# Patient Record
Sex: MALE | Race: WHITE | NOT HISPANIC OR LATINO | Employment: OTHER | URBAN - METROPOLITAN AREA
[De-identification: names, ages, dates, MRNs, and addresses within clinical notes are randomized per-mention and may not be internally consistent; named-entity substitution may affect disease eponyms.]

---

## 2017-01-31 ENCOUNTER — AMBULATORY - HEALTHEAST (OUTPATIENT)
Dept: MULTI SPECIALTY CLINIC | Facility: CLINIC | Age: 62
End: 2017-01-31

## 2017-08-02 ENCOUNTER — RECORDS - HEALTHEAST (OUTPATIENT)
Dept: ADMINISTRATIVE | Facility: OTHER | Age: 62
End: 2017-08-02

## 2017-08-07 ENCOUNTER — ANESTHESIA - HEALTHEAST (OUTPATIENT)
Dept: SURGERY | Facility: CLINIC | Age: 62
End: 2017-08-07

## 2017-08-07 ENCOUNTER — SURGERY - HEALTHEAST (OUTPATIENT)
Dept: SURGERY | Facility: CLINIC | Age: 62
End: 2017-08-07

## 2017-08-07 ASSESSMENT — MIFFLIN-ST. JEOR: SCORE: 1966.17

## 2017-10-10 ENCOUNTER — HOSPITAL ENCOUNTER (OUTPATIENT)
Dept: ULTRASOUND IMAGING | Facility: HOSPITAL | Age: 62
Discharge: HOME | End: 2017-10-10
Attending: PHYSICIAN ASSISTANT

## 2017-10-10 ENCOUNTER — RECORDS - HEALTHEAST (OUTPATIENT)
Dept: ADMINISTRATIVE | Facility: OTHER | Age: 62
End: 2017-10-10

## 2017-10-10 DIAGNOSIS — M79.89 PAIN AND SWELLING OF LEFT LOWER EXTREMITY: ICD-10-CM

## 2017-10-10 DIAGNOSIS — M79.605 PAIN AND SWELLING OF LEFT LOWER EXTREMITY: ICD-10-CM

## 2017-12-04 ENCOUNTER — ANESTHESIA - HEALTHEAST (OUTPATIENT)
Dept: SURGERY | Facility: CLINIC | Age: 62
End: 2017-12-04

## 2017-12-05 ENCOUNTER — SURGERY - HEALTHEAST (OUTPATIENT)
Dept: SURGERY | Facility: CLINIC | Age: 62
End: 2017-12-05

## 2017-12-05 ASSESSMENT — MIFFLIN-ST. JEOR: SCORE: 1908.34

## 2019-06-25 ENCOUNTER — OFFICE VISIT - HEALTHEAST (OUTPATIENT)
Dept: INTERNAL MEDICINE | Facility: CLINIC | Age: 64
End: 2019-06-25

## 2019-06-25 DIAGNOSIS — E11.9 TYPE 2 DIABETES MELLITUS WITHOUT COMPLICATION, WITHOUT LONG-TERM CURRENT USE OF INSULIN (H): ICD-10-CM

## 2019-06-25 DIAGNOSIS — I48.20 CHRONIC ATRIAL FIBRILLATION (H): ICD-10-CM

## 2019-06-25 DIAGNOSIS — I25.10 CORONARY ARTERY DISEASE DUE TO LIPID RICH PLAQUE: ICD-10-CM

## 2019-06-25 DIAGNOSIS — I25.83 CORONARY ARTERY DISEASE DUE TO LIPID RICH PLAQUE: ICD-10-CM

## 2019-06-25 DIAGNOSIS — E78.2 MIXED HYPERLIPIDEMIA: ICD-10-CM

## 2019-06-25 DIAGNOSIS — I87.2 VENOUS STASIS DERMATITIS OF RIGHT LOWER EXTREMITY: ICD-10-CM

## 2019-06-25 DIAGNOSIS — I10 ESSENTIAL HYPERTENSION: ICD-10-CM

## 2019-06-25 DIAGNOSIS — I50.42 CHRONIC COMBINED SYSTOLIC AND DIASTOLIC CONGESTIVE HEART FAILURE (H): ICD-10-CM

## 2019-06-25 DIAGNOSIS — F33.42 RECURRENT MAJOR DEPRESSIVE DISORDER, IN FULL REMISSION (H): ICD-10-CM

## 2019-06-25 LAB
ALBUMIN SERPL-MCNC: 3.5 G/DL (ref 3.5–5)
ALP SERPL-CCNC: 95 U/L (ref 45–120)
ALT SERPL W P-5'-P-CCNC: 20 U/L (ref 0–45)
ANION GAP SERPL CALCULATED.3IONS-SCNC: 13 MMOL/L (ref 5–18)
AST SERPL W P-5'-P-CCNC: 17 U/L (ref 0–40)
BILIRUB DIRECT SERPL-MCNC: 0.3 MG/DL
BILIRUB SERPL-MCNC: 0.7 MG/DL (ref 0–1)
BNP SERPL-MCNC: 25 PG/ML (ref 0–59)
BUN SERPL-MCNC: 25 MG/DL (ref 8–22)
CALCIUM SERPL-MCNC: 9.4 MG/DL (ref 8.5–10.5)
CHLORIDE BLD-SCNC: 95 MMOL/L (ref 98–107)
CHOLEST SERPL-MCNC: 105 MG/DL
CO2 SERPL-SCNC: 24 MMOL/L (ref 22–31)
CREAT SERPL-MCNC: 1.51 MG/DL (ref 0.7–1.3)
DIGOXIN LEVEL LHE- HISTORICAL: 0.7 NG/ML (ref 0.5–2)
ERYTHROCYTE [DISTWIDTH] IN BLOOD BY AUTOMATED COUNT: 12.4 % (ref 11–14.5)
FASTING STATUS PATIENT QL REPORTED: YES
GFR SERPL CREATININE-BSD FRML MDRD: 47 ML/MIN/1.73M2
GLUCOSE BLD-MCNC: 203 MG/DL (ref 70–125)
HBA1C MFR BLD: 8.8 % (ref 3.5–6)
HCT VFR BLD AUTO: 41.6 % (ref 40–54)
HDLC SERPL-MCNC: 38 MG/DL
HGB BLD-MCNC: 14.4 G/DL (ref 14–18)
LDLC SERPL CALC-MCNC: 45 MG/DL
MCH RBC QN AUTO: 31.3 PG (ref 27–34)
MCHC RBC AUTO-ENTMCNC: 34.6 G/DL (ref 32–36)
MCV RBC AUTO: 91 FL (ref 80–100)
PLATELET # BLD AUTO: 244 THOU/UL (ref 140–440)
PMV BLD AUTO: 6.8 FL (ref 7–10)
POTASSIUM BLD-SCNC: 4.4 MMOL/L (ref 3.5–5)
PROT SERPL-MCNC: 6.7 G/DL (ref 6–8)
RBC # BLD AUTO: 4.59 MILL/UL (ref 4.4–6.2)
SODIUM SERPL-SCNC: 132 MMOL/L (ref 136–145)
TRIGL SERPL-MCNC: 108 MG/DL
TSH SERPL DL<=0.005 MIU/L-ACNC: 0.52 UIU/ML (ref 0.3–5)
WBC: 15.4 THOU/UL (ref 4–11)

## 2019-06-25 ASSESSMENT — MIFFLIN-ST. JEOR: SCORE: 2079

## 2019-06-26 ENCOUNTER — COMMUNICATION - HEALTHEAST (OUTPATIENT)
Dept: INTERNAL MEDICINE | Facility: CLINIC | Age: 64
End: 2019-06-26

## 2019-06-26 DIAGNOSIS — R94.4 ABNORMAL RENAL FUNCTION TEST: ICD-10-CM

## 2019-07-03 ENCOUNTER — OFFICE VISIT - HEALTHEAST (OUTPATIENT)
Dept: CARDIOLOGY | Facility: CLINIC | Age: 64
End: 2019-07-03

## 2019-07-03 ENCOUNTER — COMMUNICATION - HEALTHEAST (OUTPATIENT)
Dept: INTERNAL MEDICINE | Facility: CLINIC | Age: 64
End: 2019-07-03

## 2019-07-03 DIAGNOSIS — I48.20 CHRONIC ATRIAL FIBRILLATION (H): ICD-10-CM

## 2019-07-03 LAB — D DIMER PPP FEU-MCNC: <0.27 FEU UG/ML

## 2019-07-03 ASSESSMENT — MIFFLIN-ST. JEOR: SCORE: 2083.54

## 2019-07-09 ENCOUNTER — OFFICE VISIT - HEALTHEAST (OUTPATIENT)
Dept: INTERNAL MEDICINE | Facility: CLINIC | Age: 64
End: 2019-07-09

## 2019-07-09 DIAGNOSIS — E11.9 TYPE 2 DIABETES MELLITUS WITHOUT COMPLICATION, WITHOUT LONG-TERM CURRENT USE OF INSULIN (H): ICD-10-CM

## 2019-07-09 DIAGNOSIS — E78.2 MIXED HYPERLIPIDEMIA: ICD-10-CM

## 2019-07-09 DIAGNOSIS — I10 ESSENTIAL HYPERTENSION: ICD-10-CM

## 2019-07-09 DIAGNOSIS — I50.42 CHRONIC COMBINED SYSTOLIC AND DIASTOLIC CONGESTIVE HEART FAILURE (H): ICD-10-CM

## 2019-07-09 DIAGNOSIS — N18.30 CKD (CHRONIC KIDNEY DISEASE) STAGE 3, GFR 30-59 ML/MIN (H): ICD-10-CM

## 2019-07-09 DIAGNOSIS — I89.0 LYMPHEDEMA OF BOTH LOWER EXTREMITIES: ICD-10-CM

## 2019-07-09 DIAGNOSIS — I87.2 VENOUS STASIS DERMATITIS OF RIGHT LOWER EXTREMITY: ICD-10-CM

## 2019-07-09 DIAGNOSIS — I48.20 CHRONIC ATRIAL FIBRILLATION (H): ICD-10-CM

## 2019-07-09 ASSESSMENT — MIFFLIN-ST. JEOR: SCORE: 2079

## 2019-07-12 ENCOUNTER — COMMUNICATION - HEALTHEAST (OUTPATIENT)
Dept: INTERNAL MEDICINE | Facility: CLINIC | Age: 64
End: 2019-07-12

## 2019-07-12 DIAGNOSIS — R60.9 EDEMA, UNSPECIFIED TYPE: ICD-10-CM

## 2019-07-15 ENCOUNTER — COMMUNICATION - HEALTHEAST (OUTPATIENT)
Dept: INTERNAL MEDICINE | Facility: CLINIC | Age: 64
End: 2019-07-15

## 2019-07-15 DIAGNOSIS — E11.9 TYPE 2 DIABETES MELLITUS WITHOUT COMPLICATION, WITHOUT LONG-TERM CURRENT USE OF INSULIN (H): ICD-10-CM

## 2019-07-26 ENCOUNTER — RECORDS - HEALTHEAST (OUTPATIENT)
Dept: ADMINISTRATIVE | Facility: OTHER | Age: 64
End: 2019-07-26

## 2019-08-06 ENCOUNTER — OFFICE VISIT - HEALTHEAST (OUTPATIENT)
Dept: INTERNAL MEDICINE | Facility: CLINIC | Age: 64
End: 2019-08-06

## 2019-08-06 DIAGNOSIS — E11.9 TYPE 2 DIABETES MELLITUS WITHOUT COMPLICATION, WITHOUT LONG-TERM CURRENT USE OF INSULIN (H): ICD-10-CM

## 2019-08-06 DIAGNOSIS — I25.10 CORONARY ARTERY DISEASE DUE TO LIPID RICH PLAQUE: ICD-10-CM

## 2019-08-06 DIAGNOSIS — I25.83 CORONARY ARTERY DISEASE DUE TO LIPID RICH PLAQUE: ICD-10-CM

## 2019-08-06 DIAGNOSIS — I87.2 VENOUS STASIS DERMATITIS OF RIGHT LOWER EXTREMITY: ICD-10-CM

## 2019-08-06 DIAGNOSIS — I50.42 CHRONIC COMBINED SYSTOLIC AND DIASTOLIC CONGESTIVE HEART FAILURE (H): ICD-10-CM

## 2019-08-06 DIAGNOSIS — E78.2 MIXED HYPERLIPIDEMIA: ICD-10-CM

## 2019-08-06 DIAGNOSIS — I10 ESSENTIAL HYPERTENSION: ICD-10-CM

## 2019-08-06 DIAGNOSIS — I48.20 CHRONIC ATRIAL FIBRILLATION (H): ICD-10-CM

## 2019-08-06 DIAGNOSIS — D72.829 LEUKOCYTOSIS, UNSPECIFIED TYPE: ICD-10-CM

## 2019-08-06 DIAGNOSIS — G89.29 CHRONIC PAIN OF LEFT KNEE: ICD-10-CM

## 2019-08-06 DIAGNOSIS — M25.562 CHRONIC PAIN OF LEFT KNEE: ICD-10-CM

## 2019-08-06 LAB
BASOPHILS # BLD AUTO: 0.1 THOU/UL (ref 0–0.2)
BASOPHILS NFR BLD AUTO: 1 % (ref 0–2)
EOSINOPHIL # BLD AUTO: 0.2 THOU/UL (ref 0–0.4)
EOSINOPHIL NFR BLD AUTO: 2 % (ref 0–6)
ERYTHROCYTE [DISTWIDTH] IN BLOOD BY AUTOMATED COUNT: 12.6 % (ref 11–14.5)
HCT VFR BLD AUTO: 38.6 % (ref 40–54)
HGB BLD-MCNC: 13.1 G/DL (ref 14–18)
LYMPHOCYTES # BLD AUTO: 3.1 THOU/UL (ref 0.8–4.4)
LYMPHOCYTES NFR BLD AUTO: 34 % (ref 20–40)
MCH RBC QN AUTO: 31.1 PG (ref 27–34)
MCHC RBC AUTO-ENTMCNC: 34 G/DL (ref 32–36)
MCV RBC AUTO: 92 FL (ref 80–100)
MONOCYTES # BLD AUTO: 0.9 THOU/UL (ref 0–0.9)
MONOCYTES NFR BLD AUTO: 9 % (ref 2–10)
NEUTROPHILS # BLD AUTO: 5 THOU/UL (ref 2–7.7)
NEUTROPHILS NFR BLD AUTO: 54 % (ref 50–70)
PLATELET # BLD AUTO: 236 THOU/UL (ref 140–440)
PMV BLD AUTO: 6.8 FL (ref 7–10)
RBC # BLD AUTO: 4.21 MILL/UL (ref 4.4–6.2)
WBC: 9.2 THOU/UL (ref 4–11)

## 2019-08-06 ASSESSMENT — MIFFLIN-ST. JEOR: SCORE: 2088.08

## 2019-08-07 ENCOUNTER — COMMUNICATION - HEALTHEAST (OUTPATIENT)
Dept: INTERNAL MEDICINE | Facility: CLINIC | Age: 64
End: 2019-08-07

## 2019-08-07 LAB
BASOPHILS # BLD AUTO: 0 THOU/UL (ref 0–0.2)
BASOPHILS NFR BLD AUTO: 0 % (ref 0–2)
EOSINOPHIL # BLD AUTO: 0.1 THOU/UL (ref 0–0.4)
EOSINOPHIL NFR BLD AUTO: 2 % (ref 0–6)
ERYTHROCYTE [DISTWIDTH] IN BLOOD BY AUTOMATED COUNT: 13.2 % (ref 11–14.5)
HCT VFR BLD AUTO: 39.4 % (ref 40–54)
HGB BLD-MCNC: 13 G/DL (ref 14–18)
LYMPHOCYTES # BLD AUTO: 2.9 THOU/UL (ref 0.8–4.4)
LYMPHOCYTES NFR BLD AUTO: 33 % (ref 20–40)
MCH RBC QN AUTO: 30.8 PG (ref 27–34)
MCHC RBC AUTO-ENTMCNC: 33 G/DL (ref 32–36)
MCV RBC AUTO: 93 FL (ref 80–100)
MONOCYTES # BLD AUTO: 0.8 THOU/UL (ref 0–0.9)
MONOCYTES NFR BLD AUTO: 9 % (ref 2–10)
NEUTROPHILS # BLD AUTO: 5 THOU/UL (ref 2–7.7)
NEUTROPHILS NFR BLD AUTO: 56 % (ref 50–70)
PATH REPORT.MICROSCOPIC SPEC OTHER STN: ABNORMAL
PLAT MORPH BLD: NORMAL
PLATELET # BLD AUTO: 225 THOU/UL (ref 140–440)
PMV BLD AUTO: 9.4 FL (ref 8.5–12.5)
RBC # BLD AUTO: 4.22 MILL/UL (ref 4.4–6.2)
REACTIVE LYMPHS: ABNORMAL
WBC: 8.9 THOU/UL (ref 4–11)

## 2019-08-08 LAB
LAB AP CHARGES (HE HISTORICAL CONVERSION): NORMAL
PATH REPORT.COMMENTS IMP SPEC: NORMAL
PATH REPORT.COMMENTS IMP SPEC: NORMAL
PATH REPORT.FINAL DX SPEC: NORMAL
PATH REPORT.RELEVANT HX SPEC: NORMAL

## 2019-08-09 ENCOUNTER — COMMUNICATION - HEALTHEAST (OUTPATIENT)
Dept: INTERNAL MEDICINE | Facility: CLINIC | Age: 64
End: 2019-08-09

## 2019-08-09 DIAGNOSIS — G89.29 CHRONIC PAIN OF LEFT KNEE: ICD-10-CM

## 2019-08-09 DIAGNOSIS — M25.562 CHRONIC PAIN OF LEFT KNEE: ICD-10-CM

## 2019-08-09 DIAGNOSIS — M62.838 MUSCLE SPASM: ICD-10-CM

## 2019-08-12 ENCOUNTER — OFFICE VISIT - HEALTHEAST (OUTPATIENT)
Dept: VASCULAR SURGERY | Facility: CLINIC | Age: 64
End: 2019-08-12

## 2019-08-12 DIAGNOSIS — I87.2 VENOUS INSUFFICIENCY (CHRONIC) (PERIPHERAL): ICD-10-CM

## 2019-08-12 DIAGNOSIS — I89.0 LYMPHEDEMA OF BOTH LOWER EXTREMITIES: ICD-10-CM

## 2019-08-12 DIAGNOSIS — I73.9 PAD (PERIPHERAL ARTERY DISEASE) (H): ICD-10-CM

## 2019-08-14 ENCOUNTER — OFFICE VISIT - HEALTHEAST (OUTPATIENT)
Dept: PHARMACY | Facility: CLINIC | Age: 64
End: 2019-08-14

## 2019-08-14 ENCOUNTER — COMMUNICATION - HEALTHEAST (OUTPATIENT)
Dept: PHARMACY | Facility: CLINIC | Age: 64
End: 2019-08-14

## 2019-08-14 ENCOUNTER — COMMUNICATION - HEALTHEAST (OUTPATIENT)
Dept: ADMINISTRATIVE | Facility: HOSPITAL | Age: 64
End: 2019-08-14

## 2019-08-14 DIAGNOSIS — M25.562 CHRONIC PAIN OF LEFT KNEE: ICD-10-CM

## 2019-08-14 DIAGNOSIS — E11.9 TYPE 2 DIABETES MELLITUS WITHOUT COMPLICATION, WITHOUT LONG-TERM CURRENT USE OF INSULIN (H): ICD-10-CM

## 2019-08-14 DIAGNOSIS — M62.838 MUSCLE SPASM: ICD-10-CM

## 2019-08-14 DIAGNOSIS — G89.29 CHRONIC PAIN OF LEFT KNEE: ICD-10-CM

## 2019-08-15 ENCOUNTER — COMMUNICATION - HEALTHEAST (OUTPATIENT)
Dept: ONCOLOGY | Facility: HOSPITAL | Age: 64
End: 2019-08-15

## 2019-08-15 ENCOUNTER — COMMUNICATION - HEALTHEAST (OUTPATIENT)
Dept: ADMINISTRATIVE | Facility: HOSPITAL | Age: 64
End: 2019-08-15

## 2019-08-28 ENCOUNTER — OFFICE VISIT - HEALTHEAST (OUTPATIENT)
Dept: INTERNAL MEDICINE | Facility: CLINIC | Age: 64
End: 2019-08-28

## 2019-08-28 DIAGNOSIS — M25.551 HIP PAIN, RIGHT: ICD-10-CM

## 2019-08-28 DIAGNOSIS — M25.561 CHRONIC PAIN OF RIGHT KNEE: ICD-10-CM

## 2019-08-28 DIAGNOSIS — I48.20 CHRONIC ATRIAL FIBRILLATION (H): ICD-10-CM

## 2019-08-28 DIAGNOSIS — G89.29 CHRONIC PAIN OF RIGHT KNEE: ICD-10-CM

## 2019-08-28 ASSESSMENT — MIFFLIN-ST. JEOR: SCORE: 2038.18

## 2019-09-04 ENCOUNTER — COMMUNICATION - HEALTHEAST (OUTPATIENT)
Dept: INTERNAL MEDICINE | Facility: CLINIC | Age: 64
End: 2019-09-04

## 2019-09-04 ENCOUNTER — OFFICE VISIT - HEALTHEAST (OUTPATIENT)
Dept: PHARMACY | Facility: CLINIC | Age: 64
End: 2019-09-04

## 2019-09-04 DIAGNOSIS — E11.9 TYPE 2 DIABETES MELLITUS WITHOUT COMPLICATION, WITHOUT LONG-TERM CURRENT USE OF INSULIN (H): ICD-10-CM

## 2019-09-05 ENCOUNTER — RECORDS - HEALTHEAST (OUTPATIENT)
Dept: ADMINISTRATIVE | Facility: OTHER | Age: 64
End: 2019-09-05

## 2019-09-09 ENCOUNTER — OFFICE VISIT - HEALTHEAST (OUTPATIENT)
Dept: ONCOLOGY | Facility: HOSPITAL | Age: 64
End: 2019-09-09

## 2019-09-09 ENCOUNTER — OFFICE VISIT - HEALTHEAST (OUTPATIENT)
Dept: EDUCATION SERVICES | Facility: CLINIC | Age: 64
End: 2019-09-09

## 2019-09-09 DIAGNOSIS — E11.9 TYPE 2 DIABETES MELLITUS WITHOUT COMPLICATION, WITHOUT LONG-TERM CURRENT USE OF INSULIN (H): ICD-10-CM

## 2019-09-09 DIAGNOSIS — D72.829 LEUKOCYTOSIS, UNSPECIFIED TYPE: ICD-10-CM

## 2019-09-09 ASSESSMENT — MIFFLIN-ST. JEOR: SCORE: 2045.89

## 2019-09-19 ENCOUNTER — COMMUNICATION - HEALTHEAST (OUTPATIENT)
Dept: INTERNAL MEDICINE | Facility: CLINIC | Age: 64
End: 2019-09-19

## 2019-09-19 DIAGNOSIS — R60.9 EDEMA, UNSPECIFIED TYPE: ICD-10-CM

## 2019-09-27 ENCOUNTER — COMMUNICATION - HEALTHEAST (OUTPATIENT)
Dept: ADMINISTRATIVE | Facility: CLINIC | Age: 64
End: 2019-09-27

## 2019-09-27 ENCOUNTER — COMMUNICATION - HEALTHEAST (OUTPATIENT)
Dept: INTERNAL MEDICINE | Facility: CLINIC | Age: 64
End: 2019-09-27

## 2019-09-27 ENCOUNTER — OFFICE VISIT - HEALTHEAST (OUTPATIENT)
Dept: EDUCATION SERVICES | Facility: CLINIC | Age: 64
End: 2019-09-27

## 2019-09-27 DIAGNOSIS — G89.29 CHRONIC PAIN OF RIGHT KNEE: ICD-10-CM

## 2019-09-27 DIAGNOSIS — M25.551 HIP PAIN, RIGHT: ICD-10-CM

## 2019-09-27 DIAGNOSIS — E11.9 TYPE 2 DIABETES MELLITUS WITHOUT COMPLICATION, WITHOUT LONG-TERM CURRENT USE OF INSULIN (H): ICD-10-CM

## 2019-09-27 DIAGNOSIS — M25.561 CHRONIC PAIN OF RIGHT KNEE: ICD-10-CM

## 2019-10-01 ENCOUNTER — COMMUNICATION - HEALTHEAST (OUTPATIENT)
Dept: EDUCATION SERVICES | Facility: CLINIC | Age: 64
End: 2019-10-01

## 2019-10-01 DIAGNOSIS — E11.9 TYPE 2 DIABETES MELLITUS WITHOUT COMPLICATION, WITHOUT LONG-TERM CURRENT USE OF INSULIN (H): ICD-10-CM

## 2019-10-02 ENCOUNTER — RECORDS - HEALTHEAST (OUTPATIENT)
Dept: VASCULAR ULTRASOUND | Facility: CLINIC | Age: 64
End: 2019-10-02

## 2019-10-02 DIAGNOSIS — I73.9 PERIPHERAL VASCULAR DISEASE, UNSPECIFIED (H): ICD-10-CM

## 2019-10-02 DIAGNOSIS — I87.2 VENOUS INSUFFICIENCY (CHRONIC) (PERIPHERAL): ICD-10-CM

## 2019-10-02 DIAGNOSIS — I89.0 LYMPHEDEMA, NOT ELSEWHERE CLASSIFIED: ICD-10-CM

## 2019-10-03 ENCOUNTER — HEALTH MAINTENANCE LETTER (OUTPATIENT)
Age: 64
End: 2019-10-03

## 2019-10-04 ENCOUNTER — COMMUNICATION - HEALTHEAST (OUTPATIENT)
Dept: INTERNAL MEDICINE | Facility: CLINIC | Age: 64
End: 2019-10-04

## 2019-10-04 DIAGNOSIS — M62.838 MUSCLE SPASM: ICD-10-CM

## 2019-10-14 ENCOUNTER — COMMUNICATION - HEALTHEAST (OUTPATIENT)
Dept: VASCULAR SURGERY | Facility: CLINIC | Age: 64
End: 2019-10-14

## 2019-10-29 ENCOUNTER — COMMUNICATION - HEALTHEAST (OUTPATIENT)
Dept: INTERNAL MEDICINE | Facility: CLINIC | Age: 64
End: 2019-10-29

## 2019-10-29 DIAGNOSIS — M62.838 MUSCLE SPASM: ICD-10-CM

## 2019-11-12 ENCOUNTER — COMMUNICATION - HEALTHEAST (OUTPATIENT)
Dept: INTERNAL MEDICINE | Facility: CLINIC | Age: 64
End: 2019-11-12

## 2019-11-15 ENCOUNTER — OFFICE VISIT - HEALTHEAST (OUTPATIENT)
Dept: EDUCATION SERVICES | Facility: CLINIC | Age: 64
End: 2019-11-15

## 2019-11-15 DIAGNOSIS — E11.9 TYPE 2 DIABETES MELLITUS WITHOUT COMPLICATION, WITHOUT LONG-TERM CURRENT USE OF INSULIN (H): ICD-10-CM

## 2019-11-19 ENCOUNTER — COMMUNICATION - HEALTHEAST (OUTPATIENT)
Dept: INTERNAL MEDICINE | Facility: CLINIC | Age: 64
End: 2019-11-19

## 2019-11-19 DIAGNOSIS — M62.838 MUSCLE SPASM: ICD-10-CM

## 2019-11-20 ENCOUNTER — COMMUNICATION - HEALTHEAST (OUTPATIENT)
Dept: CARDIOLOGY | Facility: CLINIC | Age: 64
End: 2019-11-20

## 2019-11-21 ENCOUNTER — COMMUNICATION - HEALTHEAST (OUTPATIENT)
Dept: EDUCATION SERVICES | Facility: CLINIC | Age: 64
End: 2019-11-21

## 2019-11-21 DIAGNOSIS — E11.9 TYPE 2 DIABETES MELLITUS WITHOUT COMPLICATION, WITHOUT LONG-TERM CURRENT USE OF INSULIN (H): ICD-10-CM

## 2019-12-13 ENCOUNTER — OFFICE VISIT - HEALTHEAST (OUTPATIENT)
Dept: EDUCATION SERVICES | Facility: CLINIC | Age: 64
End: 2019-12-13

## 2019-12-13 DIAGNOSIS — E11.9 TYPE 2 DIABETES MELLITUS WITHOUT COMPLICATION, WITHOUT LONG-TERM CURRENT USE OF INSULIN (H): ICD-10-CM

## 2019-12-24 ENCOUNTER — COMMUNICATION - HEALTHEAST (OUTPATIENT)
Dept: ANTICOAGULATION | Facility: CLINIC | Age: 64
End: 2019-12-24

## 2019-12-24 ENCOUNTER — COMMUNICATION - HEALTHEAST (OUTPATIENT)
Dept: INTERNAL MEDICINE | Facility: CLINIC | Age: 64
End: 2019-12-24

## 2019-12-24 ENCOUNTER — OFFICE VISIT - HEALTHEAST (OUTPATIENT)
Dept: INTERNAL MEDICINE | Facility: CLINIC | Age: 64
End: 2019-12-24

## 2019-12-24 DIAGNOSIS — I50.42 CHRONIC COMBINED SYSTOLIC AND DIASTOLIC CONGESTIVE HEART FAILURE (H): ICD-10-CM

## 2019-12-24 DIAGNOSIS — Z79.4 TYPE 2 DIABETES MELLITUS WITHOUT COMPLICATION, WITH LONG-TERM CURRENT USE OF INSULIN (H): ICD-10-CM

## 2019-12-24 DIAGNOSIS — E11.9 TYPE 2 DIABETES MELLITUS WITHOUT COMPLICATION, WITH LONG-TERM CURRENT USE OF INSULIN (H): ICD-10-CM

## 2019-12-24 DIAGNOSIS — I48.20 CHRONIC ATRIAL FIBRILLATION (H): ICD-10-CM

## 2019-12-24 DIAGNOSIS — I25.83 CORONARY ARTERY DISEASE DUE TO LIPID RICH PLAQUE: ICD-10-CM

## 2019-12-24 DIAGNOSIS — Z23 NEED FOR IMMUNIZATION AGAINST INFLUENZA: ICD-10-CM

## 2019-12-24 DIAGNOSIS — R53.82 CHRONIC FATIGUE: ICD-10-CM

## 2019-12-24 DIAGNOSIS — I25.10 CORONARY ARTERY DISEASE DUE TO LIPID RICH PLAQUE: ICD-10-CM

## 2019-12-24 DIAGNOSIS — I48.91 A-FIB (H): ICD-10-CM

## 2019-12-24 DIAGNOSIS — M62.838 MUSCLE SPASM: ICD-10-CM

## 2019-12-24 DIAGNOSIS — I10 ESSENTIAL HYPERTENSION: ICD-10-CM

## 2019-12-24 DIAGNOSIS — E78.2 MIXED HYPERLIPIDEMIA: ICD-10-CM

## 2019-12-24 DIAGNOSIS — Z79.01 LONG TERM (CURRENT) USE OF ANTICOAGULANTS: ICD-10-CM

## 2019-12-24 DIAGNOSIS — F33.42 RECURRENT MAJOR DEPRESSIVE DISORDER, IN FULL REMISSION (H): ICD-10-CM

## 2019-12-24 LAB
ALBUMIN SERPL-MCNC: 3.6 G/DL (ref 3.5–5)
ALP SERPL-CCNC: 111 U/L (ref 45–120)
ALT SERPL W P-5'-P-CCNC: 19 U/L (ref 0–45)
ANION GAP SERPL CALCULATED.3IONS-SCNC: 14 MMOL/L (ref 5–18)
AST SERPL W P-5'-P-CCNC: 13 U/L (ref 0–40)
BILIRUB DIRECT SERPL-MCNC: 0.2 MG/DL
BILIRUB SERPL-MCNC: 0.4 MG/DL (ref 0–1)
BUN SERPL-MCNC: 11 MG/DL (ref 8–22)
CALCIUM SERPL-MCNC: 9.7 MG/DL (ref 8.5–10.5)
CHLORIDE BLD-SCNC: 104 MMOL/L (ref 98–107)
CHOLEST SERPL-MCNC: 108 MG/DL
CO2 SERPL-SCNC: 20 MMOL/L (ref 22–31)
CREAT SERPL-MCNC: 0.83 MG/DL (ref 0.7–1.3)
ERYTHROCYTE [DISTWIDTH] IN BLOOD BY AUTOMATED COUNT: 13.2 % (ref 11–14.5)
FASTING STATUS PATIENT QL REPORTED: ABNORMAL
GFR SERPL CREATININE-BSD FRML MDRD: >60 ML/MIN/1.73M2
GLUCOSE BLD-MCNC: 150 MG/DL (ref 70–125)
HBA1C MFR BLD: 9.6 % (ref 3.5–6)
HCT VFR BLD AUTO: 43.2 % (ref 40–54)
HDLC SERPL-MCNC: 33 MG/DL
HGB BLD-MCNC: 14.1 G/DL (ref 14–18)
INR PPP: 1.01 (ref 0.9–1.1)
LDLC SERPL CALC-MCNC: 51 MG/DL
MCH RBC QN AUTO: 29.6 PG (ref 27–34)
MCHC RBC AUTO-ENTMCNC: 32.6 G/DL (ref 32–36)
MCV RBC AUTO: 91 FL (ref 80–100)
PLATELET # BLD AUTO: 259 THOU/UL (ref 140–440)
PMV BLD AUTO: 9 FL (ref 8.5–12.5)
POTASSIUM BLD-SCNC: 3.5 MMOL/L (ref 3.5–5)
PROT SERPL-MCNC: 7.2 G/DL (ref 6–8)
RBC # BLD AUTO: 4.76 MILL/UL (ref 4.4–6.2)
SODIUM SERPL-SCNC: 138 MMOL/L (ref 136–145)
TRIGL SERPL-MCNC: 120 MG/DL
TSH SERPL DL<=0.005 MIU/L-ACNC: 1.93 UIU/ML (ref 0.3–5)
WBC: 13.2 THOU/UL (ref 4–11)

## 2019-12-24 ASSESSMENT — MIFFLIN-ST. JEOR: SCORE: 2083.54

## 2019-12-24 ASSESSMENT — PATIENT HEALTH QUESTIONNAIRE - PHQ9: SUM OF ALL RESPONSES TO PHQ QUESTIONS 1-9: 9

## 2019-12-27 LAB
SHBG SERPL-SCNC: 44 NMOL/L (ref 11–80)
TESTOST FREE SERPL-MCNC: 3.54 NG/DL (ref 4.7–24.4)
TESTOST SERPL-MCNC: 222 NG/DL (ref 240–950)

## 2019-12-30 ENCOUNTER — COMMUNICATION - HEALTHEAST (OUTPATIENT)
Dept: INTERNAL MEDICINE | Facility: CLINIC | Age: 64
End: 2019-12-30

## 2019-12-30 DIAGNOSIS — E11.9 TYPE 2 DIABETES MELLITUS WITHOUT COMPLICATION, WITH LONG-TERM CURRENT USE OF INSULIN (H): ICD-10-CM

## 2019-12-30 DIAGNOSIS — E29.1 HYPOGONADISM MALE: ICD-10-CM

## 2019-12-30 DIAGNOSIS — Z79.4 TYPE 2 DIABETES MELLITUS WITHOUT COMPLICATION, WITH LONG-TERM CURRENT USE OF INSULIN (H): ICD-10-CM

## 2019-12-31 ENCOUNTER — COMMUNICATION - HEALTHEAST (OUTPATIENT)
Dept: INTERNAL MEDICINE | Facility: CLINIC | Age: 64
End: 2019-12-31

## 2020-01-03 ENCOUNTER — COMMUNICATION - HEALTHEAST (OUTPATIENT)
Dept: ANTICOAGULATION | Facility: CLINIC | Age: 65
End: 2020-01-03

## 2020-01-06 ENCOUNTER — COMMUNICATION - HEALTHEAST (OUTPATIENT)
Dept: ANTICOAGULATION | Facility: CLINIC | Age: 65
End: 2020-01-06

## 2020-01-06 ENCOUNTER — HOSPITAL ENCOUNTER (OUTPATIENT)
Dept: PHARMACY | Facility: OTHER | Age: 65
Discharge: HOME | End: 2020-01-06
Attending: PHARMACIST

## 2020-01-06 ENCOUNTER — AMBULATORY - HEALTHEAST (OUTPATIENT)
Dept: LAB | Facility: CLINIC | Age: 65
End: 2020-01-06

## 2020-01-06 DIAGNOSIS — I50.42 CHRONIC COMBINED SYSTOLIC AND DIASTOLIC CONGESTIVE HEART FAILURE (H): ICD-10-CM

## 2020-01-06 DIAGNOSIS — E11.9 TYPE 2 DIABETES MELLITUS WITHOUT COMPLICATION, WITHOUT LONG-TERM CURRENT USE OF INSULIN (H): ICD-10-CM

## 2020-01-06 DIAGNOSIS — I25.83 CORONARY ARTERY DISEASE DUE TO LIPID RICH PLAQUE: ICD-10-CM

## 2020-01-06 DIAGNOSIS — F33.42 RECURRENT MAJOR DEPRESSIVE DISORDER, IN FULL REMISSION (H): ICD-10-CM

## 2020-01-06 DIAGNOSIS — I48.20 CHRONIC ATRIAL FIBRILLATION (H): ICD-10-CM

## 2020-01-06 DIAGNOSIS — I25.10 CORONARY ARTERY DISEASE DUE TO LIPID RICH PLAQUE: ICD-10-CM

## 2020-01-06 DIAGNOSIS — I48.91 A-FIB (H): ICD-10-CM

## 2020-01-06 DIAGNOSIS — R79.89 LOW TESTOSTERONE IN MALE: ICD-10-CM

## 2020-01-06 LAB — INR PPP: 2.3 (ref 0.9–1.1)

## 2020-01-09 ENCOUNTER — COMMUNICATION - HEALTHEAST (OUTPATIENT)
Dept: INTERNAL MEDICINE | Facility: CLINIC | Age: 65
End: 2020-01-09

## 2020-01-09 DIAGNOSIS — E78.2 MIXED HYPERLIPIDEMIA: ICD-10-CM

## 2020-01-15 ENCOUNTER — COMMUNICATION - HEALTHEAST (OUTPATIENT)
Dept: ANTICOAGULATION | Facility: CLINIC | Age: 65
End: 2020-01-15

## 2020-01-15 ENCOUNTER — AMBULATORY - HEALTHEAST (OUTPATIENT)
Dept: LAB | Facility: CLINIC | Age: 65
End: 2020-01-15

## 2020-01-15 DIAGNOSIS — I48.91 A-FIB (H): ICD-10-CM

## 2020-01-15 DIAGNOSIS — I48.20 CHRONIC ATRIAL FIBRILLATION (H): ICD-10-CM

## 2020-01-15 LAB — INR PPP: 2.2 (ref 0.9–1.1)

## 2020-01-21 ENCOUNTER — COMMUNICATION - HEALTHEAST (OUTPATIENT)
Dept: INTERNAL MEDICINE | Facility: CLINIC | Age: 65
End: 2020-01-21

## 2020-01-21 DIAGNOSIS — I10 ESSENTIAL HYPERTENSION: ICD-10-CM

## 2020-01-27 ENCOUNTER — HOSPITAL ENCOUNTER (OUTPATIENT)
Dept: PHARMACY | Facility: OTHER | Age: 65
Discharge: HOME | End: 2020-01-27
Attending: PHARMACIST

## 2020-01-27 DIAGNOSIS — E11.9 TYPE 2 DIABETES MELLITUS WITHOUT COMPLICATION, WITHOUT LONG-TERM CURRENT USE OF INSULIN (H): ICD-10-CM

## 2020-01-27 DIAGNOSIS — I50.42 CHRONIC COMBINED SYSTOLIC AND DIASTOLIC CONGESTIVE HEART FAILURE (H): ICD-10-CM

## 2020-01-27 DIAGNOSIS — R79.89 LOW TESTOSTERONE IN MALE: ICD-10-CM

## 2020-02-01 ENCOUNTER — COMMUNICATION - HEALTHEAST (OUTPATIENT)
Dept: INTERNAL MEDICINE | Facility: CLINIC | Age: 65
End: 2020-02-01

## 2020-02-01 DIAGNOSIS — E11.9 TYPE 2 DIABETES MELLITUS WITHOUT COMPLICATION, WITHOUT LONG-TERM CURRENT USE OF INSULIN (H): ICD-10-CM

## 2020-02-06 LAB — INR PPP: 2.1 (ref 0.9–1.1)

## 2020-02-07 ENCOUNTER — COMMUNICATION - HEALTHEAST (OUTPATIENT)
Dept: INTERNAL MEDICINE | Facility: CLINIC | Age: 65
End: 2020-02-07

## 2020-02-08 ENCOUNTER — HEALTH MAINTENANCE LETTER (OUTPATIENT)
Age: 65
End: 2020-02-08

## 2020-02-11 ENCOUNTER — COMMUNICATION - HEALTHEAST (OUTPATIENT)
Dept: INTERNAL MEDICINE | Facility: CLINIC | Age: 65
End: 2020-02-11

## 2020-02-11 DIAGNOSIS — M62.838 MUSCLE SPASM: ICD-10-CM

## 2020-02-14 ENCOUNTER — AMBULATORY - HEALTHEAST (OUTPATIENT)
Dept: PHARMACY | Facility: CLINIC | Age: 65
End: 2020-02-14

## 2020-02-14 DIAGNOSIS — I48.20 CHRONIC ATRIAL FIBRILLATION (H): ICD-10-CM

## 2020-02-14 DIAGNOSIS — I25.83 CORONARY ARTERY DISEASE DUE TO LIPID RICH PLAQUE: ICD-10-CM

## 2020-02-14 DIAGNOSIS — E11.9 TYPE 2 DIABETES MELLITUS WITHOUT COMPLICATION, WITHOUT LONG-TERM CURRENT USE OF INSULIN (H): ICD-10-CM

## 2020-02-14 DIAGNOSIS — R79.89 LOW TESTOSTERONE IN MALE: ICD-10-CM

## 2020-02-14 DIAGNOSIS — I50.42 CHRONIC COMBINED SYSTOLIC AND DIASTOLIC CONGESTIVE HEART FAILURE (H): ICD-10-CM

## 2020-02-14 DIAGNOSIS — I25.10 CORONARY ARTERY DISEASE DUE TO LIPID RICH PLAQUE: ICD-10-CM

## 2020-02-18 ENCOUNTER — COMMUNICATION - HEALTHEAST (OUTPATIENT)
Dept: ANTICOAGULATION | Facility: CLINIC | Age: 65
End: 2020-02-18

## 2020-02-18 DIAGNOSIS — I48.20 CHRONIC ATRIAL FIBRILLATION (H): ICD-10-CM

## 2020-03-09 ENCOUNTER — COMMUNICATION - HEALTHEAST (OUTPATIENT)
Dept: INTERNAL MEDICINE | Facility: CLINIC | Age: 65
End: 2020-03-09

## 2020-03-09 DIAGNOSIS — Z79.4 TYPE 2 DIABETES MELLITUS WITHOUT COMPLICATION, WITH LONG-TERM CURRENT USE OF INSULIN (H): ICD-10-CM

## 2020-03-09 DIAGNOSIS — E11.9 TYPE 2 DIABETES MELLITUS WITHOUT COMPLICATION, WITH LONG-TERM CURRENT USE OF INSULIN (H): ICD-10-CM

## 2020-03-24 ENCOUNTER — COMMUNICATION - HEALTHEAST (OUTPATIENT)
Dept: ANTICOAGULATION | Facility: CLINIC | Age: 65
End: 2020-03-24

## 2020-03-24 ENCOUNTER — COMMUNICATION - HEALTHEAST (OUTPATIENT)
Dept: INTERNAL MEDICINE | Facility: CLINIC | Age: 65
End: 2020-03-24

## 2020-03-24 DIAGNOSIS — M62.838 MUSCLE SPASM: ICD-10-CM

## 2020-03-24 DIAGNOSIS — I48.20 CHRONIC ATRIAL FIBRILLATION (H): ICD-10-CM

## 2020-03-31 ENCOUNTER — VIRTUAL VISIT (OUTPATIENT)
Dept: URGENT CARE | Facility: CLINIC | Age: 65
End: 2020-03-31
Payer: COMMERCIAL

## 2020-03-31 DIAGNOSIS — R05.9 COUGH: Primary | ICD-10-CM

## 2020-03-31 DIAGNOSIS — R04.2 COUGHING UP BLOOD: ICD-10-CM

## 2020-03-31 PROCEDURE — 99207 ZZC NO BILLABLE SERVICE THIS VISIT: CPT | Performed by: STUDENT IN AN ORGANIZED HEALTH CARE EDUCATION/TRAINING PROGRAM

## 2020-03-31 NOTE — PATIENT INSTRUCTIONS
Based on your symptoms (coughing up blood &  dizziness and lightheadedness that gets worse with position changes), I recommend that you physically be evaluated by a provider. This can be at your PCP clinic or at a local Urgent Care. The number to call to schedule an Urgent Care visit is [phone].     Additionally, your symptoms could be due to COVID-19, but it also could be due to a number of other respiratory illnesses.  We are not doing testing at this time for COVID-19 unless symptoms are severe enough to require hospitalization.  It is recommended that you stay home to prevent the spread of your illness.  To do this follow these instructions:    Regardless of if you have been tested or not:  Patient who have symptoms (cough, fever, or shortness of breath), need to isolate for 7 days from when symptoms started AND 72 hours after fever resolves (without fever reducing medications) AND improvement of respiratory symptoms (whichever is longer).      Isolate yourself at home (in own room/own bathroom if possible)    Do Not allow any visitors    Do Not go to work or school    Do Not go to [employer],  centers, shopping, or other public places.    Do Not shake hands.    Avoid close and intimate contact with others (hugging, kissing).    Follow CDC recommendations for household cleaning of frequently touched services.     After the initial 7 days, continue to isolate yourself from household members as much as possible. To continue decrease the risk of community spread and exposure, you and any members of your household should limit activities in public for 14 days after starting home isolation.     You can reference the following CDC link for helpful home isolation/care tips:  https://www.cdc.gov/coronavirus/2019-ncov/downloads/10Things.pdf    Protect Others:    Cover your mouth and nose with a mask, disposable tissue or wash cloth to avoid spreading germs to others.    Wash your hands and face frequently with  soap and water.    Fever Medicines:    For fever relief, take acetaminophen or ibuprofen.    Treat fevers above 101  F (38.3  C) to lower fevers and make you more comfortable.     Acetaminophen (e.g., Tylenol): Take 650 mg (two 325 mg pills) by mouth every 4-6 hours as needed of regular strength Tylenol or 1,000 mg (two 500 mg pills) every 8 hours as needed of Extra Strength Tylenol.     Ibuprofen (e.g., Motrin, Advil): Take 400 mg (two 200 mg pills) by mouth every 6 hours as needed.     Acetaminophen is thought to be safer than ibuprofen or naproxen for people over 65 years old. Acetaminophen is in many OTC and prescription medicines. It might be in more than one medicine that you are taking. You need to be careful and not take an overdose. Before taking any medicine, read all the instructions on the package.    Caution - NSAIDs (e.g., ibuprofen, naproxen): Do not take nonsteroidal anti-inflammatory drugs (NSAIDs) if you have stomach problems, kidney disease, heart failure, or other contraindications to using this type of medicine. Do not take NSAID medicines for over 7 days without consulting your PCP. Do not take NSAID medicines if you are pregnant. Do not take NSAID medicines if you are also taking blood thinners.     Call Back If: Breathing difficulty develops or you become worse.    Thank you for limiting contact with others, wearing a simple mask to cover your cough, practice good hand hygiene habits and accessing our virtual services where possible to limit the spread of this virus.    For more information about COVID19 and options for caring for yourself at home, please visit the CDC website at https://www.cdc.gov/coronavirus/2019-ncov/about/steps-when-sick.html  For more options for care at [facility], please visit our website at [email or web address]     Instructions for Patients:     Based on your symptoms (coughing up blood &  dizziness and lightheadedness that gets  worse with position changes), I recommend that you physically be evaluated by a provider. This can be at your PCP clinic or at a local Urgent Care. The number to call to schedule an Urgent Care visit is [phone].     Additionally, your symptoms could be due to COVID-19, but it also could be due to a number of other respiratory illnesses.  We are not doing testing at this time for COVID-19 unless symptoms are severe enough to require hospitalization.  It is recommended that you stay home to prevent the spread of your illness.  To do this follow these instructions:    Regardless of if you have been tested or not:  Patient who have symptoms (cough, fever, or shortness of breath), need to isolate for 7 days from when symptoms started AND 72 hours after fever resolves (without fever reducing medications) AND improvement of respiratory symptoms (whichever is longer).      Isolate yourself at home (in own room/own bathroom if possible)    Do Not allow any visitors    Do Not go to work or school    Do Not go to [employer],  centers, shopping, or other public places.    Do Not shake hands.    Avoid close and intimate contact with others (hugging, kissing).    Follow CDC recommendations for household cleaning of frequently touched services.     After the initial 7 days, continue to isolate yourself from household members as much as possible. To continue decrease the risk of community spread and exposure, you and any members of your household should limit activities in public for 14 days after starting home isolation.     You can reference the following CDC link for helpful home isolation/care tips:  https://www.cdc.gov/coronavirus/2019-ncov/downloads/10Things.pdf    Protect Others:    Cover your mouth and nose with a mask, disposable tissue or wash cloth to avoid spreading germs to others.    Wash your hands and face frequently with soap and water.    Fever Medicines:    For fever relief, take acetaminophen or  ibuprofen.    Treat fevers above 101  F (38.3  C) to lower fevers and make you more comfortable.     Acetaminophen (e.g., Tylenol): Take 650 mg (two 325 mg pills) by mouth every 4-6 hours as needed of regular strength Tylenol or 1,000 mg (two 500 mg pills) every 8 hours as needed of Extra Strength Tylenol.     Ibuprofen (e.g., Motrin, Advil): Take 400 mg (two 200 mg pills) by mouth every 6 hours as needed.     Acetaminophen is thought to be safer than ibuprofen or naproxen for people over 65 years old. Acetaminophen is in many OTC and prescription medicines. It might be in more than one medicine that you are taking. You need to be careful and not take an overdose. Before taking any medicine, read all the instructions on the package.    Caution - NSAIDs (e.g., ibuprofen, naproxen): Do not take nonsteroidal anti-inflammatory drugs (NSAIDs) if you have stomach problems, kidney disease, heart failure, or other contraindications to using this type of medicine. Do not take NSAID medicines for over 7 days without consulting your PCP. Do not take NSAID medicines if you are pregnant. Do not take NSAID medicines if you are also taking blood thinners.     Call Back If: Breathing difficulty develops or you become worse.    Thank you for limiting contact with others, wearing a simple mask to cover your cough, practice good hand hygiene habits and accessing our virtual services where possible to limit the spread of this virus.    For more information about COVID19 and options for caring for yourself at home, please visit the CDC website at https://www.cdc.gov/coronavirus/2019-ncov/about/steps-when-sick.html  For more options for care at [facility], please visit our website at [email or web address]

## 2020-03-31 NOTE — LETTER
March 31, 2020      Obi Messer  [address]  [place] [address]        To Whom It May Concern:    Obi Messer was evaluated by [employer] on 3/31/2020.   Please excuse Obi Messer from work WITH restrictions until 4/6/2020.     *Restrictions prior to returning to work: Must be 72 hours without a fever and significant improvement in cough or shortness of breath. This may be after the date listed above.      Sincerely,  Leta Arteaga, DO

## 2020-03-31 NOTE — PROGRESS NOTES
I reviewed the telephone visit encounter and discussed the patient with the resident and agree with the resident s assessment and plan of care as documented.    Roderick Bailey MD MPH, Faculty, Family Medicine

## 2020-03-31 NOTE — PROGRESS NOTES
"The patient has been notified of following:     \"This telephone visit will be conducted via a call between you and your physician/provider. We have found that certain health care needs can be provided without the need for a physical exam.  This service lets us provide the care you need with a short phone conversation.  If a prescription is necessary we can send it directly to your pharmacy.  If lab work is needed we can place an order for that and you can then stop by our lab to have the test done at a later time.    If during the course of the call the physician/provider feels a telephone visit is not appropriate, you will not be charged for this service.\"     Subjective     CC: Obi Messer  is a 64 year old male with PMH of ischemic cardiomyopathy with resultant HFrEF, CAD, AFib, and HTN who presents to clinic today for the following health issues:   Chief Complaint   Patient presents with     Suspected Covid      The patient states that he's had a few days of flu-like symptoms. Last week, he developed a dry cough that then turned into a productive cough. He also had episodes of hemoptysis on Sunday and Monday. States he was coughing up quite a bit of blood but could not quantify. He has also been having headache, night sweats, myalgias, lightheadedness, orthostatic symptoms, and chest discomfort. He has been unable to check his temperature but feels feverish. He has been taking Tylenol for his symptoms.     Upon further evaluation of chest discomfort, the patient states his chest pain is located in the center of his chest, and it is sharp in nature. It is worse with inhalation. His chest pain is provoked by lying down.     Denies: Sore throat, nasal congestion.     Of note, the patient states that his blood pressure has also been low lately (90/60). His blood glucose has also been low (blood glucose 75). He has not had his INR checked in over a month.     Works as a telephone customer . "     History:  In the 14 days before your symptoms started, have you had close contact with a COVID-19 (Coronavirus) patient? No    In the 14 days before your symptoms started, have you traveled internationally or to a state with high rates of COVID19? No    Do you have a fever? I do not know    Are you having difficulty breathing? No, but does have shortness of breath    Do you have a cough? Yes      Is your coughing worse when you are exposed to pollen, dust, or other things in the environment? No      Are you coughing up any mucus? I am coughing up a lot of mucus.     What is the appearance of the mucus? The mucus is brown or reddish in color    Are you experiencing any of the following? None of these    What other symptoms have you experienced? Headache and Body aches    Do you have any of the following? Sweating at night, Loss of appetite and Fatigue    Have you ever been diagnosed with asthma, bronchitis, or lung disease? No    Do you smoke? No     Have you ever smoked? I have never smoked       Are there people you know with similar symptoms? No, does live in [facility]     Have you recently been hospitalized? No    Are you pregnant or breastfeeding?: No      Patient Active Problem List   Diagnosis     Anxiety state     Essential hypertension     Depressive disorder, not elsewhere classified     Atrial fibrillation (H)     CAD (coronary artery disease)     NSTEMI (non-ST elevated myocardial infarction) 2/22/13     Ischemic cardiomyopathy w/EF 25%     Chronic anticoagulation for a-fib; INR goal 2.0-2.5     Past Surgical History:   Procedure Laterality Date     BACK SURGERY       C NONSPECIFIC PROCEDURE      tonsillectomy     C NONSPECIFIC PROCEDURE      cholecystectomy     C NONSPECIFIC PROCEDURE  2004    C-spine fusion     CHOLECYSTECTOMY       ENT SURGERY         Social History     Tobacco Use     Smoking status: Current Some Day Smoker     Years: 40.00     Types: Cigars     Tobacco comment: 3-5 cigars  a week   Substance Use Topics     Alcohol use: Yes     Comment: Up to 2 drink weekly;  used to drink more     Family History   Problem Relation Age of Onset     C.A.D. Father          at age 63; had suffered mult heart attacks     Gastrointestinal Disease Mother          at age 68; some sort of liver problem         Current Outpatient Medications   Medication Sig Dispense Refill     aspirin EC 81 MG EC tablet Take 1 tablet by mouth daily. 100 tablet 5     atorvastatin (LIPITOR) 20 MG tablet Take 1 tablet by mouth daily. 90 tablet 3     Cholecalciferol (VITAMIN D3 PO) Take 1,000 Units by mouth daily.       clopidogrel (PLAVIX) 75 MG tablet Take 1 tablet by mouth daily. 90 tablet 10     Furosemide (LASIX) 20 MG tablet Take 2 tablets by mouth daily. 180 tablet 3     IBUPROFEN PO Take 800 mg by mouth as needed. Takes 4-200mg tabs as needed       lisinopril (PRINIVIL,ZESTRIL) 5 MG tablet Take 1 tablet by mouth 2 times daily. 90 tablet 5     LORazepam (ATIVAN) 1 MG tablet Take 1 tablet by mouth every 8 hours as needed. 20 tablet 0     metoprolol (TOPROL-XL) 50 MG 24 hr tablet Take 1.5 tablets (75 mg) by mouth daily 135 tablet 3     pantoprazole (PROTONIX) 40 MG enteric coated tablet Take 1 tablet by mouth every morning (before breakfast). 90 tablet 5     QUEtiapine Fumarate (SEROQUEL PO) Take 50 mg by mouth 2 times daily. But patient reports only taking once daily.  Says he forgets second dose       spironolactone (ALDACTONE) 25 MG tablet Take 0.5 tablets by mouth daily. 45 tablet 3     venlafaxine (EFFEXOR-ER) 150 MG TB24 Take 150 mg by mouth daily. Patient reports taking one daily but directions on bottle are twice daily (confirmed with pharmacy)       warfarin (COUMADIN) 5 MG tablet Take 1.5 tabs (7.5 mg) M,W,F & 1 tab (5 mg) all other days once/day 30 tablet      ZOLPIDEM TARTRATE PO Take 10 mg by mouth nightly as needed. Found dose with pharmacy       Allergies   Allergen Reactions     No Known Drug Allergies              Review of Systems   ROS COMP: Constitutional, HEENT, cardiovascular, pulmonary, gi and gu systems are negative, except as otherwise noted.      Objective   Gen: Patient is alert, oriented  Resp: The patient is speaking in full sentences on room air     Diagnostic Test Results:  Labs reviewed in Epic  Hgb - 14.1 (2/6/2020)   INR - 2.1 (2/6/2020)       Assessment/Plan:  Mr. Messer is a 65yo male with PMH of ischemic cardiomyopathy, HFrEF, CAD, AFib on warfarin, and HTN who presents with symptoms consistent with viral URI and hemoptysis.     The patient likely has a viral URI, however I cannot rule out COVID-19. I am mostly concerned about the patient's hemoptysis with concurrent hypotension and orthostatic symptoms. He has not had his INR checked in over 1 month. Based on these symptoms, I recommended that the patient be evaluated urgently, either at PCP clinic or Urgent Care.     I also recommended that he self-quarantine for a minimum of 7 days based on symptoms. A work letter was provided for the patient, which is available to him through My Chart.      Phone call duration:  25 minutes    This patient was discussed with my attending, Dr. Bailey.     Leta Arteaga, DO  PGY2 Internal Medicine

## 2020-04-01 ENCOUNTER — OFFICE VISIT (OUTPATIENT)
Dept: URGENT CARE | Facility: URGENT CARE | Age: 65
End: 2020-04-01
Payer: COMMERCIAL

## 2020-04-01 ENCOUNTER — ANCILLARY PROCEDURE (OUTPATIENT)
Dept: GENERAL RADIOLOGY | Facility: CLINIC | Age: 65
End: 2020-04-01
Attending: INTERNAL MEDICINE
Payer: COMMERCIAL

## 2020-04-01 VITALS
OXYGEN SATURATION: 96 % | DIASTOLIC BLOOD PRESSURE: 80 MMHG | SYSTOLIC BLOOD PRESSURE: 114 MMHG | TEMPERATURE: 98.1 F | HEART RATE: 80 BPM

## 2020-04-01 DIAGNOSIS — J22 LRTI (LOWER RESPIRATORY TRACT INFECTION): ICD-10-CM

## 2020-04-01 DIAGNOSIS — Z20.822 SUSPECTED COVID-19 VIRUS INFECTION: Primary | ICD-10-CM

## 2020-04-01 DIAGNOSIS — Z79.01 LONG TERM CURRENT USE OF ANTICOAGULANT THERAPY: ICD-10-CM

## 2020-04-01 DIAGNOSIS — R05.8 PRODUCTIVE COUGH: ICD-10-CM

## 2020-04-01 DIAGNOSIS — R06.02 SOB (SHORTNESS OF BREATH): ICD-10-CM

## 2020-04-01 DIAGNOSIS — R04.2 HEMOPTYSIS: ICD-10-CM

## 2020-04-01 LAB
BASOPHILS # BLD AUTO: 0 10E9/L (ref 0–0.2)
BASOPHILS NFR BLD AUTO: 0.1 %
DIFFERENTIAL METHOD BLD: NORMAL
EOSINOPHIL # BLD AUTO: 0.2 10E9/L (ref 0–0.7)
EOSINOPHIL NFR BLD AUTO: 2.1 %
ERYTHROCYTE [DISTWIDTH] IN BLOOD BY AUTOMATED COUNT: 14.9 % (ref 10–15)
HCT VFR BLD AUTO: 42.9 % (ref 40–53)
HGB BLD-MCNC: 14.1 G/DL (ref 13.3–17.7)
INR PPP: 3 (ref 0.86–1.14)
INR PPP: 3 (ref 0.9–1.1)
LYMPHOCYTES # BLD AUTO: 2.3 10E9/L (ref 0.8–5.3)
LYMPHOCYTES NFR BLD AUTO: 25.7 %
MCH RBC QN AUTO: 29.6 PG (ref 26.5–33)
MCHC RBC AUTO-ENTMCNC: 32.9 G/DL (ref 31.5–36.5)
MCV RBC AUTO: 90 FL (ref 78–100)
MONOCYTES # BLD AUTO: 0.7 10E9/L (ref 0–1.3)
MONOCYTES NFR BLD AUTO: 7.6 %
NEUTROPHILS # BLD AUTO: 5.7 10E9/L (ref 1.6–8.3)
NEUTROPHILS NFR BLD AUTO: 64.5 %
PLATELET # BLD AUTO: 230 10E9/L (ref 150–450)
RBC # BLD AUTO: 4.76 10E12/L (ref 4.4–5.9)
WBC # BLD AUTO: 8.9 10E9/L (ref 4–11)

## 2020-04-01 PROCEDURE — 99205 OFFICE O/P NEW HI 60 MIN: CPT | Performed by: INTERNAL MEDICINE

## 2020-04-01 PROCEDURE — 80048 BASIC METABOLIC PNL TOTAL CA: CPT | Performed by: INTERNAL MEDICINE

## 2020-04-01 PROCEDURE — 85025 COMPLETE CBC W/AUTO DIFF WBC: CPT | Performed by: INTERNAL MEDICINE

## 2020-04-01 PROCEDURE — 71046 X-RAY EXAM CHEST 2 VIEWS: CPT

## 2020-04-01 PROCEDURE — 36415 COLL VENOUS BLD VENIPUNCTURE: CPT | Performed by: INTERNAL MEDICINE

## 2020-04-01 PROCEDURE — 85610 PROTHROMBIN TIME: CPT | Performed by: INTERNAL MEDICINE

## 2020-04-01 RX ORDER — CEFDINIR 300 MG/1
300 CAPSULE ORAL 2 TIMES DAILY
Qty: 20 CAPSULE | Refills: 0 | Status: SHIPPED | OUTPATIENT
Start: 2020-04-01 | End: 2020-04-11

## 2020-04-01 ASSESSMENT — ENCOUNTER SYMPTOMS
COUGH: 1
LIGHT-HEADEDNESS: 1
SHORTNESS OF BREATH: 1
SORE THROAT: 0
MYALGIAS: 1
CHILLS: 1
FEVER: 1
APPETITE CHANGE: 1
FATIGUE: 1
EYE DISCHARGE: 0
ADENOPATHY: 0
DIAPHORESIS: 1
CONFUSION: 1
SLEEP DISTURBANCE: 1
RHINORRHEA: 1
HEADACHES: 1

## 2020-04-01 NOTE — PROGRESS NOTES
SUBJECTIVE:   Obi Messer is a 64 year old male presenting with a chief complaint of   Chief Complaint   Patient presents with     Suspected Covid     Due to being covid suspect wore  PPE    He is a new patient of [facility].      Suspect Covid - Adult    Onset of symptoms was 1 week(s) ago.  Course of illness is worsening.      Current and Associated symptoms:  Feeling sick  Started feverish, chills, sweats, cough  body aches and fatigue    Dry cough initially which turned into productive cough over the weekend  Bloody cough X 3 days  Still coughing up blood today.  Lightheaded  Dizzy  shortness of breath,     Treatment measures tried include Tylenol  For fever feeling - last dose this morning  .  Predisposing factors include ill contact:   Denies exposure to covid.  Denies international travel or travel to state with covid rate.    tobacco use  - quit 2013    Last INR 2.1 February 6, 2020    Hx borderline elevated white count followed by specialist    Review of Systems   Constitutional: Positive for appetite change, chills, diaphoresis, fatigue and fever.   HENT: Positive for rhinorrhea. Negative for congestion and sore throat.    Eyes: Negative for discharge.   Respiratory: Positive for cough and shortness of breath.    Cardiovascular: Positive for chest pain (Sharp chest pain in center of chest.  Worse with breathing and lying flat.).        Took few lasix this week due to swelling ankles & fingers   Gastrointestinal:        Nausea & diarrhea could be side effects metformin   Endocrine:        Blood sugar 75 at home   Genitourinary: Negative for decreased urine volume.   Musculoskeletal: Positive for myalgias.   Skin: Negative for rash.   Allergic/Immunologic: Negative for environmental allergies.   Neurological: Positive for light-headedness (Blood pressure 90/60 at home) and headaches.   Hematological: Negative for adenopathy.   Psychiatric/Behavioral: Positive for confusion (feels like vocabulary is not as  good) and sleep disturbance (due to cough, knee pain).       Past Medical History:   Diagnosis Date     anxiety disorder     Managed by psychiatrist     CAD (coronary artery disease) 2013     Depressive disorder, not elsewhere classified     Managed by psychiatrist     Ischemic cardiomyopathy 3/3/2013     NSTEMI (non-ST elevated myocardial infarction) (H) 3/3/2013     Unspecified essential hypertension      Family History   Problem Relation Age of Onset     C.A.D. Father          at age 63; had suffered mult heart attacks     Gastrointestinal Disease Mother          at age 68; some sort of liver problem     Current Outpatient Medications   Medication Sig Dispense Refill     aspirin EC 81 MG EC tablet Take 1 tablet by mouth daily. 100 tablet 5     atorvastatin (LIPITOR) 20 MG tablet Take 1 tablet by mouth daily. 90 tablet 3     Cholecalciferol (VITAMIN D3 PO) Take 1,000 Units by mouth daily.       clopidogrel (PLAVIX) 75 MG tablet Take 1 tablet by mouth daily. 90 tablet 10     Furosemide (LASIX) 20 MG tablet Take 2 tablets by mouth daily. 180 tablet 3     IBUPROFEN PO Take 800 mg by mouth as needed. Takes 4-200mg tabs as needed       lisinopril (PRINIVIL,ZESTRIL) 5 MG tablet Take 1 tablet by mouth 2 times daily. 90 tablet 5     LORazepam (ATIVAN) 1 MG tablet Take 1 tablet by mouth every 8 hours as needed. 20 tablet 0     metoprolol (TOPROL-XL) 50 MG 24 hr tablet Take 1.5 tablets (75 mg) by mouth daily 135 tablet 3     pantoprazole (PROTONIX) 40 MG enteric coated tablet Take 1 tablet by mouth every morning (before breakfast). 90 tablet 5     QUEtiapine Fumarate (SEROQUEL PO) Take 50 mg by mouth 2 times daily. But patient reports only taking once daily.  Says he forgets second dose       spironolactone (ALDACTONE) 25 MG tablet Take 0.5 tablets by mouth daily. 45 tablet 3     venlafaxine (EFFEXOR-ER) 150 MG TB24 Take 150 mg by mouth daily. Patient reports taking one daily but directions on bottle are twice  daily (confirmed with pharmacy)       warfarin (COUMADIN) 5 MG tablet Take 1.5 tabs (7.5 mg) M,W,F & 1 tab (5 mg) all other days once/day 30 tablet      ZOLPIDEM TARTRATE PO Take 10 mg by mouth nightly as needed. Found dose with pharmacy       Social History     Tobacco Use     Smoking status: Former Smoker     Years: 40.00     Types: Cigars     Last attempt to quit: 2013     Years since quittin.0     Tobacco comment: 3-5 cigars a week   Substance Use Topics     Alcohol use: Yes     Comment: Up to 2 drink weekly;  used to drink more       OBJECTIVE  /80   Pulse 80   Temp 98.1  F (36.7  C)   SpO2 96%     Physical Exam  Vitals signs reviewed.   Constitutional:       Appearance: Normal appearance. He is ill-appearing. He is not toxic-appearing or diaphoretic.   HENT:      Right Ear: Tympanic membrane normal.      Left Ear: Tympanic membrane normal.      Nose: Nose normal.      Mouth/Throat:      Mouth: Mucous membranes are moist.      Pharynx: Oropharynx is clear.   Eyes:      Conjunctiva/sclera: Conjunctivae normal.   Cardiovascular:      Rate and Rhythm: Normal rate. Rhythm irregular.      Pulses: Normal pulses.      Heart sounds: Normal heart sounds.   Pulmonary:      Effort: Pulmonary effort is normal.      Breath sounds: Normal breath sounds.      Comments: Deep cough  Chest:      Chest wall: Tenderness (With sternal palpation) present.   Abdominal:      Palpations: Abdomen is soft.      Tenderness: There is no abdominal tenderness.   Musculoskeletal:      Right lower leg: No edema.      Left lower leg: No edema.   Skin:     Findings: No rash.   Neurological:      General: No focal deficit present.      Mental Status: He is alert.   Psychiatric:         Mood and Affect: Mood normal.         Labs:  Results for orders placed or performed in visit on 20 (from the past 24 hour(s))   INR   Result Value Ref Range    INR 3.00 (H) 0.86 - 1.14   CBC with platelets and differential   Result Value Ref  Range    WBC 8.9 4.0 - 11.0 10e9/L    RBC Count 4.76 4.4 - 5.9 10e12/L    Hemoglobin 14.1 13.3 - 17.7 g/dL    Hematocrit 42.9 40.0 - 53.0 %    MCV 90 78 - 100 fl    MCH 29.6 26.5 - 33.0 pg    MCHC 32.9 31.5 - 36.5 g/dL    RDW 14.9 10.0 - 15.0 %    Platelet Count 230 150 - 450 10e9/L    Diff Method Automated Method     % Neutrophils 64.5 %    % Lymphocytes 25.7 %    % Monocytes 7.6 %    % Eosinophils 2.1 %    % Basophils 0.1 %    Absolute Neutrophil 5.7 1.6 - 8.3 10e9/L    Absolute Lymphocytes 2.3 0.8 - 5.3 10e9/L    Absolute Monocytes 0.7 0.0 - 1.3 10e9/L    Absolute Eosinophils 0.2 0.0 - 0.7 10e9/L    Absolute Basophils 0.0 0.0 - 0.2 10e9/L       X-Ray was done, my findings are: Compared to previous x-ray.  No obvious infiltrate or patchiness from hemoptysis noted    ASSESSMENT:      ICD-10-CM    1. Suspected Covid-19 Virus Infection  R68.89 INR     CBC with platelets and differential     XR Chest 2 Views     Basic metabolic panel   2. Long term current use of anticoagulant therapy  Z79.01 INR     CBC with platelets and differential     XR Chest 2 Views     Basic metabolic panel   3. Hemoptysis  R04.2 INR     CBC with platelets and differential     XR Chest 2 Views     Basic metabolic panel   4. Productive cough  R05 INR     CBC with platelets and differential     XR Chest 2 Views     Basic metabolic panel   5. SOB (shortness of breath)  R06.02 INR     CBC with platelets and differential     XR Chest 2 Views     Basic metabolic panel   6. LRTI (lower respiratory tract infection)  J22       Discussed all below with patient in detail on the phone.  Medical Decision Making:    Concern for respiratory symptoms with hemoptysis.  In addition Covid, also worry about elevated INR causing hemoptysis in the lungs, community-acquired pneumonia.    Covid suspect.  Concern for hemoptysis.    Chest x-ray did not show any patchy infiltrate to suggest bleeding into the lungs or pneumonia.  Could alone be due to concurrent illness  with elevated INR of 3.  Instructed to hold Coumadin and follow-up with INR clinic tomorrow.  Placed on antibiotic as cough productive and complicated by hemoptysis.    Will require close follow-up due to concern for potential  Covid    Patient would be considered high risk for Covid complications    As his white count was normal, he appeared comfortable and had normal vital signs with normal lung exam I felt it was appropriate for close follow-up.  I did not think he needed to go to the emergency room.    I requested patient to follow-up by phone in 1 to 2 days and go to the ER if symptoms of trouble breathing or call 911      Patient took a few doses of Lasix this week.  Discussed with patient he should be careful with taking Lasix as that may have made his blood pressure lower and causes dizziness.    Metabolic panel checked and pending and should be back tomorrow evening      Serious Comorbid Conditions:  Coronary artery disease, atrial fibrillation, ischemic cardiomyopathy, hypertension, Adult:  CHF and Anticoagulation    Metabolic panel pending and patient will receive results per my chart          Patient Instructions               Regardless of if you have been tested or not:  Patient who have symptoms (cough, fever, or shortness of breath), need to isolate for 7 days from when symptoms started AND 72 hours after fever resolves (without fever reducing medications) AND improvement of respiratory symptoms (whichever is longer).      Isolate yourself at home (in own room/own bathroom if possible)    Do Not allow any visitors    Do Not go to work or school    Do Not go to [place],  centers, shopping, or other public places.    Do Not shake hands.    Avoid close and intimate contact with others (hugging, kissing).    Follow CDC recommendations for household cleaning of frequently touched services.     After the initial 7 days, continue to isolate yourself from household members as much as possible. To  continue decrease the risk of community spread and exposure, you and any members of your household should limit activities in public for 14 days after starting home isolation.     You can reference the following CDC link for helpful home isolation/care tips:  https://www.cdc.gov/coronavirus/2019-ncov/downloads/10Things.pdf    Protect Others:    Cover Your Mouth and Nose with a mask, disposable tissue or wash cloth to avoid spreading germs to others.    Wash your hands and face frequently with soap and water    Call Back If: Breathing difficulty develops or you become worse.    For more information about COVID19 and options for caring for yourself at home, please visit the CDC website at https://www.cdc.gov/coronavirus/2019-ncov/about/steps-when-sick.html  For more options for care at [facility], please visit our website at [email or web address]

## 2020-04-01 NOTE — LETTER
[facility]  [address] [place] [address]  Phone: [phone]    April 1, 2020        Obi Messer  [address]   [place] [address]          To whom it may concern:    RE: Obi Singhflorida    Patient was seen and treated today at our clinic and missed work.  Potentially may miss up to 2 weeks of work with close follow up with primary clinic.    Please contact me for questions or concerns.      Sincerely,        [facility] Provider

## 2020-04-01 NOTE — PATIENT INSTRUCTIONS
Take omnicef 2 x day for 10 days    Contact INR clinic tomorrow for adjustment    Followup:  Phone call follow-up in 1 to 2 days.  To ER if increased trouble breathing.          Regardless of if you have been tested or not:  Patient who have symptoms (cough, fever, or shortness of breath), need to isolate for 7 days from when symptoms started AND 72 hours after fever resolves (without fever reducing medications) AND improvement of respiratory symptoms (whichever is longer).      Isolate yourself at home (in own room/own bathroom if possible)    Do Not allow any visitors    Do Not go to work or school    Do Not go to [employer],  centers, shopping, or other public places.    Do Not shake hands.    Avoid close and intimate contact with others (hugging, kissing).    Follow CDC recommendations for household cleaning of frequently touched services.     After the initial 7 days, continue to isolate yourself from household members as much as possible. To continue decrease the risk of community spread and exposure, you and any members of your household should limit activities in public for 14 days after starting home isolation.     You can reference the following CDC link for helpful home isolation/care tips:  https://www.cdc.gov/coronavirus/2019-ncov/downloads/10Things.pdf    Protect Others:    Cover Your Mouth and Nose with a mask, disposable tissue or wash cloth to avoid spreading germs to others.    Wash your hands and face frequently with soap and water    Call Back If: Breathing difficulty develops or you become worse.    For more information about COVID19 and options for caring for yourself at home, please visit the CDC website at https://www.cdc.gov/coronavirus/2019-ncov/about/steps-when-sick.html  For more options for care at [facility], please visit our website at [email or web address]

## 2020-04-02 LAB
ANION GAP SERPL CALCULATED.3IONS-SCNC: 6 MMOL/L (ref 3–14)
BUN SERPL-MCNC: 11 MG/DL (ref 7–30)
CALCIUM SERPL-MCNC: 9.1 MG/DL (ref 8.5–10.1)
CHLORIDE SERPL-SCNC: 108 MMOL/L (ref 94–109)
CO2 SERPL-SCNC: 24 MMOL/L (ref 20–32)
CREAT SERPL-MCNC: 0.63 MG/DL (ref 0.66–1.25)
GFR SERPL CREATININE-BSD FRML MDRD: >90 ML/MIN/{1.73_M2}
GLUCOSE SERPL-MCNC: 122 MG/DL (ref 70–99)
POTASSIUM SERPL-SCNC: 4 MMOL/L (ref 3.4–5.3)
SODIUM SERPL-SCNC: 138 MMOL/L (ref 133–144)

## 2020-04-03 ENCOUNTER — COMMUNICATION - HEALTHEAST (OUTPATIENT)
Dept: ANTICOAGULATION | Facility: CLINIC | Age: 65
End: 2020-04-03

## 2020-04-03 DIAGNOSIS — I48.20 CHRONIC ATRIAL FIBRILLATION (H): ICD-10-CM

## 2020-04-06 ENCOUNTER — COMMUNICATION - HEALTHEAST (OUTPATIENT)
Dept: INTERNAL MEDICINE | Facility: CLINIC | Age: 65
End: 2020-04-06

## 2020-04-06 ENCOUNTER — OFFICE VISIT - HEALTHEAST (OUTPATIENT)
Dept: INTERNAL MEDICINE | Facility: CLINIC | Age: 65
End: 2020-04-06

## 2020-04-06 DIAGNOSIS — E11.9 TYPE 2 DIABETES MELLITUS WITHOUT COMPLICATION, WITHOUT LONG-TERM CURRENT USE OF INSULIN (H): ICD-10-CM

## 2020-04-06 DIAGNOSIS — I48.20 CHRONIC ATRIAL FIBRILLATION (H): ICD-10-CM

## 2020-04-06 DIAGNOSIS — J06.9 VIRAL URI WITH COUGH: ICD-10-CM

## 2020-04-08 ENCOUNTER — COMMUNICATION - HEALTHEAST (OUTPATIENT)
Dept: INTERNAL MEDICINE | Facility: CLINIC | Age: 65
End: 2020-04-08

## 2020-04-08 DIAGNOSIS — E78.2 MIXED HYPERLIPIDEMIA: ICD-10-CM

## 2020-04-09 ENCOUNTER — COMMUNICATION - HEALTHEAST (OUTPATIENT)
Dept: INTERNAL MEDICINE | Facility: CLINIC | Age: 65
End: 2020-04-09

## 2020-04-13 ENCOUNTER — COMMUNICATION - HEALTHEAST (OUTPATIENT)
Dept: EDUCATION SERVICES | Facility: CLINIC | Age: 65
End: 2020-04-13

## 2020-04-13 DIAGNOSIS — E11.9 TYPE 2 DIABETES MELLITUS WITHOUT COMPLICATION, WITHOUT LONG-TERM CURRENT USE OF INSULIN (H): ICD-10-CM

## 2020-04-14 ENCOUNTER — COMMUNICATION - HEALTHEAST (OUTPATIENT)
Dept: ANTICOAGULATION | Facility: CLINIC | Age: 65
End: 2020-04-14

## 2020-04-21 ENCOUNTER — COMMUNICATION - HEALTHEAST (OUTPATIENT)
Dept: ANTICOAGULATION | Facility: CLINIC | Age: 65
End: 2020-04-21

## 2020-04-21 ENCOUNTER — AMBULATORY - HEALTHEAST (OUTPATIENT)
Dept: LAB | Facility: CLINIC | Age: 65
End: 2020-04-21

## 2020-04-21 DIAGNOSIS — I48.91 A-FIB (H): ICD-10-CM

## 2020-04-21 DIAGNOSIS — I48.20 CHRONIC ATRIAL FIBRILLATION (H): ICD-10-CM

## 2020-04-21 LAB — INR PPP: 1.6 (ref 0.9–1.1)

## 2020-04-29 ENCOUNTER — COMMUNICATION - HEALTHEAST (OUTPATIENT)
Dept: EDUCATION SERVICES | Facility: CLINIC | Age: 65
End: 2020-04-29

## 2020-05-01 ENCOUNTER — COMMUNICATION - HEALTHEAST (OUTPATIENT)
Dept: INTERNAL MEDICINE | Facility: CLINIC | Age: 65
End: 2020-05-01

## 2020-05-01 DIAGNOSIS — M62.838 MUSCLE SPASM: ICD-10-CM

## 2020-05-01 DIAGNOSIS — E11.9 TYPE 2 DIABETES MELLITUS WITHOUT COMPLICATION, WITHOUT LONG-TERM CURRENT USE OF INSULIN (H): ICD-10-CM

## 2020-05-04 ENCOUNTER — COMMUNICATION - HEALTHEAST (OUTPATIENT)
Dept: PHARMACY | Facility: CLINIC | Age: 65
End: 2020-05-04

## 2020-05-05 ENCOUNTER — COMMUNICATION - HEALTHEAST (OUTPATIENT)
Dept: INTERNAL MEDICINE | Facility: CLINIC | Age: 65
End: 2020-05-05

## 2020-05-05 DIAGNOSIS — M25.561 CHRONIC PAIN OF RIGHT KNEE: ICD-10-CM

## 2020-05-05 DIAGNOSIS — G89.29 CHRONIC PAIN OF RIGHT KNEE: ICD-10-CM

## 2020-05-05 DIAGNOSIS — M25.551 HIP PAIN, RIGHT: ICD-10-CM

## 2020-05-07 ENCOUNTER — COMMUNICATION - HEALTHEAST (OUTPATIENT)
Dept: ANTICOAGULATION | Facility: CLINIC | Age: 65
End: 2020-05-07

## 2020-05-07 ENCOUNTER — AMBULATORY - HEALTHEAST (OUTPATIENT)
Dept: LAB | Facility: CLINIC | Age: 65
End: 2020-05-07

## 2020-05-07 DIAGNOSIS — I48.20 CHRONIC ATRIAL FIBRILLATION (H): ICD-10-CM

## 2020-05-07 DIAGNOSIS — I48.91 A-FIB (H): ICD-10-CM

## 2020-05-07 LAB — INR PPP: 1.9 (ref 0.9–1.1)

## 2020-05-08 ENCOUNTER — AMBULATORY - HEALTHEAST (OUTPATIENT)
Dept: PHARMACY | Facility: CLINIC | Age: 65
End: 2020-05-08

## 2020-05-08 DIAGNOSIS — I25.83 CORONARY ARTERY DISEASE DUE TO LIPID RICH PLAQUE: ICD-10-CM

## 2020-05-08 DIAGNOSIS — E11.9 TYPE 2 DIABETES MELLITUS WITHOUT COMPLICATION, WITHOUT LONG-TERM CURRENT USE OF INSULIN (H): ICD-10-CM

## 2020-05-08 DIAGNOSIS — R79.89 LOW TESTOSTERONE IN MALE: ICD-10-CM

## 2020-05-08 DIAGNOSIS — I50.42 CHRONIC COMBINED SYSTOLIC AND DIASTOLIC CONGESTIVE HEART FAILURE (H): ICD-10-CM

## 2020-05-08 DIAGNOSIS — I48.20 CHRONIC ATRIAL FIBRILLATION (H): ICD-10-CM

## 2020-05-08 DIAGNOSIS — I25.10 CORONARY ARTERY DISEASE DUE TO LIPID RICH PLAQUE: ICD-10-CM

## 2020-05-22 ENCOUNTER — COMMUNICATION - HEALTHEAST (OUTPATIENT)
Dept: ANTICOAGULATION | Facility: CLINIC | Age: 65
End: 2020-05-22

## 2020-05-22 ENCOUNTER — COMMUNICATION - HEALTHEAST (OUTPATIENT)
Dept: INTERNAL MEDICINE | Facility: CLINIC | Age: 65
End: 2020-05-22

## 2020-05-22 ENCOUNTER — AMBULATORY - HEALTHEAST (OUTPATIENT)
Dept: LAB | Facility: CLINIC | Age: 65
End: 2020-05-22

## 2020-05-22 DIAGNOSIS — I48.91 A-FIB (H): ICD-10-CM

## 2020-05-22 DIAGNOSIS — I50.42 CHRONIC COMBINED SYSTOLIC AND DIASTOLIC CONGESTIVE HEART FAILURE (H): ICD-10-CM

## 2020-05-22 DIAGNOSIS — R79.89 LOW TESTOSTERONE IN MALE: ICD-10-CM

## 2020-05-22 DIAGNOSIS — I48.20 CHRONIC ATRIAL FIBRILLATION (H): ICD-10-CM

## 2020-05-22 DIAGNOSIS — E11.9 TYPE 2 DIABETES MELLITUS WITHOUT COMPLICATION, WITHOUT LONG-TERM CURRENT USE OF INSULIN (H): ICD-10-CM

## 2020-05-22 LAB
ALBUMIN SERPL-MCNC: 3.8 G/DL (ref 3.5–5)
ALP SERPL-CCNC: 97 U/L (ref 45–120)
ALT SERPL W P-5'-P-CCNC: 21 U/L (ref 0–45)
ANION GAP SERPL CALCULATED.3IONS-SCNC: 16 MMOL/L (ref 5–18)
AST SERPL W P-5'-P-CCNC: 18 U/L (ref 0–40)
BILIRUB SERPL-MCNC: 0.5 MG/DL (ref 0–1)
BUN SERPL-MCNC: 19 MG/DL (ref 8–22)
CALCIUM SERPL-MCNC: 9.1 MG/DL (ref 8.5–10.5)
CHLORIDE BLD-SCNC: 104 MMOL/L (ref 98–107)
CO2 SERPL-SCNC: 19 MMOL/L (ref 22–31)
CREAT SERPL-MCNC: 0.84 MG/DL (ref 0.7–1.3)
GFR SERPL CREATININE-BSD FRML MDRD: >60 ML/MIN/1.73M2
GLUCOSE BLD-MCNC: 57 MG/DL (ref 70–125)
HBA1C MFR BLD: 6.7 % (ref 3.5–6)
INR PPP: 2.2 (ref 0.9–1.1)
POTASSIUM BLD-SCNC: 3.5 MMOL/L (ref 3.5–5)
PROT SERPL-MCNC: 6.9 G/DL (ref 6–8)
SODIUM SERPL-SCNC: 139 MMOL/L (ref 136–145)

## 2020-05-24 ENCOUNTER — COMMUNICATION - HEALTHEAST (OUTPATIENT)
Dept: INTERNAL MEDICINE | Facility: CLINIC | Age: 65
End: 2020-05-24

## 2020-05-24 DIAGNOSIS — I48.20 CHRONIC ATRIAL FIBRILLATION (H): ICD-10-CM

## 2020-05-24 DIAGNOSIS — R60.9 EDEMA, UNSPECIFIED TYPE: ICD-10-CM

## 2020-05-24 DIAGNOSIS — Z79.01 LONG TERM (CURRENT) USE OF ANTICOAGULANTS: ICD-10-CM

## 2020-05-26 ENCOUNTER — OFFICE VISIT - HEALTHEAST (OUTPATIENT)
Dept: INTERNAL MEDICINE | Facility: CLINIC | Age: 65
End: 2020-05-26

## 2020-05-26 DIAGNOSIS — E78.2 MIXED HYPERLIPIDEMIA: ICD-10-CM

## 2020-05-26 DIAGNOSIS — I48.20 CHRONIC ATRIAL FIBRILLATION (H): ICD-10-CM

## 2020-05-26 DIAGNOSIS — G89.29 CHRONIC PAIN OF RIGHT KNEE: ICD-10-CM

## 2020-05-26 DIAGNOSIS — I10 ESSENTIAL HYPERTENSION: ICD-10-CM

## 2020-05-26 DIAGNOSIS — M25.561 CHRONIC PAIN OF RIGHT KNEE: ICD-10-CM

## 2020-05-26 DIAGNOSIS — E11.9 TYPE 2 DIABETES MELLITUS WITHOUT COMPLICATION, WITHOUT LONG-TERM CURRENT USE OF INSULIN (H): ICD-10-CM

## 2020-05-26 DIAGNOSIS — E66.01 MORBID OBESITY (H): ICD-10-CM

## 2020-05-26 ASSESSMENT — PATIENT HEALTH QUESTIONNAIRE - PHQ9: SUM OF ALL RESPONSES TO PHQ QUESTIONS 1-9: 2

## 2020-05-27 ENCOUNTER — COMMUNICATION - HEALTHEAST (OUTPATIENT)
Dept: INTERNAL MEDICINE | Facility: CLINIC | Age: 65
End: 2020-05-27

## 2020-05-27 LAB
SHBG SERPL-SCNC: 49 NMOL/L (ref 11–80)
TESTOST FREE SERPL-MCNC: 3.51 NG/DL (ref 4.7–24.4)
TESTOST SERPL-MCNC: 236 NG/DL (ref 240–950)

## 2020-06-09 ENCOUNTER — OFFICE VISIT - HEALTHEAST (OUTPATIENT)
Dept: INTERNAL MEDICINE | Facility: CLINIC | Age: 65
End: 2020-06-09

## 2020-06-09 ENCOUNTER — AMBULATORY - HEALTHEAST (OUTPATIENT)
Dept: PHARMACY | Facility: CLINIC | Age: 65
End: 2020-06-09

## 2020-06-09 ENCOUNTER — COMMUNICATION - HEALTHEAST (OUTPATIENT)
Dept: PHARMACY | Facility: CLINIC | Age: 65
End: 2020-06-09

## 2020-06-09 DIAGNOSIS — E11.9 TYPE 2 DIABETES MELLITUS WITHOUT COMPLICATION, WITHOUT LONG-TERM CURRENT USE OF INSULIN (H): ICD-10-CM

## 2020-06-09 DIAGNOSIS — I48.20 CHRONIC ATRIAL FIBRILLATION (H): ICD-10-CM

## 2020-06-09 DIAGNOSIS — R79.89 LOW TESTOSTERONE IN MALE: ICD-10-CM

## 2020-06-09 DIAGNOSIS — E29.1 HYPOGONADISM MALE: ICD-10-CM

## 2020-06-09 PROCEDURE — 99607 MTMS BY PHARM ADDL 15 MIN: CPT | Performed by: PHARMACIST

## 2020-06-09 PROCEDURE — 99606 MTMS BY PHARM EST 15 MIN: CPT | Performed by: PHARMACIST

## 2020-06-10 ENCOUNTER — COMMUNICATION - HEALTHEAST (OUTPATIENT)
Dept: INTERNAL MEDICINE | Facility: CLINIC | Age: 65
End: 2020-06-10

## 2020-06-12 ENCOUNTER — COMMUNICATION - HEALTHEAST (OUTPATIENT)
Dept: ANTICOAGULATION | Facility: CLINIC | Age: 65
End: 2020-06-12

## 2020-06-17 ENCOUNTER — COMMUNICATION - HEALTHEAST (OUTPATIENT)
Dept: ANTICOAGULATION | Facility: CLINIC | Age: 65
End: 2020-06-17

## 2020-06-17 ENCOUNTER — AMBULATORY - HEALTHEAST (OUTPATIENT)
Dept: LAB | Facility: CLINIC | Age: 65
End: 2020-06-17

## 2020-06-17 DIAGNOSIS — I48.20 CHRONIC ATRIAL FIBRILLATION (H): ICD-10-CM

## 2020-06-17 DIAGNOSIS — I48.91 A-FIB (H): ICD-10-CM

## 2020-06-17 LAB — INR PPP: 2.5 (ref 0.9–1.1)

## 2020-06-18 ENCOUNTER — COMMUNICATION - HEALTHEAST (OUTPATIENT)
Dept: PHARMACY | Facility: CLINIC | Age: 65
End: 2020-06-18

## 2020-07-16 ENCOUNTER — COMMUNICATION - HEALTHEAST (OUTPATIENT)
Dept: INTERNAL MEDICINE | Facility: CLINIC | Age: 65
End: 2020-07-16

## 2020-07-16 DIAGNOSIS — M62.838 MUSCLE SPASM: ICD-10-CM

## 2020-07-23 ENCOUNTER — COMMUNICATION - HEALTHEAST (OUTPATIENT)
Dept: INTERNAL MEDICINE | Facility: CLINIC | Age: 65
End: 2020-07-23

## 2020-07-23 DIAGNOSIS — E11.9 TYPE 2 DIABETES MELLITUS WITHOUT COMPLICATION, WITHOUT LONG-TERM CURRENT USE OF INSULIN (H): ICD-10-CM

## 2020-08-04 ENCOUNTER — COMMUNICATION - HEALTHEAST (OUTPATIENT)
Dept: ANTICOAGULATION | Facility: CLINIC | Age: 65
End: 2020-08-04

## 2020-08-18 ENCOUNTER — COMMUNICATION - HEALTHEAST (OUTPATIENT)
Dept: INTERNAL MEDICINE | Facility: CLINIC | Age: 65
End: 2020-08-18

## 2020-08-18 DIAGNOSIS — G89.29 CHRONIC PAIN OF RIGHT KNEE: ICD-10-CM

## 2020-08-18 DIAGNOSIS — M25.561 CHRONIC PAIN OF RIGHT KNEE: ICD-10-CM

## 2020-09-01 ENCOUNTER — COMMUNICATION - HEALTHEAST (OUTPATIENT)
Dept: INTERNAL MEDICINE | Facility: CLINIC | Age: 65
End: 2020-09-01

## 2020-09-01 DIAGNOSIS — E11.9 TYPE 2 DIABETES MELLITUS WITHOUT COMPLICATION, WITH LONG-TERM CURRENT USE OF INSULIN (H): ICD-10-CM

## 2020-09-01 DIAGNOSIS — M62.838 MUSCLE SPASM: ICD-10-CM

## 2020-09-01 DIAGNOSIS — Z79.4 TYPE 2 DIABETES MELLITUS WITHOUT COMPLICATION, WITH LONG-TERM CURRENT USE OF INSULIN (H): ICD-10-CM

## 2020-09-09 ENCOUNTER — OFFICE VISIT - HEALTHEAST (OUTPATIENT)
Dept: INTERNAL MEDICINE | Facility: CLINIC | Age: 65
End: 2020-09-09

## 2020-09-09 ENCOUNTER — COMMUNICATION - HEALTHEAST (OUTPATIENT)
Dept: ANTICOAGULATION | Facility: CLINIC | Age: 65
End: 2020-09-09

## 2020-09-09 DIAGNOSIS — G89.29 CHRONIC PAIN OF RIGHT KNEE: ICD-10-CM

## 2020-09-09 DIAGNOSIS — I48.20 CHRONIC ATRIAL FIBRILLATION (H): ICD-10-CM

## 2020-09-09 DIAGNOSIS — M25.561 CHRONIC PAIN OF RIGHT KNEE: ICD-10-CM

## 2020-09-09 DIAGNOSIS — I10 ESSENTIAL HYPERTENSION: ICD-10-CM

## 2020-09-09 DIAGNOSIS — I48.91 A-FIB (H): ICD-10-CM

## 2020-09-09 DIAGNOSIS — F33.42 RECURRENT MAJOR DEPRESSIVE DISORDER, IN FULL REMISSION (H): ICD-10-CM

## 2020-09-09 DIAGNOSIS — E11.9 TYPE 2 DIABETES MELLITUS WITHOUT COMPLICATION, WITHOUT LONG-TERM CURRENT USE OF INSULIN (H): ICD-10-CM

## 2020-09-09 DIAGNOSIS — E78.2 MIXED HYPERLIPIDEMIA: ICD-10-CM

## 2020-09-09 DIAGNOSIS — E66.01 MORBID OBESITY (H): ICD-10-CM

## 2020-09-09 DIAGNOSIS — Z23 NEED FOR IMMUNIZATION AGAINST INFLUENZA: ICD-10-CM

## 2020-09-09 LAB
ALBUMIN SERPL-MCNC: 3.6 G/DL (ref 3.5–5)
ALP SERPL-CCNC: 111 U/L (ref 45–120)
ALT SERPL W P-5'-P-CCNC: 33 U/L (ref 0–45)
ANION GAP SERPL CALCULATED.3IONS-SCNC: 14 MMOL/L (ref 5–18)
AST SERPL W P-5'-P-CCNC: 22 U/L (ref 0–40)
BILIRUB DIRECT SERPL-MCNC: 0.2 MG/DL
BILIRUB SERPL-MCNC: 0.3 MG/DL (ref 0–1)
BUN SERPL-MCNC: 12 MG/DL (ref 8–22)
CALCIUM SERPL-MCNC: 9.3 MG/DL (ref 8.5–10.5)
CHLORIDE BLD-SCNC: 106 MMOL/L (ref 98–107)
CHOLEST SERPL-MCNC: 99 MG/DL
CO2 SERPL-SCNC: 18 MMOL/L (ref 22–31)
CREAT SERPL-MCNC: 0.78 MG/DL (ref 0.7–1.3)
DIGOXIN LEVEL LHE- HISTORICAL: 0.4 NG/ML (ref 0.5–2)
ERYTHROCYTE [DISTWIDTH] IN BLOOD BY AUTOMATED COUNT: 13.6 % (ref 11–14.5)
FASTING STATUS PATIENT QL REPORTED: YES
GFR SERPL CREATININE-BSD FRML MDRD: >60 ML/MIN/1.73M2
GLUCOSE BLD-MCNC: 62 MG/DL (ref 70–125)
HBA1C MFR BLD: 6.7 %
HCT VFR BLD AUTO: 44.5 % (ref 40–54)
HDLC SERPL-MCNC: 33 MG/DL
HGB BLD-MCNC: 14.8 G/DL (ref 14–18)
INR PPP: 2.6 (ref 0.9–1.1)
LDLC SERPL CALC-MCNC: 43 MG/DL
MCH RBC QN AUTO: 29.8 PG (ref 27–34)
MCHC RBC AUTO-ENTMCNC: 33.2 G/DL (ref 32–36)
MCV RBC AUTO: 90 FL (ref 80–100)
PLATELET # BLD AUTO: 359 THOU/UL (ref 140–440)
PMV BLD AUTO: 6.4 FL (ref 7–10)
POTASSIUM BLD-SCNC: 3.4 MMOL/L (ref 3.5–5)
PROT SERPL-MCNC: 7.5 G/DL (ref 6–8)
RBC # BLD AUTO: 4.96 MILL/UL (ref 4.4–6.2)
SODIUM SERPL-SCNC: 138 MMOL/L (ref 136–145)
TRIGL SERPL-MCNC: 115 MG/DL
TSH SERPL DL<=0.005 MIU/L-ACNC: 1.06 UIU/ML (ref 0.3–5)
WBC: 9.6 THOU/UL (ref 4–11)

## 2020-09-10 ENCOUNTER — COMMUNICATION - HEALTHEAST (OUTPATIENT)
Dept: INTERNAL MEDICINE | Facility: CLINIC | Age: 65
End: 2020-09-10

## 2020-10-05 ENCOUNTER — COMMUNICATION - HEALTHEAST (OUTPATIENT)
Dept: INTERNAL MEDICINE | Facility: CLINIC | Age: 65
End: 2020-10-05

## 2020-10-05 DIAGNOSIS — M25.561 CHRONIC PAIN OF RIGHT KNEE: ICD-10-CM

## 2020-10-05 DIAGNOSIS — G89.29 CHRONIC PAIN OF RIGHT KNEE: ICD-10-CM

## 2020-10-19 ENCOUNTER — COMMUNICATION - HEALTHEAST (OUTPATIENT)
Dept: ANTICOAGULATION | Facility: CLINIC | Age: 65
End: 2020-10-19

## 2020-10-24 ENCOUNTER — COMMUNICATION - HEALTHEAST (OUTPATIENT)
Dept: INTERNAL MEDICINE | Facility: CLINIC | Age: 65
End: 2020-10-24

## 2020-10-24 DIAGNOSIS — E11.9 TYPE 2 DIABETES MELLITUS WITHOUT COMPLICATION, WITHOUT LONG-TERM CURRENT USE OF INSULIN (H): ICD-10-CM

## 2020-10-24 DIAGNOSIS — E78.2 MIXED HYPERLIPIDEMIA: ICD-10-CM

## 2020-10-27 ENCOUNTER — COMMUNICATION - HEALTHEAST (OUTPATIENT)
Dept: INTERNAL MEDICINE | Facility: CLINIC | Age: 65
End: 2020-10-27

## 2020-10-27 DIAGNOSIS — M62.838 MUSCLE SPASM: ICD-10-CM

## 2020-11-07 ENCOUNTER — HEALTH MAINTENANCE LETTER (OUTPATIENT)
Age: 65
End: 2020-11-07

## 2020-11-29 ENCOUNTER — RECORDS - HEALTHEAST (OUTPATIENT)
Dept: ADMINISTRATIVE | Facility: OTHER | Age: 65
End: 2020-11-29

## 2020-12-17 ENCOUNTER — COMMUNICATION - HEALTHEAST (OUTPATIENT)
Dept: ANTICOAGULATION | Facility: CLINIC | Age: 65
End: 2020-12-17

## 2020-12-17 DIAGNOSIS — Z79.01 LONG TERM (CURRENT) USE OF ANTICOAGULANTS: ICD-10-CM

## 2020-12-17 DIAGNOSIS — I48.20 CHRONIC ATRIAL FIBRILLATION (H): ICD-10-CM

## 2020-12-28 ENCOUNTER — COMMUNICATION - HEALTHEAST (OUTPATIENT)
Dept: INTERNAL MEDICINE | Facility: CLINIC | Age: 65
End: 2020-12-28

## 2020-12-28 DIAGNOSIS — M62.838 MUSCLE SPASM: ICD-10-CM

## 2021-01-06 ENCOUNTER — COMMUNICATION - HEALTHEAST (OUTPATIENT)
Dept: INTERNAL MEDICINE | Facility: CLINIC | Age: 66
End: 2021-01-06

## 2021-01-06 ENCOUNTER — COMMUNICATION - HEALTHEAST (OUTPATIENT)
Dept: ANTICOAGULATION | Facility: CLINIC | Age: 66
End: 2021-01-06

## 2021-01-06 ENCOUNTER — RECORDS - HEALTHEAST (OUTPATIENT)
Dept: ANTICOAGULATION | Facility: CLINIC | Age: 66
End: 2021-01-06

## 2021-02-21 ENCOUNTER — COMMUNICATION - HEALTHEAST (OUTPATIENT)
Dept: INTERNAL MEDICINE | Facility: CLINIC | Age: 66
End: 2021-02-21

## 2021-02-21 DIAGNOSIS — R60.9 EDEMA, UNSPECIFIED TYPE: ICD-10-CM

## 2021-02-26 ENCOUNTER — COMMUNICATION - HEALTHEAST (OUTPATIENT)
Dept: ANTICOAGULATION | Facility: CLINIC | Age: 66
End: 2021-02-26

## 2021-02-26 DIAGNOSIS — I48.20 CHRONIC ATRIAL FIBRILLATION (H): ICD-10-CM

## 2021-03-27 ENCOUNTER — HEALTH MAINTENANCE LETTER (OUTPATIENT)
Age: 66
End: 2021-03-27

## 2021-05-16 ENCOUNTER — HEALTH MAINTENANCE LETTER (OUTPATIENT)
Age: 66
End: 2021-05-16

## 2021-05-26 ASSESSMENT — PATIENT HEALTH QUESTIONNAIRE - PHQ9: SUM OF ALL RESPONSES TO PHQ QUESTIONS 1-9: 9

## 2021-05-27 ENCOUNTER — RECORDS - HEALTHEAST (OUTPATIENT)
Dept: ADMINISTRATIVE | Facility: CLINIC | Age: 66
End: 2021-05-27

## 2021-05-27 VITALS — HEART RATE: 80 BPM | DIASTOLIC BLOOD PRESSURE: 86 MMHG | SYSTOLIC BLOOD PRESSURE: 114 MMHG

## 2021-05-27 ASSESSMENT — PATIENT HEALTH QUESTIONNAIRE - PHQ9: SUM OF ALL RESPONSES TO PHQ QUESTIONS 1-9: 2

## 2021-05-30 NOTE — PATIENT INSTRUCTIONS - HE
Obi Messer,    It was a pleasure to see you today at the [facility].     My recommendations after this visit include:    Blood test to check for clots  I would not recommend the angiogram or stress test at this point  Blood tests indicate that she did not have active heart failure at present time      FUENTES Caldera MD, FACC, [facility]

## 2021-05-30 NOTE — TELEPHONE ENCOUNTER
Left message to call back for: Obi  Information to relay to patient:  Please relay message below from Dr. Lechuga.  Let the patient know that we have sent the referral to [facility].  Thank you.  Samantha ANDUJAR CMA/DEVENDRA....................11:21 AM

## 2021-05-30 NOTE — TELEPHONE ENCOUNTER
Patient Returning Call  Reason for call:  Patient called back.  Information relayed to patient:  Informed of Dr Lechuga's message listed below.  Patient states understanding and agrees with plan.  Patient has additional questions:  No  If YES, what are your questions/concerns:    Okay to leave a detailed message?: No call back needed

## 2021-05-30 NOTE — TELEPHONE ENCOUNTER
Dr. Lechuga,  Patient is wanting to know if you have any recommendation as far as a nephrologist.    Please advise.  Thank you.  Samantha ANDUJAR CMA/DEVENDRA....................10:26 AM

## 2021-05-30 NOTE — TELEPHONE ENCOUNTER
Who is calling:  Patient   Reason for Call:  Patient is calling about his referral for the nephrology and wondering where he can go or if the doctor recommends anyone to go to.   Date of last appointment with primary care: 06/25/19  Okay to leave a detailed message: Yes

## 2021-05-30 NOTE — TELEPHONE ENCOUNTER
Patient Returning Call  Reason for call:  Referral   Information relayed to patient:    Enio Lechuga, MDPhysicianSigned  10:43 AM                Dr. Olivia Quintero of [facility].                   Patient has additional questions:  No  If YES, what are your questions/concerns:  NONE  Okay to leave a detailed message?: No call back needed

## 2021-05-30 NOTE — PROGRESS NOTES
Office Visit - New Patient   Obi Messer   64 y.o.  male    Date of visit: 6/25/2019  Physician: Enio Lechuga MD     Assessment and Plan   1. Chronic combined systolic and diastolic congestive heart failure (H)  Has chronic combined systolic and diastolic heart failure due to his atrial fibrillation, hypertension, coronary artery disease.  Complains of shortness of breath with ordinary activities like walking a block or less.  Also has some increased of leg edema with some redness on the  distal third of the right leg.  His weight is also up.  All these suggest he has chronic heart failure.  Advised to take her furosemide 20 mg daily together with spironolactone 20 mg daily and rest of his cardiac medications.  Has been taking furosemide only as needed.  Check BMP.  Check digoxin level.  Referred to cardiology via the rapid access clinic.  - BNP(B-type Natriuretic Peptide)  - Ambulatory referral to [facility]  - Digoxin (Lanoxin )    2. Coronary artery disease due to lipid rich plaque, s/p stent LAD and OM, 2013  Has coronary artery disease status post stent of the LAD and OM in 2013.  Denies chest pains but gets short of breath easily.  Shortness of breath is part of his congestive heart failure symptoms but could be an angina equivalent.  To see cardiology.    3. Type 2 diabetes mellitus without complication, without long-term current use of insulin (H)  Has diabetes.  Controlled by metformin alone.  Last A1c was good at 7.3 on 2/7/2019 done at his previous clinic.  Fasting today.  Check A1c, basic metabolic panel liver function.  - Glycosylated Hemoglobin A1c  - Basic Metabolic Panel  - Hepatic Profile    4. Essential hypertension  Controlled.  Continue metoprolol, lisinopril together with his diuretic furosemide and spironolactone.    5. Mixed hyperlipidemia  Controlled at least on his last lipid check in 8/2018, showing LDL 63 HDL 31, triglycerides 118 and total cholesterol 118.  Continue  atorvastatin.  Fasting today.  Check fasting lipids and TSH.  - Lipid Cascade  - Thyroid Stimulating Hormone (TSH)    6. Recurrent major depressive disorder, in full remission (H)  Feels his major depression is in remission but can flare at times. Most of the time his depression is controlled.  Takes Cymbalta and quetiapine.  For now continue same medications.    7. Venous stasis dermatitis of right lower extremity  Has venous stasis of the right lower extremity due to his edema causing stasis dermatitis on  the distal third of his right leg with possible secondary infection.  Will treat with cephalexin 500 mg 4 times a day for 10 days.  Has constant pains on this right leg and wants to take something stronger pain medication for it.  Will give hydrocodone with acetaminophen 1 tablet twice a day as needed.  Will refer to lymphedema clinic for edema care.  - HM2(CBC w/o Differential)  - cephalexin (KEFLEX) 500 MG capsule; Take 1 capsule (500 mg total) by mouth 4 (four) times a day for 10 days.  Dispense: 40 capsule; Refill: 0  - Ambulatory referral to Lymphedema Care  - HYDROcodone-acetaminophen 5-325 mg per tablet; Take 1 tablet by mouth 2 (two) times a day.  Dispense: 30 tablet; Refill: 0    8.  Chronic atrial fibrillation  Continue Xarelto.  Rate controlled by metoprolol.    Reviewed his previous primary MD notes, cardiology notes, treatment and recommendations.  Reviewed all his medications and updated his medication list.    FOLLOWUP in 2 weeks.     Chief Complaint   Establish Care (Faisal Referred patient to you. He is concerned about new dx of CHF ) and Leg Swelling (R foot/ankle swelling, and increased redness)       Patient Profile   Social History     Social History Narrative     in May, 2018. 3 grown children, 2 sons and 1 daughter. 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 Nonsmoker. Socially drinks alcohol 2 times a month.         Past Medical History   Patient Active Problem List   Diagnosis      Rupture of right quadriceps muscle, subsequent encounter     Chronic combined systolic and diastolic congestive heart failure (H)     Coronary artery disease due to lipid rich plaque, s/p stent LAD and OM, 2013     Type 2 diabetes mellitus without complication, without long-term current use of insulin (H)     Essential hypertension     Mixed hyperlipidemia       Past Surgical History  He has a past surgical history that includes Cholecystectomy; LEFT TOTAL KNEE ARTHROPLASTY  (2016); Tonsilectomy, adenoidectomy, bilateral myringotomy and tubes (AGE 11); CERVICAL 5 (2004); Replacement total knee (Right); and Quadriceps tendon repair (Right, 12/5/2017).     History of Present Illness   This 64 y.o. old male new to our clinic once establishes care with me.  Has chronic heart failure due to his atrial fibrillation,, coronary artery disease and hypertension.  Gets short of breath with ordinary activities walking less than a block with leg edema, orthopnea and easy fatigability.  Reports he is quite indiscrete with his diet especially salty diet.  Followed by cardiology at [facility] and was last seen in February 2019.  Noted he has increased leg edema with some redness in the distal third of the right leg and quite painful.  Also gets tired easily but able to do his activities of daily living.  Has chronic atrial fibrillation.  Rate controlled.  Takes Xarelto indefinitely.  Has coronary artery disease 2013 after he had a drug-eluting stent placed  on his left anterior descending and obtuse marginal branch.  Denies chest pains but has shortness of breath.  Has diabetes controlled by metformin alone.  Has hyperlipidemia controlled by atorvastatin.  Obese based on his BMI.  Does not have other complaints.    Review of Systems   A 12 point comprehensive review of systems was negative except as noted.         Medications and Allergies   Current Outpatient Medications   Medication Sig     atorvastatin (LIPITOR) 40 MG  tablet Take 40 mg by mouth at bedtime.     digoxin (LANOXIN) 250 mcg tablet Take 250 mcg by mouth daily.      DULoxetine (CYMBALTA) 60 MG capsule Take 180 mg by mouth daily.            furosemide (LASIX) 40 MG tablet Take 40 mg by mouth 2 (two) times a day as needed (swelling).      ibuprofen (ADVIL,MOTRIN) 600 MG tablet Take 600 mg by mouth every 6 (six) hours as needed for pain.     lisinopril (PRINIVIL,ZESTRIL) 20 MG tablet Take 20 mg by mouth daily.     LORazepam (ATIVAN) 0.5 MG tablet Take 0.5 mg by mouth 2 (two) times a day as needed for anxiety.      metFORMIN (GLUCOPHAGE) 1000 MG tablet Take 2,000 mg by mouth daily with breakfast.            metoprolol tartrate (LOPRESSOR) 100 MG tablet Take 100 mg by mouth 2 (two) times a day.     polyvinyl alcohol (LIQUIFILM TEARS) 1.4 % ophthalmic solution Administer 1 drop to both eyes as needed for dry eyes.     QUEtiapine (SEROQUEL) 100 MG tablet Take 100 mg by mouth at bedtime.     QUEtiapine (SEROQUEL) 25 MG tablet Take 25 mg by mouth 3 (three) times a day. Morning, afternoon and evening     rivaroxaban 20 mg Tab Take 20 mg by mouth daily with supper.      senna-docusate (SENNOSIDES-DOCUSATE SODIUM) 8.6-50 mg tablet Take 1 tablet by mouth 2 (two) times a day as needed for constipation.      spironolactone (ALDACTONE) 25 MG tablet Take 25 mg by mouth daily.     VICTOZA 3-AUBREY 0.6 mg/0.1 mL (18 mg/3 mL) PnIj injection INJECT 1.8MG SC QD     zolpidem (AMBIEN) 10 mg tablet Take 10 mg by mouth at bedtime as needed for sleep.     cephalexin (KEFLEX) 500 MG capsule Take 1 capsule (500 mg total) by mouth 4 (four) times a day for 10 days.     HYDROcodone-acetaminophen 5-325 mg per tablet Take 1 tablet by mouth 2 (two) times a day.     No Known Allergies     Family and Social History   Family History   Problem Relation Age of Onset     Heart disease Father         Social History     Tobacco Use     Smoking status: Former Smoker     Last attempt to quit: 1/19/2014     Years  since quittin.4     Smokeless tobacco: Current User   Substance Use Topics     Alcohol use: No     Drug use: No        Physical Exam   Physical Exam  General appearance: alert, appears stated age, cooperative, no distress and morbidly obese  Head: Normocephalic, without obvious abnormality, atraumatic  Throat: lips, mucosa, and tongue normal; teeth and gums normal  Neck: no adenopathy, no carotid bruit, no JVD, supple, symmetrical, trachea midline and thyroid not enlarged, symmetric, no tenderness/mass/nodules  Lungs: clear to auscultation bilaterally  Heart: irregularly irregular rhythm, S1, S2 normal, no click, no rub and no murmur  Abdomen: soft, non-tender; bowel sounds normal; no masses,  no organomegaly  Extremities: edema 2 plus bilateral legs with venous stasis dermatitis and secondary infection, distal third of the right leg  Skin: Skin color, texture, turgor normal. No rashes or lesions  Neurologic: Grossly normal     Additional Information     Time: total time spent with the patient was 45 minutes of which >50% was spent in counseling and coordination of care     Enio Lechuga MD  Internal Medicine  Contact me at [phone]

## 2021-05-30 NOTE — TELEPHONE ENCOUNTER
Dr. Lechuga,     Spoke with patient regarding lab work from 6/25. Patient stated he was aware of results on Mychart. He is questioning seeing diabetic educator as he is tight on money right now and is aware his diet is not well controlled and hasn't been the best due to having a lot of teeth missing and waiting to get into the dentist to get partials. He would prefer to work on his diet and re-evaluate A1C and glucose at next visit. He was also wondering if he could adjust his metformin, to taking more to see if that would affect his diabetic numbers. Currently he is taking Metformin 1000mg, 2 tablets in the AM. He is willing to see the kidney Md for his abnormal kidney fuction. Order has been placed.     Veronika Guerrero LPN

## 2021-05-30 NOTE — TELEPHONE ENCOUNTER
Metformin dose he is taking is the maximum dose. Also he has abnormal kidney function so his kidneys won't be able to handle a much higher dose.

## 2021-05-30 NOTE — PROGRESS NOTES
Office Visit - Follow Up   Obi Messer   64 y.o. male    Date of Visit: 7/9/2019    Chief Complaint   Patient presents with     Follow-up     getting light headed and having to catch him self from a fall     Immunizations     prevnar 13        Assessment and Plan   1. Chronic combined systolic and diastolic congestive heart failure (H)  Now compensated.  But showing low  blood pressure in the 90s to 100 systolic.  Will decrease furosemide to 40 mg daily.  Continue rest of his other cardiac medications.    2. Venous stasis dermatitis of right lower extremity  Now improved with improvement of lower extremity edema with furosemide and spironolactone.  Has residual stasis dermatitis.  Will treat with triamcinolone.  Secondary infection is now resolved by cephalexin.  Finishing her cephalexin today.  - HYDROcodone-acetaminophen 5-325 mg per tablet; Take 1 tablet by mouth 2 (two) times a day.  Dispense: 30 tablet; Refill: 0  - triamcinolone (KENALOG) 0.1 % cream; Apply topically 3 (three) times a day.  Dispense: 60 g; Refill: 1    3. Chronic atrial fibrillation (H)  Has chronic atrial fibrillation.  Rate controlled.  On Xarelto.    4. Essential hypertension  Controlled.  Continue same medications, lisinopril and metoprolol.    5. Type 2 diabetes mellitus without complication, without long-term current use of insulin (H)  Not ideally controlled.  A1c was 8.8 on  his first visit with me.  For now continue same  metformin.    6. Mixed hyperlipidemia  Controlled.  Lipids on his last visit were good, LDL 45, HDL 38, triglycerides 108 and total cholesterol 105.  Continue atorvastatin.    7. CKD (chronic kidney disease) stage 3, GFR 30-59 ml/min (H)  Has CKD stage III due to his long-standing diabetes.  Scheduled to see renal next week.    8. Lymphedema of both lower extremities  Has concomitant lymphedema of both lower extremities aside from edema from his heart failure.  Will refer him to lymphedema clinic.  - Ambulatory  referral to Lymphedema Care    Reviewed cardiology notes of Dr. Caldera and his recommendations.  Did a d-dimer to rule out chronic pulmonary embolism which was normal.  Was advised to continue same cardiac meds.  Was recommended to see a lymphedema clinic.      Follow up in 4 weeks.     History of Present Illness   This 64 y.o. old male is here for follow-up.  This is his second visit with me.  Has combined diastolic and systolic heart failure but now compensated.  BNP taken on his first visit was not normal.  Has chronic leg swelling due to edema and lymphedema.  Increased leg swelling responded to increase of furosemide and continuing with spironolactone.  But reports of low systolic blood pressure reading may be due to hypovolemia.  Feels dizzy at times.  But no falls.  Has chronic atrial fibrillation, on Xarelto.  Has venous stasis dermatitis to his leg swelling.  Secondary infection of the venous stasis dermatitis found with cephalexin which he is finishing today.  Has diabetes not ideally controlled.  Only takes metformin.  Has hyperlipidemia well controlled by atorvastatin.  Found to have chronic kidney disease  stage 3 due to his long-standing diabetes.  Will see nephrology next week.  Overall, feels well.  No complaints.    Review of Systems   A 12 point comprehensive review of systems was negative except as noted..     Medications, Allergies and Problem List   Reviewed and updated             Chief Complaint   Follow-up (getting light headed and having to catch him self from a fall) and Immunizations (prevnar 13)       Patient Profile   Social History     Social History Narrative     in May, 2018. 3 grown children, 2 sons and 1 daughter. 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 Nonsmoker. Socially drinks alcohol 2 times a month.         Past Medical History   Patient Active Problem List   Diagnosis     Rupture of right quadriceps muscle, subsequent encounter     Chronic combined systolic and  diastolic congestive heart failure (H)     Coronary artery disease due to lipid rich plaque, s/p stent LAD and OM, 2013     Type 2 diabetes mellitus without complication, without long-term current use of insulin (H)     Essential hypertension     Mixed hyperlipidemia     Venous stasis dermatitis of right lower extremity     Recurrent major depressive disorder, in full remission (H)     Chronic atrial fibrillation (H)       Past Surgical History  He has a past surgical history that includes Cholecystectomy; LEFT TOTAL KNEE ARTHROPLASTY  (2016); Tonsilectomy, adenoidectomy, bilateral myringotomy and tubes (AGE 11); CERVICAL 5 (2004); Replacement total knee (Right); and Quadriceps tendon repair (Right, 12/5/2017).       Medications and Allergies   Current Outpatient Medications   Medication Sig     atorvastatin (LIPITOR) 40 MG tablet Take 40 mg by mouth at bedtime.     CARTIA  mg 24 hr capsule 120 mg daily.            digoxin (LANOXIN) 250 mcg tablet Take 250 mcg by mouth daily.      DULoxetine (CYMBALTA) 60 MG capsule Take 180 mg by mouth daily.            furosemide (LASIX) 40 MG tablet Take 40 mg by mouth 2 (two) times a day as needed (swelling).      HYDROcodone-acetaminophen 5-325 mg per tablet Take 1 tablet by mouth 2 (two) times a day.     ibuprofen (ADVIL,MOTRIN) 600 MG tablet Take 600 mg by mouth every 6 (six) hours as needed for pain.     lisinopril (PRINIVIL,ZESTRIL) 20 MG tablet Take 20 mg by mouth daily.     LORazepam (ATIVAN) 0.5 MG tablet Take 0.5 mg by mouth 2 (two) times a day as needed for anxiety.      metFORMIN (GLUCOPHAGE) 1000 MG tablet Take 2,000 mg by mouth daily with breakfast.            metoprolol tartrate (LOPRESSOR) 100 MG tablet Take 100 mg by mouth 2 (two) times a day.     polyvinyl alcohol (LIQUIFILM TEARS) 1.4 % ophthalmic solution Administer 1 drop to both eyes as needed for dry eyes.     QUEtiapine (SEROQUEL) 100 MG tablet Take 100 mg by mouth at bedtime.     QUEtiapine  "(SEROQUEL) 25 MG tablet Take 25 mg by mouth 3 (three) times a day. Morning, afternoon and evening     rivaroxaban 20 mg Tab Take 20 mg by mouth daily with supper.      senna-docusate (SENNOSIDES-DOCUSATE SODIUM) 8.6-50 mg tablet Take 1 tablet by mouth 2 (two) times a day as needed for constipation.      spironolactone (ALDACTONE) 25 MG tablet Take 25 mg by mouth daily.     VICTOZA 3-AUBREY 0.6 mg/0.1 mL (18 mg/3 mL) PnIj injection INJECT 1.8MG SC QD     zolpidem (AMBIEN) 10 mg tablet Take 10 mg by mouth at bedtime as needed for sleep.     triamcinolone (KENALOG) 0.1 % cream Apply topically 3 (three) times a day.     No Known Allergies     Family and Social History   Family History   Problem Relation Age of Onset     Heart disease Father         Social History     Tobacco Use     Smoking status: Former Smoker     Last attempt to quit: 2014     Years since quittin.4     Smokeless tobacco: Current User   Substance Use Topics     Alcohol use: No     Drug use: No        Physical Exam       Physical Exam  /60   Pulse 73   Ht 5' 8\" (1.727 m)   Wt (!) 292 lb (132.5 kg)   SpO2 97%   BMI 44.40 kg/m    General appearance: alert, appears stated age, cooperative, no distress and morbidly obese  Head: Normocephalic, without obvious abnormality, atraumatic  Throat: lips, mucosa, and tongue normal; teeth and gums normal  Neck: no adenopathy, no carotid bruit, no JVD, supple, symmetrical, trachea midline and thyroid not enlarged, symmetric, no tenderness/mass/nodules  Lungs: clear to auscultation bilaterally  Heart: irregularly irregular rhythm  Abdomen: soft, non-tender; bowel sounds normal; no masses,  no organomegaly  Extremities: edema markedly reduced or improved, some residual venous stasis dermatitis but no infection  Skin: Skin color, texture, turgor normal. No rashes or lesions  Neurologic: Grossly normal     Additional Information        Enio Lechuga MD  Internal Medicine  Contact me at [phone] "     Additional Information   Current Outpatient Medications   Medication Sig     atorvastatin (LIPITOR) 40 MG tablet Take 40 mg by mouth at bedtime.     CARTIA  mg 24 hr capsule 120 mg daily.            digoxin (LANOXIN) 250 mcg tablet Take 250 mcg by mouth daily.      DULoxetine (CYMBALTA) 60 MG capsule Take 180 mg by mouth daily.            furosemide (LASIX) 40 MG tablet Take 40 mg by mouth 2 (two) times a day as needed (swelling).      HYDROcodone-acetaminophen 5-325 mg per tablet Take 1 tablet by mouth 2 (two) times a day.     ibuprofen (ADVIL,MOTRIN) 600 MG tablet Take 600 mg by mouth every 6 (six) hours as needed for pain.     lisinopril (PRINIVIL,ZESTRIL) 20 MG tablet Take 20 mg by mouth daily.     LORazepam (ATIVAN) 0.5 MG tablet Take 0.5 mg by mouth 2 (two) times a day as needed for anxiety.      metFORMIN (GLUCOPHAGE) 1000 MG tablet Take 2,000 mg by mouth daily with breakfast.            metoprolol tartrate (LOPRESSOR) 100 MG tablet Take 100 mg by mouth 2 (two) times a day.     polyvinyl alcohol (LIQUIFILM TEARS) 1.4 % ophthalmic solution Administer 1 drop to both eyes as needed for dry eyes.     QUEtiapine (SEROQUEL) 100 MG tablet Take 100 mg by mouth at bedtime.     QUEtiapine (SEROQUEL) 25 MG tablet Take 25 mg by mouth 3 (three) times a day. Morning, afternoon and evening     rivaroxaban 20 mg Tab Take 20 mg by mouth daily with supper.      senna-docusate (SENNOSIDES-DOCUSATE SODIUM) 8.6-50 mg tablet Take 1 tablet by mouth 2 (two) times a day as needed for constipation.      spironolactone (ALDACTONE) 25 MG tablet Take 25 mg by mouth daily.     VICTOZA 3-AUBREY 0.6 mg/0.1 mL (18 mg/3 mL) PnIj injection INJECT 1.8MG SC QD     zolpidem (AMBIEN) 10 mg tablet Take 10 mg by mouth at bedtime as needed for sleep.     triamcinolone (KENALOG) 0.1 % cream Apply topically 3 (three) times a day.     No Known Allergies  Social History     Tobacco Use     Smoking status: Former Smoker     Last attempt to quit:  2014     Years since quittin.4     Smokeless tobacco: Current User   Substance Use Topics     Alcohol use: No     Drug use: No         Time: total time spent with the patient was 40 minutes of which >50% was spent in counseling and coordination of care

## 2021-05-30 NOTE — TELEPHONE ENCOUNTER
RN cannot approve Refill Request    RN can NOT refill this medication historical medication requested. Last office visit: 7/9/2019 Enio Lechuga MD Last Physical: Visit date not found Last MTM visit: Visit date not found Last visit same specialty: 7/9/2019 Enio Lechuga MD.  Next visit within 3 mo: Visit date not found  Next physical within 3 mo: Visit date not found      Kathryn Seymour, [facility] Triage/Med Refill 7/12/2019    Requested Prescriptions   Pending Prescriptions Disp Refills     furosemide (LASIX) 40 MG tablet  0     Sig: Take 1 tablet (40 mg total) by mouth 2 (two) times a day as needed (swelling).       Diuretics/Combination Diuretics Refill Protocol  Passed - 7/12/2019  9:44 AM        Passed - Visit with PCP or prescribing provider visit in past 12 months     Last office visit with prescriber/PCP: 7/9/2019 Enio Lechuga MD OR same dept: 7/9/2019 Enio Lechuga MD OR same specialty: 7/9/2019 Enio Lechuga MD  Last physical: Visit date not found Last MTM visit: Visit date not found   Next visit within 3 mo: Visit date not found  Next physical within 3 mo: Visit date not found  Prescriber OR PCP: Enio Lechuga MD  Last diagnosis associated with med order: There are no diagnoses linked to this encounter.  If protocol passes may refill for 12 months if within 3 months of last provider visit (or a total of 15 months).             Passed - Serum Potassium in past 12 months      Lab Results   Component Value Date    Potassium 4.4 06/25/2019             Passed - Serum Sodium in past 12 months      Lab Results   Component Value Date    Sodium 132 (L) 06/25/2019             Passed - Blood pressure on file in past 12 months     BP Readings from Last 1 Encounters:   07/09/19 100/60             Passed - Serum Creatinine in past 12 months      Creatinine   Date Value Ref Range Status   06/25/2019 1.51 (H) 0.70 - 1.30 mg/dL Final

## 2021-05-30 NOTE — TELEPHONE ENCOUNTER
Who is calling:  Patient     Reason for Call: Calling for refill request . Patient was recently seen . Please expedite as patient is out of medication .     Date of last appointment with primary care:   07/09/19    Okay to leave a detailed message: Yes

## 2021-05-30 NOTE — TELEPHONE ENCOUNTER
Left message to call back for: Obi  Information to relay to patient:  Please relay message below from Dr. Lechuga.  Thank you.  Samantha ANDUJAR, ANAMIKA/CMT....................1:42 PM

## 2021-05-31 VITALS — BODY MASS INDEX: 40.49 KG/M2 | HEIGHT: 68 IN | WEIGHT: 267.13 LBS

## 2021-05-31 VITALS — HEIGHT: 68 IN | WEIGHT: 254.38 LBS | BODY MASS INDEX: 38.55 KG/M2

## 2021-05-31 NOTE — PROGRESS NOTES
MTM Consult Encounter    Obi Messer is a 64 y.o. male referred for a clinical pharmacist consult from Dr. Lechuga for diabetes education.  He mentions that in June was the first time he went to Medicare, he is now having a hard time getting coverage of some of his medications he was previously stable on.  This includes Xarelto and Cartia.      Discussion:   Type 2 diabetes: Of note he has had little benefit over a longer period of time on max dose Victoza.  At first he had more of an appetite suppressant effect, however this is weaned.  His blood sugars vary widely, no over the last few months he had significant dental work completed, and this is largely affecting his diet.  He teeth are starting to heal, as a result he is trying to eat better.  On Sunday he had a fasting blood sugar of 157, however his blood sugars range from 140s to 300s fasting.  Of note he gained about 40 pounds since January 2018.  He would like to learn about alternative regimens similar to Victoza to help with his blood sugars and weight loss.  Based on his current insurance it is difficult to assess whether which other GLP-1 agonist is covered for him, and if this will be cheaper, provided a sample coupon provided education on how to utilize Trulicity at max dose, he demonstrates understanding and will initiate this medication once his Victoza is gone.  Lab Results   Component Value Date    HGBA1C 8.8 (H) 06/25/2019    HGBA1C 7.9 (H) 06/22/2016     Lab Results   Component Value Date    LDLCALC 45 06/25/2019    CREATININE 1.51 (H) 06/25/2019     Plan:  1.  Switch to Victoza 1.8 mg daily to Trulicity 1.5 mg weekly  2.  Diabetes education referral for professional CGM and dietary education    Follow up:   1 month to reassess efficacy and safety of Trulicity    Carlo Jang, PharmD, BCACP  Medication Management (MTM) Pharmacist  [facility] & [facility]      Current Outpatient Medications   Medication Sig Dispense Refill      ACCU-CHEK ALIYAH PLUS METER [facility] UTD  12     atorvastatin (LIPITOR) 40 MG tablet Take 40 mg by mouth at bedtime.       blood glucose test strips Use 1 each As Directed daily. Dispense brand per patient's insurance at pharmacy discretion. 100 strip 3     CARTIA  mg 24 hr capsule Take 120 mg by mouth daily.             digoxin (LANOXIN) 250 mcg tablet Take 250 mcg by mouth daily.        doxepin (SINEQUAN) 10 MG capsule Take 1 capsule by mouth as needed.  1     dulaglutide (TRULICITY) 1.5 mg/0.5 mL PnIj Inject 1.5 mg under the skin every 7 days. 2 mL 0     DULoxetine (CYMBALTA) 30 MG capsule Take 30 mg by mouth daily. ALONG WITH 60MG DOSE, TOTAL 90MG DAILY              DULoxetine (CYMBALTA) 60 MG capsule Take 1 capsule by mouth daily. ALONG WITH THE 30MG FOR A TOTAL DAILY DOSE OF 90MG  1     furosemide (LASIX) 40 MG tablet Take 1 tablet (40 mg total) by mouth daily as needed (swelling). 20 tablet 0     HYDROcodone-acetaminophen 5-325 mg per tablet Take 1 tablet by mouth 2 (two) times a day as needed for pain. 30 tablet 0     ibuprofen (ADVIL,MOTRIN) 600 MG tablet Take 200-400 mg by mouth 3 (three) times a day as needed for pain.             lisinopril (PRINIVIL,ZESTRIL) 40 MG tablet Take 1 tablet by mouth daily.             LORazepam (ATIVAN) 0.5 MG tablet Take 0.5 mg by mouth 2 (two) times a day as needed for anxiety.        metFORMIN (GLUMETZA) 1000 MG (MOD) 24 hr tablet Take 2,000 mg by mouth daily with breakfast.       metoprolol tartrate (LOPRESSOR) 100 MG tablet Take 100 mg by mouth 2 (two) times a day.       polyvinyl alcohol (LIQUIFILM TEARS) 1.4 % ophthalmic solution Administer 1 drop to both eyes as needed for dry eyes.       QUEtiapine (SEROQUEL) 100 MG tablet Take 100 mg by mouth at bedtime.       QUEtiapine (SEROQUEL) 25 MG tablet Take 25 mg by mouth 3 (three) times a day. Morning, afternoon and evening       rivaroxaban 20 mg Tab Take 20 mg by mouth daily with supper.        senna-docusate  (SENNOSIDES-DOCUSATE SODIUM) 8.6-50 mg tablet Take 1 tablet by mouth 2 (two) times a day as needed for constipation.        spironolactone (ALDACTONE) 25 MG tablet Take 25 mg by mouth daily.       tiZANidine (ZANAFLEX) 4 MG tablet Take 1 tablet (4 mg total) by mouth 2 (two) times a day as needed. 30 tablet 1     triamcinolone (KENALOG) 0.1 % cream Apply topically 3 (three) times a day. 60 g 1     zolpidem (AMBIEN) 10 mg tablet Take 10 mg by mouth at bedtime as needed for sleep.       No current facility-administered medications for this visit.

## 2021-05-31 NOTE — PATIENT INSTRUCTIONS - HE
Recommendations from today's MTM visit:                                                    MTM (medication therapy management) is a service provided by a clinical pharmacist designed to help you get the most of out of your medicines. and Today we reviewed what your medicines are for, how to know if they are working, that your medicines are safe and how to make your medicine regimen as easy as possible.     1. When gone, switch Victoza to Trulicity 1.5mg ONCE WEEKLY     2. Monitor sugars every morning (goal less than 140) AND two hours after dinner (goal less than 180)     3. We could consider a professional continuous glucose monitor with diabetes education to see what your blood sugars do throughout the day     It was great to speak with you today.  I value your experience and would be very thankful for your time with providing feedback on our clinic survey. You may receive a survey via email or text message in the next few days.     Next MTM visit: 1 month     To schedule another MTM appointment, please call the clinic directly or you may call the MTM scheduling line at [phone] or toll-free at [phone].     My Clinical Pharmacist's contact information:                                                      It was a pleasure seeing you today!  Please feel free to contact me with any questions or concerns you have.      Carlo Jang, PharmD, BCACP  Medication Management (MTM) Pharmacist  [facility] & [facility]

## 2021-05-31 NOTE — TELEPHONE ENCOUNTER
WQ order received for Hematology Consult, referring physician Enio Lechuga MD.  Called pt. to get this scheduled and had to leave a VM message - asked pt. to call [phone] to get consult scheduled - Order Dx - Leukocytosis, unspecified type

## 2021-05-31 NOTE — PROGRESS NOTES
Vascular Medicine Progress note    Dr Jose Cruz Roger MD, Vascular Medicine      Obi Messer    Medical Record #:  [id number]    Date of Service: 08/12/2019     Date last seen:  Visit date not found    PRIMARY CARE PROVIDER: Enio Lechuga MD      IMPRESSION/PLAN: Bilateral leg swelling, patient was diagnosed back in 2013 with congestive heart failure he had 2 stents placed since then his ejection fraction went from 25 to 55% now patient does not exhibit any signs or symptoms suggestive of congestive heart failure  Patient complains of bilateral leg swelling he was started on Lasix he felt much better yet he still have swelling bilaterally  When asked about the swelling patient mentioned that the swelling is worse at the end of the day better in the early morning with restless leg symptoms yet there is no spontaneous bleeding or spontaneous ulceration  Patient denies any personal or family history of varicose veins or DVT  Patient also did complain of muscle achiness affecting both calf muscles this is when he exercise  Patient does not use any form of compression he does not like compression stockings    DISPOSITION:  1- bilateral lower extremity swelling/venous insufficiency  Doppler venous insufficiency study bilateral lower extremities  2- patient will definitely benefit from compression stockings the degree of compression and the level is to be determined after the study results are available  3- follow-up once test results are available      ______________________________________________________________________    Subjective:    ALLERGIES:  Patient has no known allergies.    MEDS:    Current Outpatient Medications:      ACCU-CHEK ALIYAH PLUS METER Misc, UTD, Disp: , Rfl: 12     atorvastatin (LIPITOR) 40 MG tablet, Take 40 mg by mouth at bedtime., Disp: , Rfl:      blood glucose test strips, Use 1 each As Directed daily. Dispense brand per patient's insurance at pharmacy discretion., Disp: 100 strip, Rfl:  3     CARTIA  mg 24 hr capsule, 120 mg daily.    , Disp: , Rfl:      digoxin (LANOXIN) 250 mcg tablet, Take 250 mcg by mouth daily. , Disp: , Rfl:      DULoxetine (CYMBALTA) 30 MG capsule, , Disp: , Rfl:      furosemide (LASIX) 40 MG tablet, Take 1 tablet (40 mg total) by mouth daily as needed (swelling)., Disp: 20 tablet, Rfl: 0     ibuprofen (ADVIL,MOTRIN) 600 MG tablet, Take 600 mg by mouth every 6 (six) hours as needed for pain., Disp: , Rfl:      lisinopril (PRINIVIL,ZESTRIL) 40 MG tablet, , Disp: , Rfl:      LORazepam (ATIVAN) 0.5 MG tablet, Take 0.5 mg by mouth 2 (two) times a day as needed for anxiety. , Disp: , Rfl:      metFORMIN (GLUCOPHAGE) 1000 MG tablet, Take 2,000 mg by mouth daily with breakfast.    , Disp: , Rfl:      metoprolol tartrate (LOPRESSOR) 100 MG tablet, Take 100 mg by mouth 2 (two) times a day., Disp: , Rfl:      polyvinyl alcohol (LIQUIFILM TEARS) 1.4 % ophthalmic solution, Administer 1 drop to both eyes as needed for dry eyes., Disp: , Rfl:      QUEtiapine (SEROQUEL) 100 MG tablet, Take 100 mg by mouth at bedtime., Disp: , Rfl:      QUEtiapine (SEROQUEL) 25 MG tablet, Take 25 mg by mouth 3 (three) times a day. Morning, afternoon and evening, Disp: , Rfl:      rivaroxaban 20 mg Tab, Take 20 mg by mouth daily with supper. , Disp: , Rfl:      senna-docusate (SENNOSIDES-DOCUSATE SODIUM) 8.6-50 mg tablet, Take 1 tablet by mouth 2 (two) times a day as needed for constipation. , Disp: , Rfl:      spironolactone (ALDACTONE) 25 MG tablet, Take 25 mg by mouth daily., Disp: , Rfl:      TiZANidine (ZANAFLEX) 4 MG capsule, Take 1 capsule (4 mg total) by mouth 3 (three) times a day as needed for muscle spasms., Disp: 30 capsule, Rfl: 1     tiZANidine (ZANAFLEX) 4 MG tablet, Take 1 tablet (4 mg total) by mouth every 8 (eight) hours as needed., Disp: 30 tablet, Rfl: 1     triamcinolone (KENALOG) 0.1 % cream, Apply topically 3 (three) times a day., Disp: 60 g, Rfl: 1     VICTOZA 3-AUBREY 0.6 mg/0.1  mL (18 mg/3 mL) PnIj injection, INJECT 1.8MG SC QD, Disp: , Rfl: 11     zolpidem (AMBIEN) 10 mg tablet, Take 10 mg by mouth at bedtime as needed for sleep., Disp: , Rfl:      HYDROcodone-acetaminophen 5-325 mg per tablet, Take 1 tablet by mouth 2 (two) times a day., Disp: 30 tablet, Rfl: 0    REVIEW OF SYSTEMS:    A 12 point ROS was reviewed and except for what is listed in the HPI above, all others are negative    Objective:    PE:  BP 90/60   Pulse 64   Resp 16   Wt (!) 295 lb (133.8 kg)   BMI 44.85 kg/m    Wt Readings from Last 1 Encounters:   08/12/19 (!) 295 lb (133.8 kg)     Body mass index is 44.85 kg/m .    EXAM:  GENERAL: This is a well-developed 64 y.o. male who appears his stated age  EYES: Grossly normal.  MOUTH: Buccal mucosa normal   CARDIAC:  Normal S1 and S2, no Murmur  CHEST/LUNG:  Clear lung fields bilaterally  GASTROINTESINAL (ABDOMEN):Soft, non-tender, B/S present, no pulsatile mass     MUSCULOSKELETAL: Grossly normal and both lower extremities are intact.  HEME/LYMPH: No lymphedema  NEUROLOGIC: Focally intact, Alert and oriented x 3.   PSYCH: appropriate affect  INTEGUMENT: No open lesions or ulcers  Pulse Exam: DP and PT pulses  Circumferential measures:  Vasc Edema 8/12/2019   Right just above MTP 24.2   Right Ankle 29   Right Widest Calf 47.6   Right Thigh Up 10cm 58.4   Left - just above MTP 24.7   Left Ankle 29   Left Widest Calf 45.8   Left Thigh Up 10cm 60     Incision 12/05/17 Knee Right (Active)        DIAGNOSTIC STUDIES:     No results found.  I personally reviewed the following imaging today and those on care everywhere, if indicated    LABS:      Sodium   Date Value Ref Range Status   06/25/2019 132 (L) 136 - 145 mmol/L Final   06/22/2016 138 136 - 145 mmol/L Final   03/25/2016 136 136 - 145 mmol/L Final     Potassium   Date Value Ref Range Status   06/25/2019 4.4 3.5 - 5.0 mmol/L Final   12/05/2017 4.5 3.5 - 5.0 mmol/L Final   08/07/2017 3.7 3.5 - 5.0 mmol/L Final     Chloride    Date Value Ref Range Status   06/25/2019 95 (L) 98 - 107 mmol/L Final   06/22/2016 101 98 - 107 mmol/L Final   03/25/2016 97 (L) 98 - 107 mmol/L Final     BUN   Date Value Ref Range Status   06/25/2019 25 (H) 8 - 22 mg/dL Final   06/22/2016 15 8 - 22 mg/dL Final   03/25/2016 11 8 - 22 mg/dL Final     Creatinine   Date Value Ref Range Status   06/25/2019 1.51 (H) 0.70 - 1.30 mg/dL Final   12/05/2017 0.75 0.70 - 1.30 mg/dL Final   08/07/2017 0.74 0.70 - 1.30 mg/dL Final     Hemoglobin   Date Value Ref Range Status   08/06/2019 13.0 (L) 14.0 - 18.0 g/dL Final   08/06/2019 13.1 (L) 14.0 - 18.0 g/dL Final   06/25/2019 14.4 14.0 - 18.0 g/dL Final     Platelets   Date Value Ref Range Status   08/06/2019 225 140 - 440 thou/uL Final   08/06/2019 236 140 - 440 thou/uL Final   06/25/2019 244 140 - 440 thou/uL Final     BNP   Date Value Ref Range Status   06/25/2019 25 0 - 59 pg/mL Final     INR   Date Value Ref Range Status   12/05/2017 1.06 0.90 - 1.10 Final   08/07/2017 0.94 0.90 - 1.10 Final       Total time spent 25  minutes face to face with patient with more than 50% time spent in counseling and coordination of care.    Jose Cruz Roger MD  VASCULAR MEDICINE

## 2021-05-31 NOTE — PROGRESS NOTES
consult; Lymphedema of both lower extremities, it started in June. Pt does not wear compression stockings. no hx of ca; Enio Lechuga MD referring

## 2021-05-31 NOTE — PROGRESS NOTES
Office Visit - Follow Up   Obi Messer   64 y.o. male    Date of Visit: 8/28/2019    Chief Complaint   Patient presents with     Hip Pain     R hip pain x1 year, wants referral to get cortizone injection         Assessment and Plan   1. Hip pain, right  Has recurring right hip pains getting more intense.  Due to osteoarthritis aggravated by his morbid obesity.  Wants to see orthopedics for possible cortisone injection to the right hip.  In the meantime, okay to continue hydrocodone with acetaminophen  and can increase the dose to 3 times a day.  - Ambulatory referral to Orthopedics  - HYDROcodone-acetaminophen 5-325 mg per tablet; Take 1 tablet by mouth 3 (three) times a day.  Dispense: 90 tablet; Refill: 0    2. Chronic pain of right knee,s/p knee replacement, 2017  Also has chronic pain of the right knee despite his right knee replacement in 2017.  Reports his left knee pains are improved.  Okay to continue hydrocodone with acetaminophen.  - HYDROcodone-acetaminophen 5-325 mg per tablet; Take 1 tablet by mouth 3 (three) times a day.  Dispense: 90 tablet; Refill: 0    3. Chronic atrial fibrillation (H)  Has chronic atrial fibrillation.  Takes Xarelto, metoprolol and digoxin.  Okay to get a refill of digoxin.  - digoxin (LANOXIN) 250 mcg tablet; Take 1 tablet (250 mcg total) by mouth daily.  Dispense: 90 tablet; Refill: 3      Follow up in in 3 months as previously scheduled.     History of Present Illness   This 64 y.o. old male is here primarily because of recurring right hip pains.  He has been having right hip pains for more than a year now.  But the last several months to weeks his right hip pains are getting more intense.  Had right knee replacement in 2017.  But despite his right knee replacement he still has continuing right knee pains.  Trying to learn to live with his aches and pains.  Takes hydrocodone with acetaminophen twice a day but wants to increase this to 3 times a day.  His aches and pains  specifically involving his knees and  right hip are due to osteoarthritis aggravated by his morbid obesity.  Has atrial fibrillation.  Takes Xarelto as  his blood thinner and metoprolol, digoxin for rate control.  Overall, stable.    Review of Systems   A 12 point comprehensive review of systems was negative except as noted..     Medications, Allergies and Problem List   Reviewed and updated             Chief Complaint   Hip Pain (R hip pain x1 year, wants referral to get cortizone injection )       Patient Profile   Social History     Social History Narrative     in May, 2018. 3 grown children, 2 sons and 1 daughter. 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 Nonsmoker. Socially drinks alcohol 2 times a month.         Past Medical History   Patient Active Problem List   Diagnosis     Rupture of right quadriceps muscle, subsequent encounter     Chronic combined systolic and diastolic congestive heart failure (H)     Coronary artery disease due to lipid rich plaque, s/p stent LAD and OM, 2013     Type 2 diabetes mellitus without complication, without long-term current use of insulin (H)     Essential hypertension     Mixed hyperlipidemia     Venous stasis dermatitis of right lower extremity     Recurrent major depressive disorder, in full remission (H)     Chronic atrial fibrillation (H)       Past Surgical History  He has a past surgical history that includes Cholecystectomy; LEFT TOTAL KNEE ARTHROPLASTY  (2016); Tonsilectomy, adenoidectomy, bilateral myringotomy and tubes (AGE 11); CERVICAL 5 (2004); Replacement total knee (Right); and Quadriceps tendon repair (Right, 12/5/2017).       Medications and Allergies   Current Outpatient Medications   Medication Sig     ACCU-CHEK ALIYAH PLUS METER Misc checking once per day     acetaminophen (TYLENOL) 500 MG tablet Take 1,000 mg by mouth every 6 (six) hours as needed for pain.     atorvastatin (LIPITOR) 40 MG tablet Take 40 mg by mouth at bedtime.     blood glucose  test strips Use 1 each As Directed daily. Dispense brand per patient's insurance at pharmacy discretion.     doxepin (SINEQUAN) 10 MG capsule Take 1 capsule by mouth as needed.     dulaglutide (TRULICITY) 1.5 mg/0.5 mL PnIj Inject 1.5 mg under the skin every 7 days.     DULoxetine (CYMBALTA) 30 MG capsule Take 30 mg by mouth daily. ALONG WITH 60MG DOSE, TOTAL 90MG DAILY            DULoxetine (CYMBALTA) 60 MG capsule Take 1 capsule by mouth daily. ALONG WITH THE 30MG FOR A TOTAL DAILY DOSE OF 90MG     furosemide (LASIX) 40 MG tablet Take 1 tablet (40 mg total) by mouth daily as needed (swelling).     HYDROcodone-acetaminophen 5-325 mg per tablet Take 1 tablet by mouth 3 (three) times a day.     lisinopril (PRINIVIL,ZESTRIL) 40 MG tablet Take 1 tablet by mouth daily.           metFORMIN (GLUMETZA) 1000 MG (MOD) 24 hr tablet Take 2,000 mg by mouth daily with breakfast.     metoprolol tartrate (LOPRESSOR) 100 MG tablet Take 100 mg by mouth 2 (two) times a day.     polyvinyl alcohol (LIQUIFILM TEARS) 1.4 % ophthalmic solution Administer 1 drop to both eyes as needed for dry eyes.     QUEtiapine (SEROQUEL) 100 MG tablet Take 100 mg by mouth at bedtime.     QUEtiapine (SEROQUEL) 25 MG tablet Take 25 mg by mouth 3 (three) times a day. Morning, afternoon and evening     rivaroxaban 20 mg Tab Take 20 mg by mouth daily with supper.      spironolactone (ALDACTONE) 25 MG tablet Take 25 mg by mouth daily.     tiZANidine (ZANAFLEX) 4 MG tablet Take 1 tablet (4 mg total) by mouth 2 (two) times a day as needed.     CARTIA  mg 24 hr capsule Take 120 mg by mouth daily.           digoxin (LANOXIN) 250 mcg tablet Take 1 tablet (250 mcg total) by mouth daily.     No Known Allergies     Family and Social History   Family History   Problem Relation Age of Onset     Heart disease Father         Social History     Tobacco Use     Smoking status: Former Smoker     Last attempt to quit: 2014     Years since quittin.6      "Smokeless tobacco: Current User   Substance Use Topics     Alcohol use: Yes     Drug use: No        Physical Exam       Physical Exam  /64   Pulse 89   Ht 5' 8\" (1.727 m)   Wt (!) 283 lb (128.4 kg)   SpO2 97%   BMI 43.03 kg/m    General appearance: alert, appears stated age, cooperative, no distress and morbidly obese  Head: Normocephalic, without obvious abnormality, atraumatic  Throat: lips, mucosa, and tongue normal; teeth and gums normal  Neck: no adenopathy, no carotid bruit, no JVD, supple, symmetrical, trachea midline and thyroid not enlarged, symmetric, no tenderness/mass/nodules  Lungs: clear to auscultation bilaterally  Heart: irregularly irregular rhythm  Abdomen: soft, non-tender; bowel sounds normal; no masses,  no organomegaly  Extremities: extremities normal, atraumatic, no cyanosis or edema  Skin: Skin color, texture, turgor normal. No rashes or lesions  Musculoskeletal: Tender right hip     Additional Information        Enio Lechuga MD  Internal Medicine  Contact me at [phone]     Additional Information   Current Outpatient Medications   Medication Sig     ACCU-CHEK ALIYAH PLUS METER Misc checking once per day     acetaminophen (TYLENOL) 500 MG tablet Take 1,000 mg by mouth every 6 (six) hours as needed for pain.     atorvastatin (LIPITOR) 40 MG tablet Take 40 mg by mouth at bedtime.     blood glucose test strips Use 1 each As Directed daily. Dispense brand per patient's insurance at pharmacy discretion.     doxepin (SINEQUAN) 10 MG capsule Take 1 capsule by mouth as needed.     dulaglutide (TRULICITY) 1.5 mg/0.5 mL PnIj Inject 1.5 mg under the skin every 7 days.     DULoxetine (CYMBALTA) 30 MG capsule Take 30 mg by mouth daily. ALONG WITH 60MG DOSE, TOTAL 90MG DAILY            DULoxetine (CYMBALTA) 60 MG capsule Take 1 capsule by mouth daily. ALONG WITH THE 30MG FOR A TOTAL DAILY DOSE OF 90MG     furosemide (LASIX) 40 MG tablet Take 1 tablet (40 mg total) by mouth daily as " needed (swelling).     HYDROcodone-acetaminophen 5-325 mg per tablet Take 1 tablet by mouth 3 (three) times a day.     lisinopril (PRINIVIL,ZESTRIL) 40 MG tablet Take 1 tablet by mouth daily.           metFORMIN (GLUMETZA) 1000 MG (MOD) 24 hr tablet Take 2,000 mg by mouth daily with breakfast.     metoprolol tartrate (LOPRESSOR) 100 MG tablet Take 100 mg by mouth 2 (two) times a day.     polyvinyl alcohol (LIQUIFILM TEARS) 1.4 % ophthalmic solution Administer 1 drop to both eyes as needed for dry eyes.     QUEtiapine (SEROQUEL) 100 MG tablet Take 100 mg by mouth at bedtime.     QUEtiapine (SEROQUEL) 25 MG tablet Take 25 mg by mouth 3 (three) times a day. Morning, afternoon and evening     rivaroxaban 20 mg Tab Take 20 mg by mouth daily with supper.      spironolactone (ALDACTONE) 25 MG tablet Take 25 mg by mouth daily.     tiZANidine (ZANAFLEX) 4 MG tablet Take 1 tablet (4 mg total) by mouth 2 (two) times a day as needed.     CARTIA  mg 24 hr capsule Take 120 mg by mouth daily.           digoxin (LANOXIN) 250 mcg tablet Take 1 tablet (250 mcg total) by mouth daily.     No Known Allergies  Social History     Tobacco Use     Smoking status: Former Smoker     Last attempt to quit: 2014     Years since quittin.6     Smokeless tobacco: Current User   Substance Use Topics     Alcohol use: Yes     Drug use: No         Time: total time spent with the patient was 25 minutes of which >50% was spent in counseling and coordination of care

## 2021-05-31 NOTE — PROGRESS NOTES
Office Visit - Follow Up   Obi Messer   64 y.o. male    Date of Visit: 8/6/2019    Chief Complaint   Patient presents with     Follow-up     4 wk f/u-pt states that he has had some low blood pressure, he had gotten light headed and dizzy, pt states that he had fallen twice, pt decreased cartia to 1 tablet every 3 days.        Assessment and Plan   1. Type 2 diabetes mellitus without complication, without long-term current use of insulin (H)  Diabetes is still not well controlled despite taking Victoza and metformin.  A1c on 6/25/2019 was  increased at 8.8.  Continues same Victoza and metformin for now.  Will refer him to our Pharm.D. for medication management.  - Ambulatory referral to Medication Management    2. Coronary artery disease due to lipid rich plaque, s/p stent LAD and OM, 2013  Doing well.  Denies chest pain shortness of breath.  Saw  on 7/3/2019 because of chronic dyspnea.  Was assessed his chronic dyspnea is multifactorial but primarily  due to his obesity and deconditioning.  Had normal BNP.  Also has chronic lower extremity edema with chronic venous dermatitis.  Wants to to follow with his previous cardiologist, Dr. Cagle at [facility].    3. Essential hypertension  Controlled.  But now shows low blood pressure readings.  He cut down  his diltiazem to every other day and takes furosemide only as needed now.  Since he cut down diltiazem and taking furosemide as needed his blood pressure hovers between 110 to 120 over 80s.     4. Chronic atrial fibrillation (H)  Has chronic atrial fibrillation and rate controlled by metoprolol and diltiazem.  Takes Xarelto.    5. Mixed hyperlipidemia  Controlled.  Lipids on 6/25/2019 showed LDL 45, HDL 38, triglycerides 108 and total cholesterol 105.  Continue atorvastatin.    6. Chronic combined systolic and diastolic congestive heart failure (H)  Currently compensated.  No increased shortness of breath, leg edema, exertional dyspnea and  orthopnea.  Continue same cardiac medications.    7. Leukocytosis, unspecified type  Has leukocytosis.  White cell count covers between 13 to 15,000.  Apparently this is an old finding by his previous primary doctor.  Reports he was worked up for this and nothing came out to account for his increasing leukocytosis but suspect he has CLL.  Will do CBC and peripheral smear.  Will refer to hematology.  - HM1(CBC and Differential)  - Morphology,Smear Review (MORP)  - Ambulatory referral to Hematology  - HM1 (CBC with Diff)    8. Venous stasis dermatitis of right lower extremity  Has venous stasis dermatitis of right lower extremity.  But seems to be improved now.  Does not have infection or inflammation of  his lower leg due to his chronic pains stasis dermatitis.    9. Chronic pain of  bilateral Knee  Complains of chronic pains on his knees and lower back.  Wants to continue taking tizanidine as needed and hydrocodone with acetaminophen as needed.  Okay to get refill.  - TiZANidine (ZANAFLEX) 4 MG capsule; Take 1 capsule (4 mg total) by mouth 3 (three) times a day as needed for muscle spasms.  Dispense: 30 capsule; Refill: 1  - HYDROcodone-acetaminophen 5-325 mg per tablet; Take 1 tablet by mouth 2 (two) times a day.  Dispense: 30 tablet; Refill: 0    Follow up in 3 months.     History of Present Illness   This 64 y.o. old male, his second visit, is here for follow-up.  Was seen in early June for his multiple medical problems.  Was referred to renal for his chronic kidney disease.  Was seen a week ago.  But I do not have the notes of the renal doctor.  Has diabetes.  Still not well controlled despite Victoza and metformin.  Last A1c was increased at 8.8.  Has chronic systolic  and diastolic heart failure but now compensated.  Was seen by cardiology in early July 2019.  Was advised to chronic dyspnea is multifactorial but specifically brought about by his deconditioning and morbid obesity.  Has chronic  leg edema  causing stasis dermatitis but today dermatitis is not inflamed or infected.  Has diabetes not ideally controlled.  Has hypertension controlled by his medication.  Has hyperlipidemia controlled by atorvastatin.  Complains of chronic knee pains.  Takes hydrocodone with acetaminophen and tizanidine.  Wants to continue with these same medications.  Overall stable.    Review of Systems   A 12 point comprehensive review of systems was negative except as noted..     Medications, Allergies and Problem List   Reviewed and updated             Chief Complaint   Follow-up (4 wk f/u-pt states that he has had some low blood pressure, he had gotten light headed and dizzy, pt states that he had fallen twice, pt decreased cartia to 1 tablet every 3 days.)       Patient Profile   Social History     Social History Narrative     in May, 2018. 3 grown children, 2 sons and 1 daughter. 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 Nonsmoker. Socially drinks alcohol 2 times a month.         Past Medical History   Patient Active Problem List   Diagnosis     Rupture of right quadriceps muscle, subsequent encounter     Chronic combined systolic and diastolic congestive heart failure (H)     Coronary artery disease due to lipid rich plaque, s/p stent LAD and OM, 2013     Type 2 diabetes mellitus without complication, without long-term current use of insulin (H)     Essential hypertension     Mixed hyperlipidemia     Venous stasis dermatitis of right lower extremity     Recurrent major depressive disorder, in full remission (H)     Chronic atrial fibrillation (H)       Past Surgical History  He has a past surgical history that includes Cholecystectomy; LEFT TOTAL KNEE ARTHROPLASTY  (2016); Tonsilectomy, adenoidectomy, bilateral myringotomy and tubes (AGE 11); CERVICAL 5 (2004); Replacement total knee (Right); and Quadriceps tendon repair (Right, 12/5/2017).       Medications and Allergies   Current Outpatient Medications   Medication Sig      ACCU-CHEK ALIYAH PLUS METER Misc UTD     atorvastatin (LIPITOR) 40 MG tablet Take 40 mg by mouth at bedtime.     blood glucose test strips Use 1 each As Directed daily. Dispense brand per patient's insurance at pharmacy discretion.     CARTIA  mg 24 hr capsule 120 mg daily.            digoxin (LANOXIN) 250 mcg tablet Take 250 mcg by mouth daily.      DULoxetine (CYMBALTA) 30 MG capsule      furosemide (LASIX) 40 MG tablet Take 1 tablet (40 mg total) by mouth daily as needed (swelling).     HYDROcodone-acetaminophen 5-325 mg per tablet Take 1 tablet by mouth 2 (two) times a day.     ibuprofen (ADVIL,MOTRIN) 600 MG tablet Take 600 mg by mouth every 6 (six) hours as needed for pain.     lisinopril (PRINIVIL,ZESTRIL) 40 MG tablet      LORazepam (ATIVAN) 0.5 MG tablet Take 0.5 mg by mouth 2 (two) times a day as needed for anxiety.      metFORMIN (GLUCOPHAGE) 1000 MG tablet Take 2,000 mg by mouth daily with breakfast.            metoprolol tartrate (LOPRESSOR) 100 MG tablet Take 100 mg by mouth 2 (two) times a day.     polyvinyl alcohol (LIQUIFILM TEARS) 1.4 % ophthalmic solution Administer 1 drop to both eyes as needed for dry eyes.     QUEtiapine (SEROQUEL) 100 MG tablet Take 100 mg by mouth at bedtime.     QUEtiapine (SEROQUEL) 25 MG tablet Take 25 mg by mouth 3 (three) times a day. Morning, afternoon and evening     rivaroxaban 20 mg Tab Take 20 mg by mouth daily with supper.      senna-docusate (SENNOSIDES-DOCUSATE SODIUM) 8.6-50 mg tablet Take 1 tablet by mouth 2 (two) times a day as needed for constipation.      spironolactone (ALDACTONE) 25 MG tablet Take 25 mg by mouth daily.     TiZANidine (ZANAFLEX) 4 MG capsule Take 1 capsule (4 mg total) by mouth 3 (three) times a day as needed for muscle spasms.     triamcinolone (KENALOG) 0.1 % cream Apply topically 3 (three) times a day.     VICTOZA 3-AUBREY 0.6 mg/0.1 mL (18 mg/3 mL) PnIj injection INJECT 1.8MG SC QD     zolpidem (AMBIEN) 10 mg tablet Take 10 mg by  "mouth at bedtime as needed for sleep.     No Known Allergies     Family and Social History   Family History   Problem Relation Age of Onset     Heart disease Father         Social History     Tobacco Use     Smoking status: Former Smoker     Last attempt to quit: 2014     Years since quittin.5     Smokeless tobacco: Current User   Substance Use Topics     Alcohol use: No     Drug use: No        Physical Exam       Physical Exam  /68   Pulse 79   Ht 5' 8\" (1.727 m)   Wt (!) 294 lb (133.4 kg)   SpO2 95% Comment: RA  BMI 44.70 kg/m    General appearance: alert, appears stated age, cooperative, no distress and morbidly obese  Head: Normocephalic, without obvious abnormality, atraumatic  Throat: lips, mucosa, and tongue normal; teeth and gums normal  Neck: no adenopathy, no carotid bruit, no JVD, supple, symmetrical, trachea midline and thyroid not enlarged, symmetric, no tenderness/mass/nodules  Lungs: clear to auscultation bilaterally  Heart: irregularly irregular rhythm  Abdomen: soft, non-tender; bowel sounds normal; no masses,  no organomegaly  Extremities: venous stasis dermatitis noted and edematous  Skin: Skin color, texture, turgor normal. No rashes or lesions  Neurologic: Grossly normal     Additional Information        Enio Lechuga MD  Internal Medicine  Contact me at [phone]     Additional Information   Current Outpatient Medications   Medication Sig     ACCU-CHEK ALIYAH PLUS METER Misc UTD     atorvastatin (LIPITOR) 40 MG tablet Take 40 mg by mouth at bedtime.     blood glucose test strips Use 1 each As Directed daily. Dispense brand per patient's insurance at pharmacy discretion.     CARTIA  mg 24 hr capsule 120 mg daily.            digoxin (LANOXIN) 250 mcg tablet Take 250 mcg by mouth daily.      DULoxetine (CYMBALTA) 30 MG capsule      furosemide (LASIX) 40 MG tablet Take 1 tablet (40 mg total) by mouth daily as needed (swelling).     HYDROcodone-acetaminophen 5-325 mg " per tablet Take 1 tablet by mouth 2 (two) times a day.     ibuprofen (ADVIL,MOTRIN) 600 MG tablet Take 600 mg by mouth every 6 (six) hours as needed for pain.     lisinopril (PRINIVIL,ZESTRIL) 40 MG tablet      LORazepam (ATIVAN) 0.5 MG tablet Take 0.5 mg by mouth 2 (two) times a day as needed for anxiety.      metFORMIN (GLUCOPHAGE) 1000 MG tablet Take 2,000 mg by mouth daily with breakfast.            metoprolol tartrate (LOPRESSOR) 100 MG tablet Take 100 mg by mouth 2 (two) times a day.     polyvinyl alcohol (LIQUIFILM TEARS) 1.4 % ophthalmic solution Administer 1 drop to both eyes as needed for dry eyes.     QUEtiapine (SEROQUEL) 100 MG tablet Take 100 mg by mouth at bedtime.     QUEtiapine (SEROQUEL) 25 MG tablet Take 25 mg by mouth 3 (three) times a day. Morning, afternoon and evening     rivaroxaban 20 mg Tab Take 20 mg by mouth daily with supper.      senna-docusate (SENNOSIDES-DOCUSATE SODIUM) 8.6-50 mg tablet Take 1 tablet by mouth 2 (two) times a day as needed for constipation.      spironolactone (ALDACTONE) 25 MG tablet Take 25 mg by mouth daily.     TiZANidine (ZANAFLEX) 4 MG capsule Take 1 capsule (4 mg total) by mouth 3 (three) times a day as needed for muscle spasms.     triamcinolone (KENALOG) 0.1 % cream Apply topically 3 (three) times a day.     VICTOZA 3-AUBREY 0.6 mg/0.1 mL (18 mg/3 mL) PnIj injection INJECT 1.8MG SC QD     zolpidem (AMBIEN) 10 mg tablet Take 10 mg by mouth at bedtime as needed for sleep.     No Known Allergies  Social History     Tobacco Use     Smoking status: Former Smoker     Last attempt to quit: 2014     Years since quittin.5     Smokeless tobacco: Current User   Substance Use Topics     Alcohol use: No     Drug use: No         Time: total time spent with the patient was 40 minutes of which >50% was spent in counseling and coordination of care

## 2021-05-31 NOTE — TELEPHONE ENCOUNTER
Received return call from the patient to set up appointment. Same scheduled for Monday, September 9, 2019 at 3:00 pm with Dr. Holman, with a nurse apt at 2:45 pm. Packet sent via email to the pt with informational letter, MD bio card, [facility] intake form, medication and allergy list, and appointment letter. Work up for Leukocytosis has been done at [facility].   Medical records will ensure all records are available at the time of consultation.    I explained that the pt. would be coming to a Cancer Center and that the doctors are Oncologists as well as Hematologists. Explained the appointment process of arriving early and bringing his Health History and medication allergy list along to his appointment.   My phone number was given to the pt. for any question or concerns - [phone].

## 2021-06-01 NOTE — CONSULTS
[facility] Hematology and Oncology Consult Note    Patient: Obi Messer  MRN: [id number]  Date of Service: 09/09/2019        Reason for Visit    I was consulted by Dr. Lechuga regarding elevated WBC count    Assessment/Plan    Mild normocytic anemia  Intermittent leukocytosis    Most recent CBC shows normal WBC count and differential.  Very minimal anemia, normal MCV and normal platelet count.  Patient feeling well.  Exam is unrevealing.  At this point would just recommend observation of the CBC.    He has had intermittent leukocytosis which may be related to mild inflammation.  Not on any medications that would do this.  No suggestion of a lymphoproliferative or myeloproliferative disorder.  No suggestion of hemolysis on review of blood work.  He has previously had flow cytometry which has been normal and there is no evidence of monoclonal protein.    Normal liver function test as well.    Intermittent leukocytosis and mild any inflammation.  Given the lack of any other findings I would just recommend observation at this time.  No additional testing recommended.  Should have CBC checked periodically.  Refer back to us if there is persistent rise in WBC count or progressive anemia.    Plan: No additional work-up at this time  Follow-up with primary to monitor CBC          ECOG Performance      Distress Assessment           Problem List    1. Leukocytosis, unspecified type             CC: Enio Lechuga MD      ______________________________________________________________________________      Staging History    Cancer Staging  No matching staging information was found for the patient.    History    Mr. Obi Messer is a 64-year-old with past history of hyperlipidemia, depression, diabetes, coronary artery disease, atrial fibrillation and osteoarthritis who is referred for evaluation of elevated WBC count.    He was noted with elevated WBC count in June but recheck in August was normal.  He is noted with  very mild anemia but normal MCV and normal WBC differential.  Personal smear noted some spherocytes but his other labs do not show any evidence for hemolysis.    Overall patient feels well.  He describes some joint symptoms for which she has received steroid injections in the past.  No infection issues.  No mild sores.  No rash.  Currently not on any steroids.    Past surgical history includes spine fusion, right knee and left knee replacements, C-spine fusion and cholecystectomy.    He is retired,  and lives in an apartment.  Lost his son to suicide.    Has not smoked for a number of years.  Drinks occasionally.    Is admit to occasional night sweats and some weight gain with fatigue.  Has had dental problems recently.  Occasional leg swelling.  Some pain in his muscles and joints.  Difficulty with balance numbness and tingling occasionally.  Past History  Past Medical History:   Diagnosis Date     Acute renal impairment      Atrial flutter (H)      Bereavement, uncomplicated      Bipolar affective disorder (H)      Bursitis of shoulder      Cervical vertebral fusion      CHF (congestive heart failure) (H)      Closed fracture of proximal end of right tibia with routine healing      Coronary artery disease      Depression with anxiety      Dermatomyositis (H)      Dyslipidemia      Encounter for screening for malignant neoplasm of prostate      Esophagitis      ETOH abuse     no longer drinking     Fracture of proximal end of humerus      History of smoking      Hyperlipidemia      Hypertension      Knee pain      Major depressive disorder      Medial meniscus tear      Morbid obesity (H)      Osteoarthritis of knee      Paroxysmal atrial fibrillation (H)      Polyp of colon      Status post closed fracture of right tibia      Type 2 diabetes mellitus (H)      Past Surgical History:   Procedure Laterality Date     CERVICAL 5 2004    DIALTION AND FUSION     CHOLECYSTECTOMY       LEFT TOTAL KNEE ARTHROPLASTY    2016     QUADRICEPS TENDON REPAIR Right 2017    Procedure: RIGHT KNEE QUADRICEPS TENDON REPAIR;  Surgeon: Sean Ponce DO;  Location: [facility] OR;  Service:      REPLACEMENT TOTAL KNEE Right      TONSILECTOMY, ADENOIDECTOMY, BILATERAL MYRINGOTOMY AND TUBES  AGE 11     Family History   Problem Relation Age of Onset     Heart disease Father      Social History     Socioeconomic History     Marital status:      Spouse name: None     Number of children: None     Years of education: None     Highest education level: None   Occupational History     None   Social Needs     Financial resource strain: None     Food insecurity:     Worry: None     Inability: None     Transportation needs:     Medical: None     Non-medical: None   Tobacco Use     Smoking status: Former Smoker     Last attempt to quit: 2014     Years since quittin.6     Smokeless tobacco: Current User   Substance and Sexual Activity     Alcohol use: Yes     Comment: 2/week     Drug use: No     Sexual activity: Never   Lifestyle     Physical activity:     Days per week: None     Minutes per session: None     Stress: None   Relationships     Social connections:     Talks on phone: None     Gets together: None     Attends Caodaism service: None     Active member of club or organization: None     Attends meetings of clubs or organizations: None     Relationship status: None     Intimate partner violence:     Fear of current or ex partner: None     Emotionally abused: None     Physically abused: None     Forced sexual activity: None   Other Topics Concern     None   Social History Narrative     in May, 2018. 3 grown children, 2 sons and 1 daughter. 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 Nonsmoker. Socially drinks alcohol 2 times a month.      Allergies    No Known Allergies    Review of Systems    General  General (WDL): Exceptions to WDL  Fatigue: Yes - Recent (Less than 3 months)  ENT  ENT (WDL): Exceptions to  WDL  Glasses or Contacts: Yes - Chronic (Greater than 3 months)  Dental Problems: Yes - Recent (Less than 3 months)  Dentures: Yes - Recent (Less than 3 months)  Respiratory  Respiratory (WDL): All respiratory elements are within defined limits  Cardiovascular  Cardiovascular (WDL): Exceptions to WDL  Palpitations: Yes - Recent (Less than 3 months)  Irregular Heart Beat: Yes - Chronic (Greater than 3 months)  Lightheadedness: Yes - Recent (Less than 3 months)  Endocrine  Endocrine (WDL): Exceptions to WDL  Hotflashes: Yes -Recent (Less than 3 months)  Gastrointestinal  Gastrointestinal (WDL): All gastrointestinal elements are within defined limits  Musculoskeletal  Musculoskeletal (WDL): Exceptions to WDL  Joint pain: Yes - Chronic (Greater than 3 months)  Difficulty to lie flat for more than 30 minutes: Yes - Recent (Less than 3 months)  Pain interfering with walking: Yes - Recent (Less than 3 months)  Muscle pain or stiffness: Yes - Recent (Less than 3 months)  Recent fall: Yes - Recent (Less than 3 months)  Assistive device: Yes - Recent (Less than 3 months)(Cane)  Neurological  Neurological (WDL): Exceptions to WDL  History of LOC?: Yes - Recent (Less than 3 months)  Dominant Hand: Right  Difficulty with Balance: Yes - Recent (Less than 3 months)  Numbness and/or tingling: Yes - Recent (Less than 3 months)  Psychological/Emotional  Psychological/Emotional (WDL): Exceptions to WDL  Depression: Yes - Chronic (Greater than 3 months)  Anxiety: Yes - Chronic (Greater than 3 months)  Hematological/Lymphatic  Hematological/Lymphatic (WDL): All hematological/lymphatic elements are within defined limits  Dermatological  Dermatologic (WDL): All dermatological elements are within defined limits  Genitourinary/Reproductive  Genitourinary/Reproductive (WDL): Exceptions to WDL  Urination more than 2 times a night: Yes - Recent (Less than 3 months)  Reproductive (Females only)     Pain  Currently in Pain: Yes  Pain Score  "(Initial OR Reassessment): 6  Pain Frequency: Constant/continuous  Location: Knees  Pain Characteristics : Aching  Pain Intervention(s): Home medication  Response to Interventions: Acceptable, 4/10.    Physical Exam    Recent Vitals 9/9/2019   Height 5' 8\"   Weight 284 lbs 11 oz   BSA (m2) 2.49 m2   /83   Pulse 78   Temp 98.5   Temp src 1   SpO2 95   Some recent data might be hidden       GENERAL: Alert and oriented to time place and person. Seated comfortably. In no distress.    HEAD: Atraumatic and normocephalic.    EYES: EVE, EOMI.  No pallor.  No icterus.    Oral cavity: no mucosal lesion or tonsillar enlargement.    NECK: supple. JVP normal.  No thyroid enlargement.    LYMPH NODES: No palpable, cervical, axillary or inguinal lymphadenopathy.    CHEST: clear to auscultation bilaterally.  Resonant to percussion throughout bilaterally.  Symmetrical breath movements bilaterally.    CVS: S1 and S2 are heard. Regular rate and rhythm.  No murmur or gallop or rub heard.  No peripheral edema.    ABDOMEN: Soft. Not tender. Not distended.  No palpable hepatomegaly or splenomegaly.  No other mass palpable.  Bowel sounds heard.    EXTREMITIES: Warm.    SKIN: no rash, or bruising or purpura.  Has a full head of hair.      Lab Results    CBC 8 6 white count 8.9 hemoglobin 13 MCV 93 platelets 225 with normal differential.  CMP shows increased creatinine but normal liver function tests.  WBC count has been intermittently elevated in the past but with no rising trend.  Flow cytometry and SPEP are normal.  Imaging Results    No results found.      Signed by: Singh Holman MD  "

## 2021-06-01 NOTE — PROGRESS NOTES
MTM Consult Encounter    Obi Messer is a 64 y.o. male referred for a clinical pharmacist consult from Dr. Lechuga for diabetes education.  He mentions that in June was the first time he went to Medicare, he is now having a hard time getting coverage of some of his medications he was previously stable on.  This includes Xarelto and Cartia.      Discussion:   Type 2 diabetes: He has now been on Trulicity for about 3 weeks and does find that he has a little bit of an improvement in decreasing his appetite and decreased grazing in the evenings.  He has lost about 12 pounds in the last 2 weeks after this medication switch, however at the same time he has significantly changed his diet.  Denies nausea or dyspepsia.  His blood sugars continue to be about stable in the 250 range.  He continues to check his blood sugars every morning but commonly does not check in the evening since he does continue to snack.  His biggest meal the day is around noon.  He will be seeing the diabetes educator early next week for the professional CGM device.  We do not yet know what the coverage looks like for Trulicity, if this is positively contributing to weight loss, he would likely benefit from continuing this if affordable.  Additionally he would like to know about other new medicines on the market, in particular he brought up Jardiance.  We did discuss benefits versus risks of this medication, including positive cardiac benefits and fluid status however may impact his electrolytes will require more close lab monitoring.  Additionally discussed very cost effective medication such as glipizide, which does not have as positive benefits with regards to weight loss, but would help establish whether he is continuing to make a sufficient amount of insulin.  He continues on 1000 mg daily of metformin.  Reviewed previous renal function.  Of note he is having more pain in his knees and hips, he will be seeing orthopedics tomorrow in hopes to  get an injection.  Reviewed the potential benefits for higher doses of duloxetine, he will be seeing his psychiatrist in a few weeks and will bring this up with them as well.  Lab Results   Component Value Date    HGBA1C 8.8 (H) 06/25/2019    HGBA1C 7.9 (H) 06/22/2016     Lab Results   Component Value Date    LDLCALC 45 06/25/2019    CREATININE 1.51 (H) 06/25/2019     Plan:  1.  Educated to continue Trulicity 1.5 mg weekly, if cost effective  2.  Educate on initiation of glipizide 10 mg once daily in the morning before breakfast  3.  Educated to look into cost of Jardiance 10 mg daily    Follow up:   In 2 weeks via mychart after CGM with Noreen Jang, PharmD, BCACP  Medication Management (MTM) Pharmacist  [facility]      Current Outpatient Medications   Medication Sig Dispense Refill     metFORMIN (GLUCOPHAGE-XR) 500 MG 24 hr tablet Take 2 tablets by mouth daily.        2     ACCU-CHEK ALIYAH PLUS METER Misc checking once per day  12     acetaminophen (TYLENOL) 500 MG tablet Take 1,000 mg by mouth every 6 (six) hours as needed for pain.       atorvastatin (LIPITOR) 40 MG tablet Take 40 mg by mouth at bedtime.       blood glucose test strips Use 1 each As Directed daily. Dispense brand per patient's insurance at pharmacy discretion. 100 strip 3     digoxin (LANOXIN) 250 mcg tablet Take 1 tablet (250 mcg total) by mouth daily. 90 tablet 3     doxepin (SINEQUAN) 10 MG capsule Take 1 capsule by mouth as needed.  1     dulaglutide (TRULICITY) 1.5 mg/0.5 mL PnIj Inject 1.5 mg under the skin every 7 days. 2 mL 2     DULoxetine (CYMBALTA) 30 MG capsule Take 30 mg by mouth daily. ALONG WITH 60MG DOSE, TOTAL 90MG DAILY              DULoxetine (CYMBALTA) 60 MG capsule Take 1 capsule by mouth daily. ALONG WITH THE 30MG FOR A TOTAL DAILY DOSE OF 90MG  1     empagliflozin (JARDIANCE) 10 mg Tab Take 1 tablet (10 mg total) by mouth daily. 30 tablet 1     furosemide (LASIX) 40 MG tablet  Take 1 tablet (40 mg total) by mouth daily as needed (swelling). 20 tablet 0     glipiZIDE (GLUCOTROL XL) 10 MG 24 hr tablet Take 1 tablet (10 mg total) by mouth daily. 30 minutes before eating 30 tablet 1     HYDROcodone-acetaminophen 5-325 mg per tablet Take 1 tablet by mouth 3 (three) times a day. 90 tablet 0     lisinopril (PRINIVIL,ZESTRIL) 40 MG tablet Take 1 tablet by mouth daily.             metoprolol tartrate (LOPRESSOR) 100 MG tablet Take 100 mg by mouth 2 (two) times a day.       polyvinyl alcohol (LIQUIFILM TEARS) 1.4 % ophthalmic solution Administer 1 drop to both eyes as needed for dry eyes.       QUEtiapine (SEROQUEL) 100 MG tablet Take 100 mg by mouth at bedtime.       QUEtiapine (SEROQUEL) 25 MG tablet Take 25 mg by mouth 3 (three) times a day. Morning, afternoon and evening       rivaroxaban 20 mg Tab Take 20 mg by mouth daily with supper.        spironolactone (ALDACTONE) 25 MG tablet Take 25 mg by mouth daily.       tiZANidine (ZANAFLEX) 4 MG tablet Take 1 tablet (4 mg total) by mouth 2 (two) times a day as needed. 30 tablet 1     No current facility-administered medications for this visit.

## 2021-06-01 NOTE — TELEPHONE ENCOUNTER
Currently waiting to hear back from Dr. Lechuga on his thoughts regarding insulin start. Will call pt to discuss plan after discussing w/ Dr. Lechuga.

## 2021-06-01 NOTE — PROGRESS NOTES
"Assessment: pt seen today for f/u.  He had pro tanya placed on 9/9, he brings sensor in today which was uploaded. He has the full 14 days of data from 9/9/19-9/23/19. However, he had an injection on 9/17/19 which really skews the average as his BG were significantly increased afterwards. Pt states he forgot his food records but admits he didn't write much on there anyways. Pt reports he usually wakes around 5:30am and eats breakfast around 6:30am. Pt states he does not really eat \"meals\" throughout the day but more like \"a bunch of small meals/snacks throughout the day.\"     BG avg while wearing sensor: 191. No lows noted, pt reports he does not have low BG.   He is taking metformin 1g two times a day and glipizide 10 mg/day. He was taking trulicity but that was stopped prior to the CGM being placed. Pt states he had a 1 month free trial, it did not seem to improve his BG so he did not want to have to pay for it. He also has Jardiance on his med list which he states he never took because it was $100/month.   Pt asks if he can just start insulin. He states he is tired of feeling tired from high BG. However, he is worried about cost. Advised pt will discuss with Dr. Lechuga regarding the insulin. We could NPH for $25/vial. Pt states this is something he is interested in. Demonstrated use of syringe and vial and injection technique. Discussed sites and storage of insulin. Reviewed signs and symptoms of hypoglycemia and treatment.       Plan: pending discussing w/ Dr. Lechuga regarding medication plan moving forward. Will call pt to discuss. Pt will f/u 10/25 as scheduled.     Subjective and Objective:      bOi Messer is referred by Dr. Lechuga for Diabetes Education.     Lab Results   Component Value Date    HGBA1C 8.8 (H) 06/25/2019       Follow up:   1025/19 as scheduled       Education:     Monitoring   Meter (per above goals): Discussed  Monitoring: Assessed and Discussed  BG goals: Discussed    Nutrition " Management  Nutrition Management: Assessed and Discussed  Weight: Not addressed  Portions/Balance: Assessed and Discussed  Carb ID/Count: Assessed and Discussed  Label Reading: Not addressed  Heart Healthy Fats: Not addressed  Menu Planning: Assessed and Discussed  Dining Out: Needs instruction/review at follow-up  Physical Activity: Assessed, Discussed and using a cane today, walking very slowly.   Medications: Discussed  Orals: Discussed  Injected Medications: Discussed   Storage/Exp:Discussed   Site Rotation: Discussed   Sites Assessed: no    Diabetes Disease Process: Discussed    Acute Complications: Prevent, Detect, Treat:  Hypoglycemia: Assessed and Discussed  Hyperglycemia: Assessed and Discussed  Sick Days: Discussed  Driving: Needs instruction/review at follow-up    Chronic Complications  Foot Care:Needs instruction/review at follow-up  Skin Care: Needs instruction/review at follow-up  Eye: Needs instruction/review at follow-up  ABC: Assessed and Discussed  Teeth:Needs instruction/review at follow-up  Goal Setting and Problem Solving: Assessed and Discussed  Barriers: Assessed and Discussed  Psychosocial Adjustments: Assessed and Discussed      Time spent with the patient: 30 minutes for diabetes education and counseling.   Previous Education: yes  Visit Type:RYNE White  9/27/2019

## 2021-06-01 NOTE — TELEPHONE ENCOUNTER
Refill Approved    Rx renewed per Medication Renewal Policy. Medication was last renewed on 7/12/19.    Blanca [facility], [facility] Triage/Med Refill 9/19/2019     Requested Prescriptions   Pending Prescriptions Disp Refills     furosemide (LASIX) 40 MG tablet [Pharmacy Med Name: FUROSEMIDE 40MG TABLETS] 90 tablet 0     Sig: TAKE 1 TABLET BY MOUTH DAILY       Diuretics/Combination Diuretics Refill Protocol  Passed - 9/19/2019 12:29 PM        Passed - Visit with PCP or prescribing provider visit in past 12 months     Last office visit with prescriber/PCP: 8/28/2019 Enio Lechuga MD OR same dept: 8/28/2019 Enio Lechuga MD OR same specialty: 8/28/2019 Enio Lechuga MD  Last physical: Visit date not found Last MTM visit: Visit date not found   Next visit within 3 mo: Visit date not found  Next physical within 3 mo: Visit date not found  Prescriber OR PCP: Enio Lechuga MD  Last diagnosis associated with med order: 1. Edema, unspecified type  - furosemide (LASIX) 40 MG tablet [Pharmacy Med Name: FUROSEMIDE 40MG TABLETS]; TAKE 1 TABLET BY MOUTH DAILY  Dispense: 90 tablet; Refill: 0    If protocol passes may refill for 12 months if within 3 months of last provider visit (or a total of 15 months).             Passed - Serum Potassium in past 12 months      Lab Results   Component Value Date    Potassium 4.4 06/25/2019             Passed - Serum Sodium in past 12 months      Lab Results   Component Value Date    Sodium 132 (L) 06/25/2019             Passed - Blood pressure on file in past 12 months     BP Readings from Last 1 Encounters:   09/09/19 136/83             Passed - Serum Creatinine in past 12 months      Creatinine   Date Value Ref Range Status   06/25/2019 1.51 (H) 0.70 - 1.30 mg/dL Final

## 2021-06-01 NOTE — TELEPHONE ENCOUNTER
Zakiya / diabetes educator    When he starts the insulin, does he stop the other diabetes medication, such as the metformin and the glipizide, etc.    He can be reached @ [phone]

## 2021-06-01 NOTE — PATIENT INSTRUCTIONS - HE
Continue trulicity - see how much this costs at the pharmacy, if reasonable let's continue     Start glipizide 10mg every morning before eating (this will help us understand if you are still making insulin or not)     Let's see the cost of Jardiance 10mg daily, we may consider this if Trulicity is too expensive     All of this will be captured on the continuous glucose monitor     Please talk with psychiatrist about increasing duloxetine to help with mood and pain

## 2021-06-01 NOTE — PROGRESS NOTES
Assessment & Plan:   Obi has had type 2 diabetes since 2011.  He is currently taking 1000 mg metformin daily, 1.5 mg Trulicity once weekly and 10 mg glipizide daily.   The referral is for pro Freestyle Gloria continuous glucose senosr.   It was placed backside of upper left arm.  [id number]   Lot:  [id number]           Requested that patient keep food/medication/ and activity log while wearing sensor -  form was provided today.   Patient instructed to bring in sensor if it should fall off before scheduled follow up; ok to remove on 9/23/2019 and bring in sensor to appt.    Take and record all medications as prescribed.    Follow up scheduled with Zakiya on 9/27/2019.

## 2021-06-02 NOTE — TELEPHONE ENCOUNTER
Left a message for the patient to call the clinic to discuss us results,  Both US venous insufficiency and US ABIs are normal

## 2021-06-02 NOTE — TELEPHONE ENCOUNTER
RN cannot approve Refill Request    RN can NOT refill this medication med is not covered by policy/route to provider. Last office visit: Visit date not found Last Physical: Visit date not found Last MTM visit: Visit date not found Last visit same specialty: 8/28/2019 Enio Lechuga MD.  Next visit within 3 mo: Visit date not found  Next physical within 3 mo: Visit date not found      Kathryn Seymour, [facility] Triage/Med Refill 10/29/2019    Requested Prescriptions   Pending Prescriptions Disp Refills     tiZANidine (ZANAFLEX) 4 MG tablet [Pharmacy Med Name: TIZANIDINE 4MG TABLETS] 30 tablet 0     Sig: TAKE 1 TABLET(4 MG) BY MOUTH EVERY 8 HOURS AS NEEDED       There is no refill protocol information for this order

## 2021-06-03 VITALS
SYSTOLIC BLOOD PRESSURE: 136 MMHG | WEIGHT: 284.7 LBS | HEART RATE: 78 BPM | DIASTOLIC BLOOD PRESSURE: 83 MMHG | TEMPERATURE: 98.5 F | OXYGEN SATURATION: 95 % | HEIGHT: 68 IN | BODY MASS INDEX: 43.15 KG/M2

## 2021-06-03 VITALS — BODY MASS INDEX: 44.25 KG/M2 | HEIGHT: 68 IN | WEIGHT: 292 LBS

## 2021-06-03 VITALS — BODY MASS INDEX: 44.25 KG/M2 | WEIGHT: 292 LBS | HEIGHT: 68 IN

## 2021-06-03 VITALS — BODY MASS INDEX: 44.85 KG/M2 | WEIGHT: 295 LBS

## 2021-06-03 VITALS — BODY MASS INDEX: 42.89 KG/M2 | HEIGHT: 68 IN | WEIGHT: 283 LBS

## 2021-06-03 VITALS — HEIGHT: 68 IN | WEIGHT: 293 LBS | BODY MASS INDEX: 44.41 KG/M2

## 2021-06-03 VITALS — WEIGHT: 294 LBS | HEIGHT: 68 IN | BODY MASS INDEX: 44.56 KG/M2

## 2021-06-03 NOTE — TELEPHONE ENCOUNTER
RN cannot approve Refill Request    RN can NOT refill this medication med is not covered by policy/route to provider. Last office visit: 8/28/2019 Enio Lechuga MD Last Physical: Visit date not found Last MTM visit: Visit date not found Last visit same specialty: 8/28/2019 Enio Lechuga MD.  Next visit within 3 mo: Visit date not found  Next physical within 3 mo: Visit date not found      Swathi Hook, [facility] Triage/Med Refill 11/19/2019    Requested Prescriptions   Pending Prescriptions Disp Refills     tiZANidine (ZANAFLEX) 4 MG tablet [Pharmacy Med Name: TIZANIDINE 4MG TABLETS] 30 tablet 0     Sig: TAKE 1 TABLET(4 MG) BY MOUTH EVERY 8 HOURS AS NEEDED       There is no refill protocol information for this order

## 2021-06-03 NOTE — TELEPHONE ENCOUNTER
Ok to restart his glipizide 10 mg daily until I see him. Have his checked his blood sugars and record for my review.

## 2021-06-03 NOTE — TELEPHONE ENCOUNTER
Called re: upcoming [employer] study visit.  Confirmed he does not feel he is able to bike (as required for Group 5) at this time.  Will cancel his appointment.  He was encouraged to consider calling to schedule again if he right knee feels better.    Bing Hernandez RN, BSN  Clinical Trials Nurse

## 2021-06-03 NOTE — TELEPHONE ENCOUNTER
Okay with me to PA his blood sugar check 3 times a day.  But he needs follow-up with our diabetic educator.  Was supposed to see our diabetic educator for follow-up in September which he did not follow through.  Also he is due for follow-up with me.  Please notify patient.

## 2021-06-03 NOTE — PROGRESS NOTES
"Assessment: pt seen today for f/u. He brings in BG readings on his phone. Pt explains he is frustrated that DM are not coming down. He states he has been increasing his insulin at home, on his own, pt states \"my body, my rules.\" Discussed for safety reasons to always call to discuss these things. He reports he is currently taking NPH 20 units two times a day, morning and night, he reports he has been at this dose for about 2 weeks. He has not had any low BG readings. Most current readings:   FBG; 171, 298, 332, 245, 341, 340, 218, 255, 304, 280, 256, 238, 186, 408  3pm: 252, 224, 192  7pm: 211, 247, 390  9pm: 248    Pt denies any significant changes to diet. He states his hip and knee are feeling a little better so maybe he could do a little walking, then states it is cold out so he probably won't. Encouraged pt to try to increase daily activity and discussed benefits. Pt is frustrated over BG control. He states he is a  and is worried about his vision. Discussed the possibility of restarting glipizide but would need to talk with Dr. Lechuga first. Pt states at this point he would like to just try to stick with the insulin and work on increasing it to goal.       Plan: will increase NPH to 25 units two times a day. To check BG two times a day, fasting and evening time. Pt instructed he may increase evening dose by 5 units every 3 days until FBG are in the 120-150 range. Instructed he may increase morning dose by 5 units every 3 days until evening BG are in the 120-150 range. This was all written down for pt and he verbalized understanding. Pt will f/u with Dr. Lechuga 11/27/19 and here 12/13/19. He agrees to call sooner w/ any concerns.     Subjective and Objective:      Obi Messer is referred by Dr. Lechuga for Diabetes Education.     Lab Results   Component Value Date    HGBA1C 8.8 (H) 06/25/2019         Current diabetes medications:  Metformin 1g two times a day, NPH 25 units two times a day "         Education:     Monitoring   Meter (per above goals): Discussed  Monitoring: Assessed and Discussed  BG goals: Discussed and Literature provided    Nutrition Management  Nutrition Management: Assessed and Discussed  Weight: Discussed  Portions/Balance: Assessed and Discussed  Carb ID/Count: Assessed and Discussed  Label Reading: Not addressed  Heart Healthy Fats: Not addressed  Menu Planning: Assessed and Discussed  Dining Out: Assessed and Discussed  Physical Activity: Assessed and Discussed  Medications: Discussed  Orals: Discussed  Injected Medications: Discussed   Storage/Exp:Discussed   Site Rotation: Discussed   Sites Assessed: no    Diabetes Disease Process: Discussed    Acute Complications: Prevent, Detect, Treat:  Hypoglycemia: Assessed and Discussed  Hyperglycemia: Assessed and Discussed  Sick Days: Discussed  Driving: Not addressed    Chronic Complications  Foot Care:Not addressed  Skin Care: Not addressed  Eye: Discussed  ABC: Assessed and Discussed  Teeth:Not addressed  Goal Setting and Problem Solving: Assessed and Discussed  Barriers: Assessed and Discussed  Psychosocial Adjustments: Assessed and Discussed      Time spent with the patient: 30 minutes for diabetes education and counseling.   Previous Education: yes  Visit Type:RYNE White  11/15/2019

## 2021-06-03 NOTE — TELEPHONE ENCOUNTER
Omid George to start prior authorization for the patient to test his blood sugar three times a day?  Please advise.  Thank you.  Samantha ANDUJAR CMA/DEVENDRA....................11:57 AM

## 2021-06-04 VITALS
BODY MASS INDEX: 41.36 KG/M2 | WEIGHT: 272 LBS | HEART RATE: 66 BPM | OXYGEN SATURATION: 97 % | DIASTOLIC BLOOD PRESSURE: 62 MMHG | SYSTOLIC BLOOD PRESSURE: 108 MMHG

## 2021-06-04 VITALS
HEART RATE: 75 BPM | SYSTOLIC BLOOD PRESSURE: 104 MMHG | HEIGHT: 68 IN | WEIGHT: 293 LBS | OXYGEN SATURATION: 96 % | DIASTOLIC BLOOD PRESSURE: 60 MMHG | BODY MASS INDEX: 44.41 KG/M2

## 2021-06-04 NOTE — TELEPHONE ENCOUNTER
"Standing INR order placed. Pedro Luis was given instructions by PCP today in OV \"Has chronic atrial fibrillation.  Stopped Xarelto a month ago because of cost.  Wants to go back to warfarin.  Will restart warfarin 5 mg daily for 3 days.  Check INR today and recheck INR in 3 days.  Will refer to our warfarin clinic for monitoring of his INR.\" INR was 1.01 today. No call made as given instructions in OV today by PCP.    INR is scheduled on 12/27. Will follow up with patient at that time.  "

## 2021-06-04 NOTE — TELEPHONE ENCOUNTER
ANTICOAGULATION  MANAGEMENT    Assessment     Today's INR result of 2.30 is Therapeutic (goal INR of 2.0-3.0)        Warfarin taken as previously instructed    - recently switched back from Xarelto to Warfarin, due to cost; ($145 per month / copay)   - has been on long-term anticoagulation with warfarin therapy since 2013 for chronic atrial fibrillation.    No new diet changes affecting INR    No new medication/supplements affecting INR    Continues to tolerate warfarin with no reported s/s of bleeding or thromboembolism     Previous INR was Subtherapeutic at 1.01 on 12/24/19.    Currently had no specific questions with warfarin, since he has been on long-term anticoagulation.  Reported doing well with no new concerns or complaints.    Plan:     Spoke with Pedro Luis regarding INR result and instructed:     Warfarin Dosing Instructions:   (has 5mg tabs)   - Continue current warfarin dose 5 mg daily.    Instructed patient to follow up no later than:  One wk.   - scheduled on 1/15/2020 @ [facility].    Education provided: importance of consistent vitamin K intake and target INR goal and significance of current INR result    Pedro Luis verbalizes understanding and agrees to warfarin dosing plan.    Instructed to call the [facility] for any changes, questions or concerns. (#[phone])   ?   Christelle Huerta RN    Subjective/Objective:      Obi Messer, a 64 y.o. male is on warfarin.     Obi reports:     Home warfarin dose: as updated on anticoagulation calendar per template     Missed doses: No     Medication changes:  No     S/S of bleeding or thromboembolism:  No     New Injury or illness:  No     Changes in diet or alcohol consumption:  No     Upcoming surgery, procedure or cardioversion:  No    Anticoagulation Episode Summary     Current INR goal:   2.0-3.0   TTR:   86.8 % (3 d)   Next INR check:   1/13/2020   INR from last check:   2.30 (1/6/2020)   Weekly max warfarin dose:      Target end date:      INR check location:       Preferred lab:      Send INR reminders to:   [facility]    Indications    Chronic atrial fibrillation [I48.20]           Comments:            Anticoagulation Care Providers     Provider Role Specialty Phone number    Enio Lechuga MD Referring Internal Medicine [phone]

## 2021-06-04 NOTE — TELEPHONE ENCOUNTER
Patient is a new start to warfarin and overdue for an INR. Left message for him to call ACN back asap. Please transfer to backline so he can be scheduled for an INR and speak with ACN

## 2021-06-04 NOTE — PROGRESS NOTES
Office Visit - Follow Up   Obi Messer   64 y.o. male    Date of Visit: 12/24/2019    Chief Complaint   Patient presents with     Follow-up     fasting, saw diabetic educator multiple times since last visit, still having dizzy spells occasionally when standing up        Assessment and Plan   1. Chronic atrial fibrillation  Has chronic atrial fibrillation.  Stopped Xarelto a month ago because of cost.  Wants to go back to warfarin.  Will restart warfarin 5 mg daily for 3 days.  Check INR today and recheck INR in 3 days.  Will refer to our warfarin clinic for monitoring of his INR.  - Ambulatory referral to Anticoagulation Monitoring  - warfarin ANTICOAGULANT (COUMADIN) 5 MG tablet; Take 1 tablet (5 mg total) by mouth daily.  Dispense: 30 tablet; Refill: 11  - INR  - INR; Future    2. Type 2 diabetes mellitus without complication, with long-term current use of insulin (H)  Reports his blood sugars better controlled with increase glipizide to 20 mg twice a day together with his insulin NPH, 15 units in the morning and 10 units at night.  Recheck A1c today.  Check BMP.  - glipiZIDE (GLUCOTROL XL) 10 MG 24 hr tablet; TAKE 2 TABLETS TWICE A DAY. TAKE 30 MINUTES BEFORE EATING  Dispense: 120 tablet; Refill: 3  - Glycosylated Hemoglobin A1c  - Basic Metabolic Panel    3. Essential hypertension  Controlled.  Continue spironolactone, metoprolol, and lisinopril.  Check BMP and CBC.  - HM2(CBC w/o Differential)  - Basic Metabolic Panel    4. Mixed hyperlipidemia  Last lipids were good on 6/25/2019 showing LDL 45, HDL 38, triglycerides 108 and total cholesterol 105..  Continue atorvastatin.  Check fasting lipids, TSH and liver function.  - Lipid Cascade  - Thyroid Stimulating Hormone (TSH)  - Hepatic Profile    5. Coronary artery disease due to lipid rich plaque, s/p stent LAD and OM, 2013  Doing well with his coronary artery disease.  Denies chest pains and shortness of breath.    6. Chronic combined systolic and diastolic  congestive heart failure (H)  Compensated.  Denies shortness of breath, easy fatigability, exertional dyspnea and leg edema    7. Recurrent major depressive disorder, in full remission (H)  Has recurring major depression.  Reports he is in full remission.  ACT score still at 9.  Has significant anxiety.  Used to take alprazolam prescribed by his psychiatrist at [facility].  But  his psychiatrist stopped giving this to him because of his age.  Was continued on his Seroquel and Sinequan.  Wants to see another psychiatrist.  Will refer to our psychiatry.  - Ambulatory referral to Psychiatry    8. Muscle spasm  Has recurring muscle spasm for which he takes tizanidine.  Wants to continue with it.  Okay to get refill.  - tiZANidine (ZANAFLEX) 4 MG tablet; Take 1 tablet (4 mg total) by mouth 2 (two) times a day as needed.  Dispense: 30 tablet; Refill: 1    9. Chronic fatigue  Has chronic fatigue.  Afraid he has low testosterone.  Wants to be tested for his testosterone.  Will check testosterone total and free.  - Testosterone, Total and Free    10. Need for immunization against influenza  Flu shot was given.  - Influenza High Dose, Seasonal 65+ yrs    The following high BMI interventions were performed this visit: encouragement to exercise, weight monitoring and prescribed dietary intake      Follow up in 3 months.     History of Present Illness   This 64 y.o. old male is here for follow-up.  Has diabetes.  Was seen a few times by our diabetic educator.  Wants to do his insulin dosing and is oral diabetic medication on his own.  Does not follow the diabetic educator's recommendations.  Now takes NPH insulin 15 units in the morning and 10 units at night with glipizide 20 mg twice a day.  Reports his blood sugar is trending down.  Shows me his blood sugar readings and they are in the high 100s to mid 200s still.  Needs recheck of his A1c to gauge his diabetes control.  Has hypertension hyperlipidemia controlled by  his medication.  Has coronary artery disease status post stenting.  Doing well with this.  Denies chest pain or shortness of breath.  Has compensated chronic systolic and diastolic heart failure.  Does not have increased shortness of breath, leg edema, exertional dyspnea and easy fatigue.  But complains of overall chronic fatigue which he is concerned due to possible low testosterone.  Overall, feels well.  No other complaints.    Review of Systems   A 12 point comprehensive review of systems was negative except as noted..     Medications, Allergies and Problem List   Reviewed and updated             Chief Complaint   Follow-up (fasting, saw diabetic educator multiple times since last visit, still having dizzy spells occasionally when standing up)       Patient Profile   Social History     Social History Narrative     in May, 2018. 3 grown children, 2 sons and 1 daughter. 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 Nonsmoker. Socially drinks alcohol 2 times a month.         Past Medical History   Patient Active Problem List   Diagnosis     Rupture of right quadriceps muscle, subsequent encounter     Chronic combined systolic and diastolic congestive heart failure (H)     Coronary artery disease due to lipid rich plaque, s/p stent LAD and OM, 2013     Type 2 diabetes mellitus without complication, without long-term current use of insulin (H)     Essential hypertension     Mixed hyperlipidemia     Venous stasis dermatitis of right lower extremity     Recurrent major depressive disorder, in full remission (H)     Chronic atrial fibrillation       Past Surgical History  He has a past surgical history that includes Cholecystectomy; LEFT TOTAL KNEE ARTHROPLASTY  (2016); Tonsilectomy, adenoidectomy, bilateral myringotomy and tubes (AGE 11); CERVICAL 5 (2004); Replacement total knee (Right); and Quadriceps tendon repair (Right, 12/5/2017).       Medications and Allergies   Current Outpatient Medications   Medication Sig  "    ACCU-CHEK ALIYAH PLUS METER Misc checking once per day     acetaminophen (TYLENOL) 500 MG tablet Take 1,000 mg by mouth every 6 (six) hours as needed for pain.     atorvastatin (LIPITOR) 40 MG tablet Take 40 mg by mouth at bedtime.     blood glucose test strips Use 1 each As Directed daily. Dispense brand per patient's insurance at pharmacy discretion.     digoxin (LANOXIN) 250 mcg tablet Take 1 tablet (250 mcg total) by mouth daily.     doxepin (SINEQUAN) 10 MG capsule Take 1 capsule by mouth as needed.     DULoxetine (CYMBALTA) 30 MG capsule Take 30 mg by mouth daily. ALONG WITH 60MG DOSE, TOTAL 90MG DAILY            DULoxetine (CYMBALTA) 60 MG capsule Take 1 capsule by mouth daily. ALONG WITH THE 30MG FOR A TOTAL DAILY DOSE OF 90MG     furosemide (LASIX) 40 MG tablet TAKE 1 TABLET BY MOUTH DAILY     glipiZIDE (GLUCOTROL XL) 10 MG 24 hr tablet TAKE 2 TABLETS TWICE A DAY. TAKE 30 MINUTES BEFORE EATING     HYDROcodone-acetaminophen 5-325 mg per tablet Take 1 tablet by mouth 3 (three) times a day.     insulin NPH (NOVOLIN N NPH U-100 INSULIN) 100 unit/mL injection Inject 25 units twice per day, (total 50 units per day), increase as instructed, up to 100 units per day. (Patient taking differently: Inject 10 units in the AM, 15 in the pm)     insulin syringe-needle U-100 (BD INSULIN SYRINGE ULTRA-FINE) 1/2 mL 31 gauge x 15/64\" Syrg Use 1 each As Directed 2 (two) times a day.     insulin syringe-needle U-100 0.3 mL 31 gauge x 15/64\" Syrg Use 1 each As Directed daily.     lisinopril (PRINIVIL,ZESTRIL) 40 MG tablet Take 1 tablet by mouth daily.           metFORMIN (GLUCOPHAGE-XR) 500 MG 24 hr tablet Take 2 tablets by mouth daily.           metoprolol tartrate (LOPRESSOR) 100 MG tablet Take 100 mg by mouth 2 (two) times a day.     polyvinyl alcohol (LIQUIFILM TEARS) 1.4 % ophthalmic solution Administer 1 drop to both eyes as needed for dry eyes.     QUEtiapine (SEROQUEL) 100 MG tablet Take 100 mg by mouth at bedtime. " "    QUEtiapine (SEROQUEL) 25 MG tablet Take 25 mg by mouth 3 (three) times a day. Morning, afternoon and evening     spironolactone (ALDACTONE) 25 MG tablet Take 25 mg by mouth daily.     tiZANidine (ZANAFLEX) 4 MG tablet Take 1 tablet (4 mg total) by mouth 2 (two) times a day as needed.     warfarin ANTICOAGULANT (COUMADIN) 5 MG tablet Take 1 tablet (5 mg total) by mouth daily.     No Known Allergies     Family and Social History   Family History   Problem Relation Age of Onset     Heart disease Father         Social History     Tobacco Use     Smoking status: Former Smoker     Last attempt to quit: 2014     Years since quittin.9     Smokeless tobacco: Current User   Substance Use Topics     Alcohol use: Yes     Comment: 2/week     Drug use: No        Physical Exam       Physical Exam  /60   Pulse 75   Ht 5' 8\" (1.727 m)   Wt (!) 293 lb (132.9 kg)   SpO2 96%   BMI 44.55 kg/m    General appearance: alert, appears stated age, cooperative, no distress and morbidly obese  Head: Normocephalic, without obvious abnormality, atraumatic  Throat: lips, mucosa, and tongue normal; teeth and gums normal  Neck: no adenopathy, no carotid bruit, no JVD, supple, symmetrical, trachea midline and thyroid not enlarged, symmetric, no tenderness/mass/nodules  Lungs: clear to auscultation bilaterally  Heart: irregularly irregular rhythm and no murmurs  Abdomen: soft, non-tender; bowel sounds normal; no masses,  no organomegaly  Extremities: extremities normal, atraumatic, no cyanosis or edema  Skin: Skin color, texture, turgor normal. No rashes or lesions  Neurologic: Grossly normal  Psychiatric: Flat affect A1c from normal mood,     Additional Information   Post Discharge Medication Reconciliation Status: discharge medications reconciled, continue medications without change      Enio Lechuga MD  Internal Medicine  Contact me at [phone]     Additional Information   Current Outpatient Medications " "  Medication Sig     ACCU-CHEK ALIYAH PLUS METER Misc checking once per day     acetaminophen (TYLENOL) 500 MG tablet Take 1,000 mg by mouth every 6 (six) hours as needed for pain.     atorvastatin (LIPITOR) 40 MG tablet Take 40 mg by mouth at bedtime.     blood glucose test strips Use 1 each As Directed daily. Dispense brand per patient's insurance at pharmacy discretion.     digoxin (LANOXIN) 250 mcg tablet Take 1 tablet (250 mcg total) by mouth daily.     doxepin (SINEQUAN) 10 MG capsule Take 1 capsule by mouth as needed.     DULoxetine (CYMBALTA) 30 MG capsule Take 30 mg by mouth daily. ALONG WITH 60MG DOSE, TOTAL 90MG DAILY            DULoxetine (CYMBALTA) 60 MG capsule Take 1 capsule by mouth daily. ALONG WITH THE 30MG FOR A TOTAL DAILY DOSE OF 90MG     furosemide (LASIX) 40 MG tablet TAKE 1 TABLET BY MOUTH DAILY     glipiZIDE (GLUCOTROL XL) 10 MG 24 hr tablet TAKE 2 TABLETS TWICE A DAY. TAKE 30 MINUTES BEFORE EATING     HYDROcodone-acetaminophen 5-325 mg per tablet Take 1 tablet by mouth 3 (three) times a day.     insulin NPH (NOVOLIN N NPH U-100 INSULIN) 100 unit/mL injection Inject 25 units twice per day, (total 50 units per day), increase as instructed, up to 100 units per day. (Patient taking differently: Inject 10 units in the AM, 15 in the pm)     insulin syringe-needle U-100 (BD INSULIN SYRINGE ULTRA-FINE) 1/2 mL 31 gauge x 15/64\" Syrg Use 1 each As Directed 2 (two) times a day.     insulin syringe-needle U-100 0.3 mL 31 gauge x 15/64\" Syrg Use 1 each As Directed daily.     lisinopril (PRINIVIL,ZESTRIL) 40 MG tablet Take 1 tablet by mouth daily.           metFORMIN (GLUCOPHAGE-XR) 500 MG 24 hr tablet Take 2 tablets by mouth daily.           metoprolol tartrate (LOPRESSOR) 100 MG tablet Take 100 mg by mouth 2 (two) times a day.     polyvinyl alcohol (LIQUIFILM TEARS) 1.4 % ophthalmic solution Administer 1 drop to both eyes as needed for dry eyes.     QUEtiapine (SEROQUEL) 100 MG tablet Take 100 mg by " mouth at bedtime.     QUEtiapine (SEROQUEL) 25 MG tablet Take 25 mg by mouth 3 (three) times a day. Morning, afternoon and evening     spironolactone (ALDACTONE) 25 MG tablet Take 25 mg by mouth daily.     tiZANidine (ZANAFLEX) 4 MG tablet Take 1 tablet (4 mg total) by mouth 2 (two) times a day as needed.     warfarin ANTICOAGULANT (COUMADIN) 5 MG tablet Take 1 tablet (5 mg total) by mouth daily.     No Known Allergies  Social History     Tobacco Use     Smoking status: Former Smoker     Last attempt to quit: 2014     Years since quittin.9     Smokeless tobacco: Current User   Substance Use Topics     Alcohol use: Yes     Comment: 2/week     Drug use: No         Time: total time spent with the patient was 40 minutes of which >50% was spent in counseling and coordination of care specifically regarding his diabetes for which he does not follow the diabetic educator recommendations.  Also discussed at length his fatigue which is due to his depression and anxiety but also due to his multiple medical problems.  Doubt he has low testosterone causing his fatigue.  Informed I could not represcribe his alprazolam for his anxiety because this was stopped by his psychiatrist and  I will refer him to our psychiatrist for management of his depression anxiety which he prefers to see now another psychiatrist.

## 2021-06-04 NOTE — PROGRESS NOTES
Assessment: pt seen today for f/u. He brings in BG readings on his phone. Most recent readings:   FB, 163, 221, 221, 251, 278, 199, 332, 271, 196, 218, 331, 283  Before dinner: 183, 191, 164, 167, 122  After dinner: 235    Pt denies any s/s of low BG. At our last visit 11/15 he was taking NPH 25 units two times a day and then he restarted glipizide 10 mg/day around . Today, he informs he has increased his glipizide to 20 mg/day and is taking 15 units two times a day of NPH. We have discussed in the past he should be discussing with provider before making these medication changes himself. Pt does not seem to agree with this. He is pleased with his current BG readings as they are improving.   Reviewed signs and symptoms of hypoglycemia and treatment.   Discussed diet - pt reports no changes.   Discussed exercise - pt reports no changes, still reports his knee and hip are doing better but has not been working on increasing activity.   Would recommend he increase his NPH as his BG are still above goal. However, pt does not want to do this today. He wants to come off his insulin. However, alt medications were not affordable for him. He agrees he will not just up and quit the insulin but does not want to increase it any further.     Plan: encouraged pt to call prior to making medication changes, to continue to monitor BG and call if any significant changes to patterns, continue to work on increasing activity.     Subjective and Objective:      Obi Messer is referred by Dr. Lechuga for Diabetes Education.     Lab Results   Component Value Date    HGBA1C 8.8 (H) 2019         Current diabetes medications:  Metformin 2g/day, glipizide 20mg/day, NPH 15 units two times a day       Follow up:   19 - with Ankush   20 - here       Education:     Monitoring   Meter (per above goals): Discussed  Monitoring: Assessed and Discussed  BG goals: Discussed    Nutrition Management  Nutrition Management:  Assessed and Discussed  Weight: Not addressed  Portions/Balance: Assessed and Discussed  Carb ID/Count: Assessed and Discussed  Label Reading: Not addressed  Heart Healthy Fats: Not addressed  Menu Planning: Assessed and Discussed  Dining Out: Assessed and Discussed  Physical Activity: Assessed and Discussed  Medications: Discussed  Orals: Discussed  Injected Medications: Discussed   Storage/Exp:Not addressed   Site Rotation: Discussed   Sites Assessed: no    Diabetes Disease Process: Discussed    Acute Complications: Prevent, Detect, Treat:  Hypoglycemia: Assessed and Discussed  Hyperglycemia: Assessed and Discussed  Sick Days: Not addressed  Driving: Not addressed    Chronic Complications  Foot Care:Not addressed  Skin Care: Not addressed  Eye: Not addressed  ABC: Assessed and Discussed  Teeth:Not addressed  Goal Setting and Problem Solving: Assessed and Discussed  Barriers: Assessed and Discussed  Psychosocial Adjustments: Assessed and Discussed      Time spent with the patient: 30 minutes for diabetes education and counseling.   Previous Education: yes  Visit Type:RYNE White  12/13/2019

## 2021-06-04 NOTE — TELEPHONE ENCOUNTER
Left message to call back for: lab results  Information to relay to patient: You still have uncontrolled diabetes, A1c of 9.6.  You also have low testosterone both total and free testosterone which account for your chronic fatigue..  I like you to see our Pharm.D.  for your diabetes and low testosterone.  I will make appointment for you. Rest of your labs are normal.  Continue same medications for now. Lab letter mailed to patient.     Veronika Guerrero LPN

## 2021-06-04 NOTE — TELEPHONE ENCOUNTER
Pharm D will discuss with him treatment for low testosterone. This is is the reason he will see Milana for discussion and treatment.

## 2021-06-04 NOTE — PROGRESS NOTES
MTM Initial Encounter  Assessment & Plan                                                     Fatigue/Low Testosterone: Needs improvement - Pt with documented low testosterone levels also experiencing fatigue, loss of body hair, and los of muscle mass. Pt currently using spironolactone which can have antiandrogenic effects. Before initiating testosterone, may benefit from alternatives for heart failure such as eplerenone. Guidelines recommend that testosterone therapy not be initiated until pt has had 3 consistently subnormal serum testosterone concentrations. Recommend discussing MRA with cardiology, then repeating test 2-4 weeks after change to assess for changes in levels. Reviewed potential side effects of testosterone therapy with patient including cardiovascular risk, potential for changes in mood, increase risk of clots. If pt is to start testosterone, would recommend topical for ease of application and consistent levels. Likely would require PA for coverage.   -Recommend stop Spironolactone or change to eplerenone (pt to discuss with Cardiology)   -Repeat levels in 2-4 weeks after any changes per cardiology   -Future Consideration: If proceeding with Testosterone replacement, consider topical testosterone 1.62% 2 pumps daily - titrate to 1-4 pump actuations based on predose morning serum testosterone concentration drawn after 14-28 days.          Diabetes: Needs improvement - A1C above goal <8% and actually worsened since June. Fasting sugars above goal , while prandial sugars are actually at goal range <180. Given elevated fasting sugars, may benefit from continued titration of Metformin, which would help also increase insulin sensitivity. Will titrate slowly to prevent adverse GI effects. Will send updated Rx for test strips, per pt request. Of note, pt is beyond the recommended max dose of Glipizide ER (recommended max is 20 mg daily). Given that pt is tolerating at this time and not having  hypoglycemia, likely okay to continue. Can revisit dose optimization at follow-up.   -Increase Metformin 500 mg ER to 2 tabs AM and 1 tab PM   -Refill test strips       Chronic Atrial Fibrillation/Hypertension/CHF/CAD: Needs improvement - BP at goal <130/80. Pulse at goal . Given pt taking Cartia 2-3 times per month, likely does not need. Pt reports using metoprolol tartrate. Guidelines recommend metoprolol succinate over metoprolol tartrate as preferred options. Finally, pt's EF has significantly improved, weights stable, and pt mostly asymptomatic. As discussed above, spironolactone can reduce testosterone levels. May be reasonable to have a discussion with cardiology to trial a hold of spironolactone or switch to eplerenone which lacks antiandrogenic effects. Finally, pt has a history of stent placement. On statin, with LDL at goal <70. Not currently using aspirin. Would be indicated even with anticoagulation.   -Recommend stop Cartia   -Recommend change from metoprolol tartrate to Metoprolol Succinate 200 mg daily.   -Recommend stop spironolactone or change to eplerenone   -Consider addition of aspirin 81 mg daily   -Pt to discuss above recommendations with Cardiology at upcoming follow-up. Recommendations were also faxed to Cardiology office.       Mood/Sleep: Improved - Despite PHQ9 above goal <5, patient reports mood symptoms are stable. Addition of testosterone could cause changes in mood/behaviour. Would want to ensure psychiatrist is aware of possible addition of Testosterone as noted above.   -Continue current therapy       Painful Legs Moving Toe Syndrome: Stable - finds this effective for symptom management.   -Continue current therapy         Follow Up  Return in about 3 weeks (around 1/27/2020) for Medication Management Pharmacist, Via Phone.      Subjective & Objective                                                     Obi Messer is a 64 y.o. male coming in for an initial visit for  Medication Therapy Management. He was referred to me from Enio Lechuga MD    Chief Complaint: Medication Management - interested in testosterone therapy     Medication Adherence/Access: Having problems with test strips. Spoke with [facility] on Friday - does not have test strips on file.       Fatigue/Low Testosterone: Patient with persistent fatigue, recently tested and found to have low Testosterone levels. Gets hot flashes and sweats. Loss of body hair, muscle mass. Libido is non-existent.  Sweating and hot flashes are the most bothersome.        Ref. Range 12/24/2019 11:55   Free Testosterone Calc Latest Ref Range: 4.7 - 24.4 ng/dL 3.54 (L)   Patient Fasting > 8hrs? Unknown Unknown   Sex Hormone Bind Globulin Latest Ref Range: 11 - 80 nmol/L 44   TESTOSTERONE, FREE AND TOTAL (BTEST) Unknown Rpt (!)   Testosterone, Total Latest Ref Range: 240 - 950 ng/dL 222 (L)         Lab Results   Component Value Date    WBC 13.2 (H) 12/24/2019    HGB 14.1 12/24/2019    HCT 43.2 12/24/2019    MCV 91 12/24/2019     12/24/2019     Lab Results   Component Value Date    CHOL 108 12/24/2019    CHOL 105 06/25/2019    CHOL 124 06/22/2016     Lab Results   Component Value Date    HDL 33 (L) 12/24/2019    HDL 38 (L) 06/25/2019    HDL 34 (L) 06/22/2016     Lab Results   Component Value Date    LDLCALC 51 12/24/2019    LDLCALC 45 06/25/2019    LDLCALC 60 06/22/2016     Lab Results   Component Value Date    TRIG 120 12/24/2019    TRIG 108 06/25/2019    TRIG 150 (H) 06/22/2016     No components found for: CHOLHDL      Diabetes:  Pt currently taking Glipizide 10 mg ER 2 tabs two times a day, Metformin 500 mg ER 2 tabs daily, Novolin NPH 10 units AM and 15 units PM. patient is not currently experiencing side effects. Has had GI symptoms in the past with metformin. Switching to ER formulation helped with the GI symptoms.     Has tried Trulicity in the past - cost was a big concern and didn't find it very effective.     Pt feels  he's very insensitive to insulin.      SMBG: twice daily    Ranges (patient reported) :     Fastin, 235, 300 (around the holidays), 194, 188, 256    Pre-Prandial: 159, 165, 151, 138,   Consistently under 250s.     Patient is not experiencing hypoglycemia   Recent symptoms of high blood sugar? Polyuria (but is on Furosemide), Polydipsia.     ACEi/ARB:  Lisinopril 40 mg daily   Statin: Atorvastatin 40 mg daily   Aspirin: Not taking        Lab Results   Component Value Date    HGBA1C 9.6 (H) 2019    HGBA1C 8.8 (H) 2019    HGBA1C 7.9 (H) 2016     Lab Results   Component Value Date    LDLCALC 51 2019    CREATININE 0.83 2019       Chronic Atrial Fibrillation/Hypertension/CHF/CAD: Prescribed Atorvastatin 40 mg daily, Digoxin 250 mcg daily, Metoprolol Tartrate 100 mg two times a day, Lisinopril 40 mg daily, Furosemide 40 mg daily, Spironolactone 25 mg daily, and warfarin 5 mg as instructed by [facility] team. Also prescribed Cartia 120 mg ER once daily - pt is using infrequently 2-3 days per month when BP gets high (above 150 systolic) will take a dose.    18 Echo: EF improved to 50-55% from 25% in 2012    Periodically has swelling in leg/ankles. Pt manages this with adjusting furosemide. Not checking daily weights. Checks weights a couple times per week. They've been steady - slightly less than 293# at home         BP Readings from Last 3 Encounters:   19 104/60   19 136/83   19 112/64     Pulse Readings from Last 3 Encounters:   19 75   19 78   19 89       Results for orders placed or performed in visit on 19   Basic Metabolic Panel   Result Value Ref Range    Sodium 138 136 - 145 mmol/L    Potassium 3.5 3.5 - 5.0 mmol/L    Chloride 104 98 - 107 mmol/L    CO2 20 (L) 22 - 31 mmol/L    Anion Gap, Calculation 14 5 - 18 mmol/L    Glucose 150 (H) 70 - 125 mg/dL    Calcium 9.7 8.5 - 10.5 mg/dL    BUN 11 8 - 22 mg/dL    Creatinine 0.83 0.70 -  "1.30 mg/dL    GFR MDRD Af Amer >60 >60 mL/min/1.73m2    GFR MDRD Non Af Amer >60 >60 mL/min/1.73m2         Mood/Sleep: Prescribed Duloxetine 30 and Duloxetine 60 mg daily,  Quetiapine 25 mg three times a day, Quetiapine 200 mg at bedtime. Being treated for depression. Mood has been okay- \"smooth sailing\" A little anxious, not aggressive.     PHQ-9 Total Score: 9 (2019 12:03 PM)      Painful Legs Moving Toe Syndrome: Tizanidine 4 mg two times a day as needed. Finds tizanidine very helpful.         PMH: reviewed in EPIC   Allergies/ADRs: reviewed in EPIC   Alcohol:   Social History     Substance and Sexual Activity   Alcohol Use Yes    Comment: 2/week       Tobacco:   Social History     Tobacco Use   Smoking Status Former Smoker     Last attempt to quit: 2014     Years since quittin.9   Smokeless Tobacco Current User     Today's Vitals: There were no vitals filed for this visit.  ----------------    Much or all of the text in this note was generated through the use of Dragon Dictate voice-to-text software. Errors in spelling or words which seem out of context are unintentional. Sound alike errors, in particular, may have escaped editing.    The patient was given a summary of these recommendations by mail    I spent 60 minutes with this patient today.   All changes were made via collaborative practice agreement with Enio Lechuga MD. A copy of the visit note was provided to the patient's provider.     Gildardo Sykes, KervinD  Medication Therapy Management (MTM) Pharmacist  [facility]           Current Outpatient Medications   Medication Sig Dispense Refill     acetaminophen (TYLENOL) 500 MG tablet Take 1,000 mg by mouth every 6 (six) hours as needed for pain.       atorvastatin (LIPITOR) 40 MG tablet Take 40 mg by mouth at bedtime.       blood glucose test strips Use 1 each As Directed 2 (two) times a day. Dispense brand per patient's insurance at pharmacy discretion. 100 strip 3 " "    digoxin (LANOXIN) 250 mcg tablet Take 1 tablet (250 mcg total) by mouth daily. 90 tablet 3     DULoxetine (CYMBALTA) 30 MG capsule Take 30 mg by mouth daily. ALONG WITH 60MG DOSE, TOTAL 90MG DAILY              DULoxetine (CYMBALTA) 60 MG capsule Take 1 capsule by mouth daily. ALONG WITH THE 30MG FOR A TOTAL DAILY DOSE OF 90MG  1     furosemide (LASIX) 40 MG tablet TAKE 1 TABLET BY MOUTH DAILY 90 tablet 2     glipiZIDE (GLUCOTROL XL) 10 MG 24 hr tablet TAKE 2 TABLETS BY MOUTH TWICE DAILY 30 MINUES BEFORE EATING 360 tablet 0     insulin NPH (NOVOLIN N NPH U-100 INSULIN) 100 unit/mL injection Inject 25 units twice per day, (total 50 units per day), increase as instructed, up to 100 units per day. (Patient taking differently: Inject 10 units in the AM, 15 in the pm) 20 mL 1     lisinopril (PRINIVIL,ZESTRIL) 40 MG tablet Take 1 tablet by mouth daily.             metFORMIN (GLUCOPHAGE-XR) 500 MG 24 hr tablet 2 tabs AM, 1 tab PM. Take with food. 90 tablet 2     metoprolol tartrate (LOPRESSOR) 100 MG tablet Take 100 mg by mouth 2 (two) times a day.       polyvinyl alcohol (LIQUIFILM TEARS) 1.4 % ophthalmic solution Administer 1 drop to both eyes as needed for dry eyes.       QUEtiapine (SEROQUEL) 100 MG tablet Take 200 mg by mouth at bedtime.        QUEtiapine (SEROQUEL) 25 MG tablet Take 25 mg by mouth 3 (three) times a day. Morning, afternoon and evening       spironolactone (ALDACTONE) 25 MG tablet Take 25 mg by mouth daily.       tiZANidine (ZANAFLEX) 4 MG tablet Take 1 tablet (4 mg total) by mouth 2 (two) times a day as needed. 30 tablet 1     warfarin ANTICOAGULANT (COUMADIN/JANTOVEN) 5 MG tablet Take 1 tablet (5mg) by mouth daily, as directed.  Adjust dose based on INR results. 100 tablet 1     insulin syringe-needle U-100 (BD INSULIN SYRINGE ULTRA-FINE) 1/2 mL 31 gauge x 15/64\" Syrg Use 1 each As Directed 2 (two) times a day. 100 Syringe 6     No current facility-administered medications for this encounter.  "

## 2021-06-04 NOTE — TELEPHONE ENCOUNTER
RN cannot approve Refill Request    RN can NOT refill this medication Protocol failed and NO refill given.  [place] [place], [facility] Triage/Med Refill 12/25/2019    Requested Prescriptions   Pending Prescriptions Disp Refills     warfarin ANTICOAGULANT (COUMADIN/JANTOVEN) 5 MG tablet [Pharmacy Med Name: WARFARIN SOD 5MG TABLETS (PEACH)] 90 tablet 0     Sig: TAKE 1 TABLET(5 MG) BY MOUTH DAILY       Warfarin Refill Protocol  Failed - 12/24/2019  1:15 PM        Failed -  Route to appropriate pool/provider     Last Anticoagulation Summary:   Anticoagulation Episode Summary     Current INR goal:   2.0-3.0   TTR:   --   Next INR check:   12/27/2019   INR from last check:      Weekly max warfarin dose:      Target end date:      INR check location:      Preferred lab:      Send INR reminders to:   [facility]    Indications    Chronic atrial fibrillation [I48.20]           Comments:            Anticoagulation Care Providers     Provider Role Specialty Phone number    Enio Lechuga MD Referring Internal Medicine [phone]                Passed - Provider visit in last year     Last office visit with prescriber/PCP: 12/24/2019 Enio Lecuhga MD OR same dept: 12/24/2019 Enio Lechuga MD OR same specialty: 12/24/2019 Enio Lechuga MD  Last physical: Visit date not found Last MTM visit: Visit date not found    Next appt within 3 mo: Visit date not found Next physical within 3 mo: Visit date not found  Prescriber OR PCP: Enio Lechuga MD  Last diagnosis associated with med order: 1. Chronic atrial fibrillation  - warfarin ANTICOAGULANT (COUMADIN/JANTOVEN) 5 MG tablet [Pharmacy Med Name: WARFARIN SOD 5MG TABLETS (PEACH)]; TAKE 1 TABLET(5 MG) BY MOUTH DAILY  Dispense: 90 tablet; Refill: 0    2. Type 2 diabetes mellitus without complication, with long-term current use of insulin (H)  - glipiZIDE (GLUCOTROL XL) 10 MG 24 hr tablet [Pharmacy Med Name: GLIPIZIDE ER 10MG TABLETS]; TAKE  2 TABLETS BY MOUTH TWICE DAILY 30 MINUES BEFORE EATING  Dispense: 360 tablet; Refill: 0    If protocol passes may refill for 6 months if within 3 months of last provider visit (or a total of 9 months).          glipiZIDE (GLUCOTROL XL) 10 MG 24 hr tablet [Pharmacy Med Name: GLIPIZIDE ER 10MG TABLETS] 360 tablet 0     Sig: TAKE 2 TABLETS BY MOUTH TWICE DAILY 30 MINUES BEFORE EATING       Oral Hypoglycemics Refill Protocol Failed - 12/24/2019  1:15 PM        Failed - Microalbumin in last year      No results found for: MICROALBUR               Passed - Visit with PCP or prescribing provider visit in last 6 months       Last office visit with prescriber/PCP: 12/24/2019 OR same dept: 12/24/2019 Enio Lechuga MD OR same specialty: 12/24/2019 Enio Lechuga MD Last physical: Visit date not found Last MTM visit: Visit date not found         Next appt within 3 mo: Visit date not found  Next physical within 3 mo: Visit date not found  Prescriber OR PCP: Enio Lechuga MD  Last diagnosis associated with med order: 1. Chronic atrial fibrillation  - warfarin ANTICOAGULANT (COUMADIN/JANTOVEN) 5 MG tablet [Pharmacy Med Name: WARFARIN SOD 5MG TABLETS (PEACH)]; TAKE 1 TABLET(5 MG) BY MOUTH DAILY  Dispense: 90 tablet; Refill: 0    2. Type 2 diabetes mellitus without complication, with long-term current use of insulin (H)  - glipiZIDE (GLUCOTROL XL) 10 MG 24 hr tablet [Pharmacy Med Name: GLIPIZIDE ER 10MG TABLETS]; TAKE 2 TABLETS BY MOUTH TWICE DAILY 30 MINUES BEFORE EATING  Dispense: 360 tablet; Refill: 0     If protocol passes may refill for 12 months if within 3 months of last provider visit (or a total of 15 months).           Passed - A1C in last 6 months     Hemoglobin A1c   Date Value Ref Range Status   12/24/2019 9.6 (H) 3.5 - 6.0 % Final               Passed - Blood pressure in last year     BP Readings from Last 1 Encounters:   12/24/19 104/60             Passed - Serum creatinine in last year     Creatinine    Date Value Ref Range Status   12/24/2019 0.83 0.70 - 1.30 mg/dL Final

## 2021-06-04 NOTE — TELEPHONE ENCOUNTER
Spoke with the patient and relayed message below from Dr. Lechuga.  He verbalized understanding and had no further questions at this time.  Samatnha ANDUJAR, ANAMIKA/CMT....................11:39 AM

## 2021-06-04 NOTE — TELEPHONE ENCOUNTER
Patient Returning Call  Reason for call:  Return call   Information relayed to patient:  Caller was informed of message below;  You still have uncontrolled diabetes, A1c of 9.6.  You also have low testosterone both total and free testosterone which account for your chronic fatigue..  I like you to see our Pharm.D.  for your diabetes and low testosterone.  I will make appointment for you. Rest of your labs are normal.  Continue same medications for now. Lab letter mailed to patient.   Patient has additional questions:  Yes  If YES, what are your questions/concerns:  Patient had questions on regards to therapy for testosterone.   Okay to leave a detailed message?: Yes

## 2021-06-05 NOTE — PROGRESS NOTES
MTM Follow Up Encounter  Assessment & Plan                                                     Fatigue/Low Testosterone:Unimproved - has not yet been able to scheduled with cardiology to discuss switch from spironolactone to eplerenone. Cardiology office to call pt back within the week for scheduling. Will continue to hold off on initiation of testosterone until pt has had cardiology follow-up.   -Recommend stop Spironolactone or change to eplerenone (pt to discuss with Cardiology)   -Repeat levels in 2-4 weeks after any changes per cardiology   -Future Consideration: If proceeding with Testosterone replacement, consider topical testosterone 1.62% 2 pumps daily - titrate to 1-4 pump actuations based on predose morning serum testosterone concentration drawn after 14-28 days.        Diabetes: Needs improvement - A1C above goal <8% and actually worsened since June. Fasting sugars above goal , while prandial sugars are actually at goal range <180. Question if this is due to pt changing his own insulin dose and now having overnight lows causing AM readings to be elevated. Will have pt decrease insulin. At the same time, will increase Metformin to further improve sensitivity and improve fasting readings.   -Increase Metformin  mg to 2 tabs two times a day   -Decrease Insulin NPH to 15 units two times a day       Chronic Atrial Fibrillation/Hypertension/CHF/CAD: Unimproved - pt unable to schedule with cardiology.     BP at goal <130/80. Pulse at goal . Given pt taking Cartia 2-3 times per month, likely does not need. Pt reports using metoprolol tartrate. Guidelines recommend metoprolol succinate over metoprolol tartrate as preferred options. Finally, pt's EF has significantly improved, weights stable, and pt mostly asymptomatic. As discussed above, spironolactone can reduce testosterone levels. May be reasonable to have a discussion with cardiology to trial a hold of spironolactone or switch to eplerenone  which lacks antiandrogenic effects. Finally, pt has a history of stent placement. On statin, with LDL at goal <70. Not currently using aspirin. Would be indicated even with anticoagulation.   -Recommend stop Cartia   -Recommend change from metoprolol tartrate to Metoprolol Succinate 200 mg daily.   -Recommend stop spironolactone or change to eplerenone   -Consider addition of aspirin 81 mg daily   -Pt to discuss above recommendations with Cardiology at upcoming follow-up. Recommendations were also faxed to Cardiology office.             Follow Up  No follow-ups on file.      Subjective & Objective                                                     Obi Messer is a 64 y.o. male coming in for an initial visit for Medication Therapy Management. He was referred to me from Enio Lechuga MD    Chief Complaint: Medication Management - interested in testosterone therapy     Medication Adherence/Access: Having problems with test strips. Spoke with [facility] on Friday - does not have test strips on file.       Fatigue/Low Testosterone: Symptoms mostly unchanged since last MTM visit. Has not yet scheduled with Cardiology.       Persistent fatigue. Gets hot flashes and sweats. Loss of body hair, muscle mass. Libido is non-existent.  Sweating and hot flashes are the most bothersome.        Ref. Range 12/24/2019 11:55   Free Testosterone Calc Latest Ref Range: 4.7 - 24.4 ng/dL 3.54 (L)   Patient Fasting > 8hrs? Unknown Unknown   Sex Hormone Bind Globulin Latest Ref Range: 11 - 80 nmol/L 44   TESTOSTERONE, FREE AND TOTAL (BTEST) Unknown Rpt (!)   Testosterone, Total Latest Ref Range: 240 - 950 ng/dL 222 (L)         Lab Results   Component Value Date    WBC 13.2 (H) 12/24/2019    HGB 14.1 12/24/2019    HCT 43.2 12/24/2019    MCV 91 12/24/2019     12/24/2019     Lab Results   Component Value Date    CHOL 108 12/24/2019    CHOL 105 06/25/2019    CHOL 124 06/22/2016     Lab Results   Component Value Date    HDL 33  (L) 2019    HDL 38 (L) 2019    HDL 34 (L) 2016     Lab Results   Component Value Date    LDLCALC 51 2019    LDLCALC 45 2019    LDLCALC 60 2016     Lab Results   Component Value Date    TRIG 120 2019    TRIG 108 2019    TRIG 150 (H) 2016     No components found for: CHOLHDL      Diabetes:  Pt currently taking Glipizide 10 mg ER 2 tabs two times a day, Metformin 500 mg ER 2 tabs AM and 1 tab PM, Novolin NPH 20 units AM and 20 units PM. Unclear why, but pt thought PharmD had adjusted his insulin dose.      patient is not currently experiencing side effects. Negligible GI distress.     Has tried Trulicity in the past - cost was a big concern and didn't find it very effective.     Pt feels he's very insensitive to insulin.      SMBG: twice daily    Ranges (patient reported) :     Had some cookies last night so this morning was high. This was not typical - usually doing his snacking during the day.  Not really eating much during the day though because he's using Topiramate from psychiatry.     Fastin, 133, 116, 169, 183, 174, 217    Evenin,100, 94, 122, 102, 245 (at 2:11), 154, 179            Patient is not experiencing hypoglycemia   Recent symptoms of high blood sugar? Polyuria (but is on Furosemide), Polydipsia.     ACEi/ARB:  Lisinopril 40 mg daily   Statin: Atorvastatin 40 mg daily   Aspirin: Not taking        Lab Results   Component Value Date    HGBA1C 9.6 (H) 2019    HGBA1C 8.8 (H) 2019    HGBA1C 7.9 (H) 2016     Lab Results   Component Value Date    LDLCALC 51 2019    CREATININE 0.83 2019          Chronic Atrial Fibrillation/Hypertension/ CHF/ CAD: Prescribed Atorvastatin 40 mg daily, Digoxin 250 mcg daily, Metoprolol Tartrate 100 mg two times a day, Lisinopril 40 mg daily, Furosemide 40 mg daily, Spironolactone 25 mg daily, and warfarin 5 mg as instructed by [facility] anticoag team. Also prescribed Cartia 120 mg ER once daily  - pt is using infrequently 2-3 days per month when BP gets high (above 150 systolic) will take a dose.     18 Echo: EF improved to 50-55% from 25% in 2012     Periodically has swelling in leg/ankles. Pt manages this with adjusting furosemide. Not checking daily weights. Checks weights a couple times per week. They've been steady - slightly less than 293# at home              PMH: reviewed in EPIC   Allergies/ADRs: reviewed in EPIC   Alcohol:   Social History     Substance and Sexual Activity   Alcohol Use Yes    Comment: 2/week       Tobacco:   Social History     Tobacco Use   Smoking Status Former Smoker     Last attempt to quit: 2014     Years since quittin.0   Smokeless Tobacco Current User     Today's Vitals: There were no vitals filed for this visit.  ----------------    Much or all of the text in this note was generated through the use of Dragon Dictate voice-to-text software. Errors in spelling or words which seem out of context are unintentional. Sound alike errors, in particular, may have escaped editing.    The patient was given a summary of these recommendations by mail    I spent 30 minutes with this patient today.   All changes were made via collaborative practice agreement with Enio Lechuga MD. A copy of the visit note was provided to the patient's provider.     Gildardo Sykes, KervinD  Medication Therapy Management (MTM) Pharmacist  [facility]           Current Outpatient Medications   Medication Sig Dispense Refill     acetaminophen (TYLENOL) 500 MG tablet Take 1,000 mg by mouth every 6 (six) hours as needed for pain.       atorvastatin (LIPITOR) 40 MG tablet TAKE 1 TABLET BY MOUTH DAILY AT BEDTIME 90 tablet 0     blood glucose test strips Use 1 each As Directed 2 (two) times a day. Dispense brand per patient's insurance at pharmacy discretion. 100 strip 3     digoxin (LANOXIN) 250 mcg tablet Take 1 tablet (250 mcg total) by mouth daily. 90 tablet 3      "DULoxetine (CYMBALTA) 30 MG capsule Take 30 mg by mouth daily. ALONG WITH 60MG DOSE, TOTAL 90MG DAILY              DULoxetine (CYMBALTA) 60 MG capsule Take 1 capsule by mouth daily. ALONG WITH THE 30MG FOR A TOTAL DAILY DOSE OF 90MG  1     furosemide (LASIX) 40 MG tablet TAKE 1 TABLET BY MOUTH DAILY 90 tablet 2     glipiZIDE (GLUCOTROL XL) 10 MG 24 hr tablet TAKE 2 TABLETS BY MOUTH TWICE DAILY 30 MINUES BEFORE EATING 360 tablet 0     insulin NPH (NOVOLIN N NPH U-100 INSULIN) 100 unit/mL injection Inject 25 units twice per day, (total 50 units per day), increase as instructed, up to 100 units per day. (Patient taking differently: Inject 10 units in the AM, 15 in the pm) 20 mL 1     insulin syringe-needle U-100 (BD INSULIN SYRINGE ULTRA-FINE) 1/2 mL 31 gauge x 15/64\" Syrg Use 1 each As Directed 2 (two) times a day. 100 Syringe 6     lisinopril (PRINIVIL,ZESTRIL) 40 MG tablet Take 1 tablet by mouth daily.             metFORMIN (GLUCOPHAGE-XR) 500 MG 24 hr tablet 2 tabs AM, 1 tab PM. Take with food. 90 tablet 2     metoprolol tartrate (LOPRESSOR) 100 MG tablet Take 100 mg by mouth 2 (two) times a day.       polyvinyl alcohol (LIQUIFILM TEARS) 1.4 % ophthalmic solution Administer 1 drop to both eyes as needed for dry eyes.       QUEtiapine (SEROQUEL) 100 MG tablet Take 200 mg by mouth at bedtime.        QUEtiapine (SEROQUEL) 25 MG tablet Take 25 mg by mouth 3 (three) times a day. Morning, afternoon and evening       spironolactone (ALDACTONE) 25 MG tablet TAKE 1 TABLET BY MOUTH DAILY 90 tablet 3     tiZANidine (ZANAFLEX) 4 MG tablet Take 1 tablet (4 mg total) by mouth 2 (two) times a day as needed. 30 tablet 1     warfarin ANTICOAGULANT (COUMADIN/JANTOVEN) 5 MG tablet Take 1 tablet (5mg) by mouth daily, as directed.  Adjust dose based on INR results. 100 tablet 1     No current facility-administered medications for this visit.            "

## 2021-06-05 NOTE — TELEPHONE ENCOUNTER
ANTICOAGULATION  MANAGEMENT    Assessment     Today's INR result of 2.20 is Therapeutic (goal INR of 2.0-3.0)        Warfarin taken as previously instructed    No new diet changes affecting INR    No new medication/supplements affecting INR    Continues to tolerate warfarin with no reported s/s of bleeding or thromboembolism     Previous INR was Therapeutic at 2.30 on 1/6/2020.    Plan:     Left a detailed message for Pedro Luis regarding INR result and instructed:     Warfarin Dosing Instructions:    - Continue current warfarin dose 5 mg daily.    Instructed patient to follow up no later than:   4 wks.   - scheduled on 2/12/20 @ [facility].    Education provided: target INR goal and significance of current INR result and importance of notifying clinic for changes in medications      Instructed to call the [facility] for any changes, questions or concerns. (#[phone])   ?   Christelle Huerta RN    Subjective/Objective:      Obi Messer, a 64 y.o. male is on warfarin.     Obi reports:     Home warfarin dose: as updated on anticoagulation calendar per template     Missed doses: No     Medication changes:  No     S/S of bleeding or thromboembolism:  No     New Injury or illness:  No     Changes in diet or alcohol consumption:  No     Upcoming surgery, procedure or cardioversion:  No    Anticoagulation Episode Summary     Current INR goal:   2.0-3.0   TTR:   96.4 % (1.7 wk)   Next INR check:   2/12/2020   INR from last check:   2.20 (1/15/2020)   Weekly max warfarin dose:      Target end date:      INR check location:      Preferred lab:      Send INR reminders to:   [facility]    Indications    Chronic atrial fibrillation [I48.20]           Comments:            Anticoagulation Care Providers     Provider Role Specialty Phone number    Enio Lechuga MD Referring Internal Medicine [phone]

## 2021-06-05 NOTE — TELEPHONE ENCOUNTER
RN cannot approve Refill Request    RN can NOT refill this medication historical medication requested.       Blanca Meadows, [facility] Triage/Med Refill 1/22/2020    Requested Prescriptions   Pending Prescriptions Disp Refills     spironolactone (ALDACTONE) 25 MG tablet [Pharmacy Med Name: SPIRONOLACTONE 25MG TABLETS] 90 tablet 0     Sig: TAKE 1 TABLET BY MOUTH DAILY       Diuretics/Combination Diuretics Refill Protocol  Passed - 1/21/2020 10:24 AM        Passed - Visit with PCP or prescribing provider visit in past 12 months     Last office visit with prescriber/PCP: 12/24/2019 Enio Lechuga MD OR same dept: 12/24/2019 Enio Lechuga MD OR same specialty: 12/24/2019 Enio Lechuga MD  Last physical: Visit date not found Last MTM visit: Visit date not found   Next visit within 3 mo: Visit date not found  Next physical within 3 mo: Visit date not found  Prescriber OR PCP: Enio Lechuga MD  Last diagnosis associated with med order: There are no diagnoses linked to this encounter.  If protocol passes may refill for 12 months if within 3 months of last provider visit (or a total of 15 months).             Passed - Serum Potassium in past 12 months      Lab Results   Component Value Date    Potassium 3.5 12/24/2019             Passed - Serum Sodium in past 12 months      Lab Results   Component Value Date    Sodium 138 12/24/2019             Passed - Blood pressure on file in past 12 months     BP Readings from Last 1 Encounters:   12/24/19 104/60             Passed - Serum Creatinine in past 12 months      Creatinine   Date Value Ref Range Status   12/24/2019 0.83 0.70 - 1.30 mg/dL Final

## 2021-06-05 NOTE — TELEPHONE ENCOUNTER
Who is calling:  Ashley from [facility]  Reason for Call:  See below  Date of last appointment with primary care:   Okay to leave a detailed message: Yes     Ashley is calling to notify INR clinic that patient discharged from [facility] on 2/6. Patient was admitted for blood pressure and low oxygen Hypoxia.     Please call patient and f/u on INR status.

## 2021-06-05 NOTE — PATIENT INSTRUCTIONS - HE
Recommendations from today's MTM visit:                                                    MTM (medication therapy management) is a service provided by a clinical pharmacist designed to help you get the most of out of your medicines. and Today we reviewed what your medicines are for, how to know if they are working, that your medicines are safe and how to make your medicine regimen as easy as possible.     1. Increase Metformin 500 mg ER to 2 tabs in the morning and 1 tab in the evening. Take with food to reduce GI upset. I sent an updated prescription for this.     2. I have sent an updated prescription for test strips to [facility]     3. Given that you are using Cartia only 2-3 times per month, ask your cardiologist if this is something you need to continue.    4. Guidelines have given preferences for certain formulations of medications as they have been the options studied and proven to have benefits in patients with heart failure. You are currently using metoprolol tartrate. Guidelines recommend a different formulation called Metoprolol Succinate. Ask your cardiologist if you would benefit from switching. The recommended dose of Metoprolol Succinate would be 200 mg once daily.     5. We did not discuss this during our call today, but given your history of stent placement, guidelines recommend aspirin (in addition to the warfarin you are using for A. Fib). Ask your cardiologist if you should use a daily aspirin. Recommended dose would be 81 mg once daily.     6. We discussed that one of your medications for heart failure, spironolactone, can cause decreased testosterone levels. We did not discuss this over the phone, but there is a similar medication call Eplerenone that can be used for heart failure that does not have the same effects on testosterone levels as Spironolactone. Ask your cardiologist if you might benefit from changing to Eplerenone 25 mg daily.       It was great to speak with you today.  I value  your experience and would be very thankful for your time with providing feedback on our clinic survey. You may receive a survey via email or text message in the next few days.     Next MTM visit: January 27th at 2:30 via phone     To schedule another MTM appointment, please call the clinic directly or you may call the MTM scheduling line at [phone] or toll-free at [phone].     My Clinical Pharmacist's contact information:                                                      It was a pleasure seeing you today!  Please feel free to contact me with any questions or concerns you have.      Gildardo Sykes, PharmD  Medication Therapy Management (MTM) Pharmacist  [facility]

## 2021-06-05 NOTE — TELEPHONE ENCOUNTER
RN cannot approve Refill Request    RN can NOT refill this medication PCP messaged that patient is overdue for Labs. Last office visit: 12/24/2019 Enio Lechuga MD Last Physical: Visit date not found Last MTM visit: Visit date not found Last visit same specialty: 12/24/2019 Enio Lechuga MD.    Upcoming appointments by 3/24/2020    Aretha Ramos, [facility] Triage/Med Refill 2/2/2020    Requested Prescriptions   Pending Prescriptions Disp Refills     metFORMIN (GLUCOPHAGE-XR) 500 MG 24 hr tablet [Pharmacy Med Name: METFORMIN ER 500MG 24HR TABS] 360 tablet 0     Sig: TAKE 2 TABLETS BY MOUTH TWICE DAILY WITH FOOD       Metformin Refill Protocol Failed - 2/1/2020  6:57 AM        Failed - Microalbumin in last year      No results found for: MICROALBUR               Passed - Blood pressure in last 12 months     BP Readings from Last 1 Encounters:   12/24/19 104/60             Passed - LFT or AST or ALT in last 12 months     Albumin   Date Value Ref Range Status   12/24/2019 3.6 3.5 - 5.0 g/dL Final     Bilirubin, Total   Date Value Ref Range Status   12/24/2019 0.4 0.0 - 1.0 mg/dL Final     Bilirubin, Direct   Date Value Ref Range Status   12/24/2019 0.2 <=0.5 mg/dL Final     Alkaline Phosphatase   Date Value Ref Range Status   12/24/2019 111 45 - 120 U/L Final     AST   Date Value Ref Range Status   12/24/2019 13 0 - 40 U/L Final     ALT   Date Value Ref Range Status   12/24/2019 19 0 - 45 U/L Final     Protein, Total   Date Value Ref Range Status   12/24/2019 7.2 6.0 - 8.0 g/dL Final                Passed - GFR or Serum Creatinine in last 6 months     GFR MDRD Non Af Amer   Date Value Ref Range Status   12/24/2019 >60 >60 mL/min/1.73m2 Final     GFR MDRD Af Amer   Date Value Ref Range Status   12/24/2019 >60 >60 mL/min/1.73m2 Final             Passed - Visit with PCP or prescribing provider visit in last 6 months or next 3 months     Last office visit with prescriber/PCP: 12/24/2019 OR same dept:  12/24/2019 Enio Lechuga MD OR same specialty: 12/24/2019 Enio Lechuga MD Last physical: Visit date not found Last MTM visit: Visit date not found         Next appt within 3 mo: Visit date not found  Next physical within 3 mo: Visit date not found  Prescriber OR PCP: Enio Lechuga MD  Last diagnosis associated with med order: 1. Type 2 diabetes mellitus without complication, without long-term current use of insulin (H)  - metFORMIN (GLUCOPHAGE-XR) 500 MG 24 hr tablet [Pharmacy Med Name: METFORMIN ER 500MG 24HR TABS]; TAKE 2 TABLETS BY MOUTH TWICE DAILY WITH FOOD  Dispense: 360 tablet; Refill: 0     If protocol passes may refill for 12 months if within 3 months of last provider visit (or a total of 15 months).           Passed - A1C in last 6 months     Hemoglobin A1c   Date Value Ref Range Status   12/24/2019 9.6 (H) 3.5 - 6.0 % Final

## 2021-06-05 NOTE — TELEPHONE ENCOUNTER
RN cannot approve Refill Request    RN can NOT refill this medication associated diagnosis needed. Last office visit: 12/24/2019 Enio Lechuga MD Last Physical: Visit date not found Last MTM visit: Visit date not found Last visit same specialty: 12/24/2019 Enio Lechuga MD.  Next visit within 3 mo: Visit date not found  Next physical within 3 mo: Visit date not found      Kamilah Dixon, [facility] Triage/Med Refill 1/11/2020    Requested Prescriptions   Pending Prescriptions Disp Refills     atorvastatin (LIPITOR) 40 MG tablet [Pharmacy Med Name: ATORVASTATIN 40MG TABLETS] 90 tablet 0     Sig: TAKE 1 TABLET BY MOUTH DAILY AT BEDTIME       Statins Refill Protocol (Hmg CoA Reductase Inhibitors) Passed - 1/9/2020 12:57 AM        Passed - PCP or prescribing provider visit in past 12 months      Last office visit with prescriber/PCP: 12/24/2019 Enio Lechuga MD OR same dept: 12/24/2019 Enio Lechuga MD OR same specialty: 12/24/2019 Enio Lechuga MD  Last physical: Visit date not found Last MTM visit: Visit date not found   Next visit within 3 mo: Visit date not found  Next physical within 3 mo: Visit date not found  Prescriber OR PCP: Enio Lechuga MD  Last diagnosis associated with med order: There are no diagnoses linked to this encounter.  If protocol passes may refill for 12 months if within 3 months of last provider visit (or a total of 15 months).

## 2021-06-05 NOTE — TELEPHONE ENCOUNTER
ANTICOAGULATION  MANAGEMENT: Discharge Review    Obi Messer chart reviewed for anticoagulation continuity of care    Emergency room visit on  2/6/2020 for shortness of breath and hypotension.   - patient declined admission for further monitoring of hypotension; (BP of 70/35 with normal HR).   - he fell out of his chair.  Got dizzy.     - lactate of 5.5   (given 2 L of fluid)   - reported patient stopped one of HTN med on his own.    Discharge disposition: Home    INR Results:  On 2/6/2020 INR was therapeutic at 2.10     No results for input(s): INR in the last 168 hours.    Warfarin inpatient management: home regimen continued    - continue 5mg warfarin daily.    Warfarin discharge instructions: home regimen continued     Medication Changes Affecting Anticoagulation: No    Additional Factors Affecting Anticoagulation: No    Plan     No adjustment to anticoagulation plan needed      Spoke with Pedro Luis - recommended he follows up soon with Dr. Lechuga on Mon. 2/10/2020.  He reports he also needs to see his cardiologist.s  Advised to bring all his medications he is currently taking during his appt.      Anticoagulation calendar updated    Christelle Huerta RN

## 2021-06-05 NOTE — TELEPHONE ENCOUNTER
Who is calling:  Patient  Reason for Call:  Patient wanted to let ACN know that he received voicemail from 01/15 and he is scheduled for INR 02/12/20  Date of last appointment with primary care: na  Okay to leave a detailed message: Yes

## 2021-06-06 NOTE — TELEPHONE ENCOUNTER
ANTICOAGULATION  MANAGEMENT: Discharge Review    Obi Messer chart reviewed for anticoagulation continuity of care    Emergency room visit @ [facility] on  2/6/20 for shortness of breath / dizziness and fell from his chair.    - EMS arrival found him to be in atrial fibrillation with normal rate.  BP was low 70/35 mmHg and also low O2 sats at 89%.   - due to Hypotension   - EMS noted ETOH intoxication.    Discharge disposition: Home   (pt did not want to be admitted to the hospital for observation).  INR Results:     No results for input(s): INR in the last 168 hours.    Warfarin inpatient management: home regimen continued    Warfarin discharge instructions: home regimen continued     Medication Changes Affecting Anticoagulation: No    Additional Factors Affecting Anticoagulation: No    Plan     No adjustment to anticoagulation plan needed      Patient not contacted    Anticoagulation calendar updated    Christelle Huerta RN

## 2021-06-06 NOTE — TELEPHONE ENCOUNTER
RN cannot approve Refill Request    RN can NOT refill this medication Protocol failed and NO refill given.      Blanca Meadows, [facility] Triage/Med Refill 3/12/2020    Requested Prescriptions   Pending Prescriptions Disp Refills     glipiZIDE (GLUCOTROL XL) 10 MG 24 hr tablet [Pharmacy Med Name: GLIPIZIDE ER 10MG TABLETS] 120 tablet 0     Sig: TAKE 2 TABLETS BY MOUTH TWICE DAILY 30 MINUTES BEFORE EATING       Oral Hypoglycemics Refill Protocol Failed - 3/9/2020  5:56 AM        Failed - Microalbumin in last year      No results found for: MICROALBUR               Passed - Visit with PCP or prescribing provider visit in last 6 months       Last office visit with prescriber/PCP: 12/24/2019 OR same dept: 12/24/2019 Enio Lechuga MD OR same specialty: 12/24/2019 Enio Lechuga MD Last physical: Visit date not found Last MTM visit: Visit date not found         Next appt within 3 mo: Visit date not found  Next physical within 3 mo: Visit date not found  Prescriber OR PCP: Enio Lechuga MD  Last diagnosis associated with med order: 1. Type 2 diabetes mellitus without complication, with long-term current use of insulin (H)  - glipiZIDE (GLUCOTROL XL) 10 MG 24 hr tablet [Pharmacy Med Name: GLIPIZIDE ER 10MG TABLETS]; TAKE 2 TABLETS BY MOUTH TWICE DAILY 30 MINUTES BEFORE EATING  Dispense: 120 tablet; Refill: 0     If protocol passes may refill for 12 months if within 3 months of last provider visit (or a total of 15 months).           Passed - A1C in last 6 months     Hemoglobin A1c   Date Value Ref Range Status   12/24/2019 9.6 (H) 3.5 - 6.0 % Final               Passed - Blood pressure in last year     BP Readings from Last 1 Encounters:   12/24/19 104/60             Passed - Serum creatinine in last year     Creatinine   Date Value Ref Range Status   12/24/2019 0.83 0.70 - 1.30 mg/dL Final

## 2021-06-06 NOTE — PROGRESS NOTES
MTM Follow Up Encounter  Assessment & Plan                                                       Chronic Atrial Fibrillation/Hypertension/CHF/CAD: Improved - Pt has had no repeat episodes of hypotension since ED discharge. Reviewed concerns with alcohol use pushing patient into A. Fib and affecting antihypertensive medications. Pt to follow-up with cardiology for follow up and to review previous MTM recommendations       Fatigue/Low Testosterone: Improved - Pt does now have cardiology follow-up scheduled. Pt to discuss switch from spironolactone to eplerenone. Will continue to hold off on initiation of testosterone until pt has had cardiology follow-up.   -Recommend stop Spironolactone or change to eplerenone (pt to discuss with Cardiology)   -Repeat levels in 2-4 weeks after any changes per cardiology   -Future Consideration: If proceeding with Testosterone replacement, consider topical testosterone 1.62% 2 pumps daily - titrate to 1-4 pump actuations based on predose morning serum testosterone concentration drawn after 14-28 days.        Diabetes: Improved - A1C above goal <8% However, fasting sugars mostly at goal , and prandial sugars most at goal <180. Suspect next A1C would be closer to goal, if not at goal.   -Continue current therapy       Follow Up  Return in about 6 weeks (around 3/27/2020) for Medication Management Pharmacist.      Subjective & Objective                                                     Obi Messer is a 64 y.o. male coming in for an initial visit for Medication Therapy Management. He was referred to me from Enio Lechuga MD. Pt was recently seen in the ED for hypotension. Was noted to be intoxicated at the time.     Chief Complaint: Medication Management - interested in testosterone therapy     Medication Adherence/Access: Having problems with test strips. Spoke with [facility] on Friday - does not have test strips on file.          Chronic Atrial  Fibrillation/Hypertension/ CHF/ CAD: Prescribed Atorvastatin 40 mg daily, Digoxin 250 mcg daily, Metoprolol Tartrate 100 mg two times a day, Lisinopril 40 mg daily, Furosemide 40 mg daily, Spironolactone 25 mg daily, and warfarin 5 mg as instructed by [facility] anticoag team. Also prescribed Cartia 120 mg ER once daily - pt is using infrequently 2-3 days per month when BP gets high (above 150 systolic) will take a dose.     2/22/18 Echo: EF improved to 50-55% from 25% in 2012     Was brought to ED after a fall. Noted to be in A. Fib with normal rhythm but hypotensive. Pt refused admission.     Denies any lightheadedness since discharge from the ED checking home BP and they have been normal.     1.75 L bottle every other month    Scheduled with Cardiology on 3/4.       BP Readings from Last 3 Encounters:   12/24/19 104/60   09/09/19 136/83   08/28/19 112/64     Pulse Readings from Last 3 Encounters:   12/24/19 75   09/09/19 78   08/28/19 89         Fatigue/Low Testosterone: Symptoms mostly unchanged since last MTM visit. Has not yet scheduled with Cardiology.       Persistent fatigue. Gets hot flashes and sweats. Loss of body hair, muscle mass. Libido is non-existent.  Sweating and hot flashes are the most bothersome.        Ref. Range 12/24/2019 11:55   Free Testosterone Calc Latest Ref Range: 4.7 - 24.4 ng/dL 3.54 (L)   Patient Fasting > 8hrs? Unknown Unknown   Sex Hormone Bind Globulin Latest Ref Range: 11 - 80 nmol/L 44   TESTOSTERONE, FREE AND TOTAL (BTEST) Unknown Rpt (!)   Testosterone, Total Latest Ref Range: 240 - 950 ng/dL 222 (L)         Lab Results   Component Value Date    WBC 13.2 (H) 12/24/2019    HGB 14.1 12/24/2019    HCT 43.2 12/24/2019    MCV 91 12/24/2019     12/24/2019     Lab Results   Component Value Date    CHOL 108 12/24/2019    CHOL 105 06/25/2019    CHOL 124 06/22/2016     Lab Results   Component Value Date    HDL 33 (L) 12/24/2019    HDL 38 (L) 06/25/2019    HDL 34 (L) 06/22/2016     Lab  Results   Component Value Date    LDLCALC 51 2019    LDLCALC 45 2019    LDLCALC 60 2016     Lab Results   Component Value Date    TRIG 120 2019    TRIG 108 2019    TRIG 150 (H) 2016     No components found for: CHOLHDL          Diabetes:  Pt currently prescribed Glipizide 10 mg ER 2 tabs two times a day, Metformin 500 mg ER 2 tabs two times a day, and Novolin NPH 15 units two times a day.      patient is not currently experiencing side effects. Is having some mild GI distress, but not enough that pt would like to discontinue.        SMBG: twice daily    Ranges (patient reported) :     Fastins-low 1teens   Prandial: mostly 110-160, with 2-3 outliers in the 200s.     Patient is not experiencing hypoglycemia   Recent symptoms of high blood sugar? Polyuria continues due to Furosemide. Still having polyuria, but doesn't feel this is due to diabetes. .   ACEi/ARB:  Lisinopril 40 mg daily   Statin: Atorvastatin 40 mg daily   Aspirin: Not taking        Lab Results   Component Value Date    HGBA1C 9.6 (H) 2019    HGBA1C 8.8 (H) 2019    HGBA1C 7.9 (H) 2016     Lab Results   Component Value Date    LDLCALC 51 2019    CREATININE 0.83 2019             PMH: reviewed in EPIC   Allergies/ADRs: reviewed in EPIC   Alcohol:   Social History     Substance and Sexual Activity   Alcohol Use Yes    Comment: 2/week       Tobacco:   Social History     Tobacco Use   Smoking Status Former Smoker     Last attempt to quit: 2014     Years since quittin.0   Smokeless Tobacco Current User     Today's Vitals: There were no vitals filed for this visit.  ----------------    Much or all of the text in this note was generated through the use of Dragon Dictate voice-to-text software. Errors in spelling or words which seem out of context are unintentional. Sound alike errors, in particular, may have escaped editing.    The patient was given a summary of these recommendations  "by mail    I spent 15 minutes with this patient today.   All changes were made via collaborative practice agreement with Enio Lechuga MD. A copy of the visit note was provided to the patient's provider.     Gildardo Sykes, PharmD  Medication Therapy Management (MTM) Pharmacist  [facility]           Current Outpatient Medications   Medication Sig Dispense Refill     acetaminophen (TYLENOL) 500 MG tablet Take 1,000 mg by mouth every 6 (six) hours as needed for pain.       atorvastatin (LIPITOR) 40 MG tablet TAKE 1 TABLET BY MOUTH DAILY AT BEDTIME 90 tablet 0     blood glucose test strips Use 1 each As Directed 2 (two) times a day. Dispense brand per patient's insurance at pharmacy discretion. 100 strip 3     digoxin (LANOXIN) 250 mcg tablet Take 1 tablet (250 mcg total) by mouth daily. 90 tablet 3     DULoxetine (CYMBALTA) 30 MG capsule Take 30 mg by mouth daily. ALONG WITH 60MG DOSE, TOTAL 90MG DAILY              DULoxetine (CYMBALTA) 60 MG capsule Take 1 capsule by mouth daily. ALONG WITH THE 30MG FOR A TOTAL DAILY DOSE OF 90MG  1     furosemide (LASIX) 40 MG tablet TAKE 1 TABLET BY MOUTH DAILY 90 tablet 2     glipiZIDE (GLUCOTROL XL) 10 MG 24 hr tablet TAKE 2 TABLETS BY MOUTH TWICE DAILY 30 MINUES BEFORE EATING 360 tablet 0     insulin syringe-needle U-100 (BD INSULIN SYRINGE ULTRA-FINE) 1/2 mL 31 gauge x 15/64\" Syrg Use 1 each As Directed 2 (two) times a day. 100 Syringe 6     lisinopril (PRINIVIL,ZESTRIL) 40 MG tablet Take 1 tablet by mouth daily.             metFORMIN (GLUCOPHAGE-XR) 500 MG 24 hr tablet TAKE 2 TABLETS BY MOUTH TWICE DAILY WITH FOOD 360 tablet 0     metoprolol tartrate (LOPRESSOR) 100 MG tablet Take 100 mg by mouth 2 (two) times a day.       NOVOLIN N NPH U-100 INSULIN 100 unit/mL injection Inject 15 Units under the skin 2 (two) times a day before meals. 10 mL PRN     polyvinyl alcohol (LIQUIFILM TEARS) 1.4 % ophthalmic solution Administer 1 drop to both eyes as needed " for dry eyes.       QUEtiapine (SEROQUEL) 100 MG tablet Take 200 mg by mouth at bedtime.        QUEtiapine (SEROQUEL) 25 MG tablet Take 25 mg by mouth 3 (three) times a day. Morning, afternoon and evening       spironolactone (ALDACTONE) 25 MG tablet TAKE 1 TABLET BY MOUTH DAILY 90 tablet 3     tiZANidine (ZANAFLEX) 4 MG tablet Take 1 tablet (4 mg total) by mouth 2 (two) times a day as needed. 30 tablet 1     topiramate (TOPAMAX) 25 MG tablet Take 25 mg by mouth 2 (two) times a day.       warfarin ANTICOAGULANT (COUMADIN/JANTOVEN) 5 MG tablet Take 1 tablet (5mg) by mouth daily, as directed.  Adjust dose based on INR results. 100 tablet 1     No current facility-administered medications for this visit.

## 2021-06-07 NOTE — TELEPHONE ENCOUNTER
ANTICOAGULATION  MANAGEMENT PROGRAM    Obi Messer is overdue for INR check.     Left message to call and schedule INR appointment as soon as possible.      Christelle Huerta RN

## 2021-06-07 NOTE — TELEPHONE ENCOUNTER
ANTICOAGULATION  MANAGEMENT PROGRAM    Obi Messer is overdue for INR check.     Spoke with Pedro Luis and scheduled INR appointment on 4/21/20 @ [facility].      Christelle Huerta RN

## 2021-06-07 NOTE — PROGRESS NOTES
"Obi Messer is a 64 y.o. male who is being evaluated via a billable telephone visit.      The patient has been notified of following:     \"This telephone visit will be conducted via a call between you and your physician/provider. We have found that certain health care needs can be provided without the need for a physical exam.  This service lets us provide the care you need with a short phone conversation.  If a prescription is necessary we can send it directly to your pharmacy.  If lab work is needed we can place an order for that and you can then stop by our lab to have the test done at a later time.    If during the course of the call the physician/provider feels a telephone visit is not appropriate, you will not be charged for this service.\"     Patient has given verbal consent to a Telephone visit? Yes    Obi Messer complains of    Chief Complaint   Patient presents with     Letter for School/Work     out work all of last week, and this week due to cough     Follow-up     saw  on 4/1, no cough today, currently taking omnicef        I have reviewed and updated the patient's Past Medical History, Social History, Family History and Medication List.    ALLERGIES  Patient has no known allergies.    Additional provider notes:  Phone visit with Pedro Luis for follow up of his cough. Has been coughing with low grade feverand shortness of breath. Went to the  last week. Was worked up for possible COVID suspect. Chest xray and labs were normal. Was treated with Omnicef for 10 days and will finished Omnicef in a week. Now feels better. Cough, shortness of breath and fever are resolved. Wants to go back to work. But stll feels tired. Has chronic atrial fib and had INR a week ago which was therapeutic. Continued on same warfarin dose.Was advised to have repeat INR next week after he finished his antibiotic next week. Has diabetes and hypertension controlled by his medications.     Assessment/Plan:  1. Viral URI with " cough  Now resolved. But advised to rest for another week before going back to work to fully recover.. Will finish his antibiotic in a week. Will allow him return to work on April 20, 2020.    2. Type 2 diabetes mellitus without complication, without long-term current use of insulin (H)  Controlled. Continue same diabetes meds.     3. Chronic atrial fibrillation  On warfarin. Will repeat INR next week after his finishes his antibiotic.     Work release letter was issued allowing him to go back to work on April 20, 2020.         Phone call duration:  15  minutes    Veronika Guerrero LPN

## 2021-06-07 NOTE — TELEPHONE ENCOUNTER
"Anticoagulation Management    Unable to reach Prefers to be calls \" Pedro Luis \" today.    Today's INR result of 1.60 is Subtherapeutic (goal INR of 2.0-3.0).     Follow up required to assess for changes .    Left message to take a booster dose of warfarin,  7.5 mg tonight.       ACN to follow up - Wed. 4/22.    Christelle Huerta RN            "

## 2021-06-07 NOTE — TELEPHONE ENCOUNTER
"Who is calling:  patient  Reason for Call:  Patient called to reschedule his INR to 4/13/2020. When asked he stated he has a cough, he had a telephone visit on 4/6/2020 with Dr Lechuga. Patient stated COVID-19 was \"ruled out\" by Dr Lechuga.  Date of last appointment with primary care: 4/6/2020  Okay to leave a detailed message: Yes      "

## 2021-06-07 NOTE — TELEPHONE ENCOUNTER
ANTICOAGULATION  MANAGEMENT PROGRAM    Obi Messer is overdue for INR check.     Spoke with Pedro Luis and scheduled INR appointment on 4/16/20 @ [facility] - PATIENT WAS A NO SHOW.      Christelle Huerta RN

## 2021-06-07 NOTE — TELEPHONE ENCOUNTER
ANTICOAGULATION  MANAGEMENT    Assessment     Today's INR result of 1.60 is Subtherapeutic (goal INR of 2.0-3.0)        Missed dose(s) may be affecting INR    No new diet changes affecting INR    No new medication/supplements affecting INR    Continues to tolerate warfarin with no reported s/s of bleeding or thromboembolism     Previous INR was Therapeutic at 3.00 on 4/3/20.    Plan:     Spoke with Pedro Luis regarding INR result and instructed:     Warfarin Dosing Instructions:  (has 5mg tabs)   - last night on 4/21/20, reported taking one time booster with 7.5mg warfarin dose.   - Continue current warfarin dose 5 mg daily.    Instructed patient to follow up no later than:  1-2 wks.   - INR scheduled on 5/7/20 @ [facility].    Education provided: target INR goal and significance of current INR result and importance of taking warfarin as instructed    Pedro Luis verbalizes understanding and agrees to warfarin dosing plan.    Instructed to call the [facility] for any changes, questions or concerns. (#[phone])   ?   Christelle Huerta RN    Subjective/Objective:      Obi Messer, a 64 y.o. male is on warfarin.     Obi reports:     Home warfarin dose: as updated on anticoagulation calendar per template     Missed doses: Yes:  Reported skipping Sunday (4/19), since he thought he was running high on last INR.     Medication changes:  No     S/S of bleeding or thromboembolism:  No     New Injury or illness:  No     Changes in diet or alcohol consumption:  No     Upcoming surgery, procedure or cardioversion:  No    Anticoagulation Episode Summary     Current INR goal:   2.0-3.0   TTR:   94.2 % (3.6 mo)   Next INR check:   5/5/2020   INR from last check:   1.60! (4/21/2020)   Weekly max warfarin dose:      Target end date:      INR check location:      Preferred lab:      Send INR reminders to:   [facility]    Indications    Chronic atrial fibrillation [I48.20]           Comments:            Anticoagulation Care  Providers     Provider Role Specialty Phone number    Enio Lechuga MD Referring Internal Medicine [phone]

## 2021-06-07 NOTE — TELEPHONE ENCOUNTER
RN cannot approve Refill Request    RN can NOT refill this medication med is not covered by policy/route to provider. Last office visit: Visit date not found Last Physical: Visit date not found Last MTM visit: Visit date not found Last visit same specialty: 12/24/2019 Enio Lechuga MD.  Next visit within 3 mo: Visit date not found  Next physical within 3 mo: Visit date not found      Sheyla Keene, [facility] Triage/Med Refill 3/24/2020    Requested Prescriptions   Pending Prescriptions Disp Refills     tiZANidine (ZANAFLEX) 4 MG tablet [Pharmacy Med Name: TIZANIDINE 4MG TABLETS] 30 tablet 1     Sig: TAKE 1 TABLET(4 MG) BY MOUTH TWICE DAILY AS NEEDED       There is no refill protocol information for this order

## 2021-06-07 NOTE — TELEPHONE ENCOUNTER
Refill Approved    Rx renewed per Medication Renewal Policy. Medication was last renewed on 1/13/20.    Blanca Meadows, [facility] Triage/Med Refill 4/9/2020     Requested Prescriptions   Pending Prescriptions Disp Refills     atorvastatin (LIPITOR) 40 MG tablet [Pharmacy Med Name: ATORVASTATIN 40MG TABLETS] 90 tablet 0     Sig: TAKE 1 TABLET BY MOUTH DAILY AT BEDTIME       Statins Refill Protocol (Hmg CoA Reductase Inhibitors) Passed - 4/8/2020  8:53 PM        Passed - PCP or prescribing provider visit in past 12 months      Last office visit with prescriber/PCP: 12/24/2019 Enio Lechuga MD OR same dept: 12/24/2019 Enio Lechuga MD OR same specialty: 12/24/2019 Enio Lechuga MD  Last physical: Visit date not found Last MTM visit: Visit date not found   Next visit within 3 mo: Visit date not found  Next physical within 3 mo: Visit date not found  Prescriber OR PCP: Enio Lechuga MD  Last diagnosis associated with med order: 1. Mixed hyperlipidemia  - atorvastatin (LIPITOR) 40 MG tablet [Pharmacy Med Name: ATORVASTATIN 40MG TABLETS]; TAKE 1 TABLET BY MOUTH DAILY AT BEDTIME  Dispense: 90 tablet; Refill: 0    If protocol passes may refill for 12 months if within 3 months of last provider visit (or a total of 15 months).

## 2021-06-07 NOTE — TELEPHONE ENCOUNTER
RN cannot approve Refill Request    RN can NOT refill this medication PCP messaged that patient is overdue for Labs. Last office visit: 12/24/2019 Enio Lechuga MD Last Physical: Visit date not found Last MTM visit: Visit date not found Last visit same specialty: 12/24/2019 Enio Lechuga MD.  Next visit within 3 mo: Visit date not found  Next physical within 3 mo: Visit date not found      Kacie Mayes, [facility] Triage/Med Refill 5/2/2020    Requested Prescriptions   Pending Prescriptions Disp Refills     metFORMIN (GLUCOPHAGE-XR) 500 MG 24 hr tablet [Pharmacy Med Name: METFORMIN ER 500MG 24HR TABS] 360 tablet 0     Sig: TAKE 2 TABLETS BY MOUTH TWICE DAILY WITH FOOD       Metformin Refill Protocol Failed - 5/1/2020  4:24 PM        Failed - Microalbumin in last year      No results found for: MICROALBUR               Passed - Blood pressure in last 12 months     BP Readings from Last 1 Encounters:   12/24/19 104/60             Passed - LFT or AST or ALT in last 12 months     Albumin   Date Value Ref Range Status   12/24/2019 3.6 3.5 - 5.0 g/dL Final     Bilirubin, Total   Date Value Ref Range Status   12/24/2019 0.4 0.0 - 1.0 mg/dL Final     Bilirubin, Direct   Date Value Ref Range Status   12/24/2019 0.2 <=0.5 mg/dL Final     Alkaline Phosphatase   Date Value Ref Range Status   12/24/2019 111 45 - 120 U/L Final     AST   Date Value Ref Range Status   12/24/2019 13 0 - 40 U/L Final     ALT   Date Value Ref Range Status   12/24/2019 19 0 - 45 U/L Final     Protein, Total   Date Value Ref Range Status   12/24/2019 7.2 6.0 - 8.0 g/dL Final                Passed - GFR or Serum Creatinine in last 6 months     GFR MDRD Non Af Amer   Date Value Ref Range Status   12/24/2019 >60 >60 mL/min/1.73m2 Final     GFR MDRD Af Amer   Date Value Ref Range Status   12/24/2019 >60 >60 mL/min/1.73m2 Final             Passed - Visit with PCP or prescribing provider visit in last 6 months or next 3 months     Last  office visit with prescriber/PCP: 12/24/2019 OR same dept: 12/24/2019 Enio Lechuga MD OR same specialty: 12/24/2019 Enio Lechuga MD Last physical: Visit date not found Last MTM visit: Visit date not found         Next appt within 3 mo: Visit date not found  Next physical within 3 mo: Visit date not found  Prescriber OR PCP: Enio Lechuga MD  Last diagnosis associated with med order: 1. Type 2 diabetes mellitus without complication, without long-term current use of insulin (H)  - metFORMIN (GLUCOPHAGE-XR) 500 MG 24 hr tablet [Pharmacy Med Name: METFORMIN ER 500MG 24HR TABS]; TAKE 2 TABLETS BY MOUTH TWICE DAILY WITH FOOD  Dispense: 360 tablet; Refill: 0    2. Muscle spasm  - tiZANidine (ZANAFLEX) 4 MG tablet [Pharmacy Med Name: TIZANIDINE 4MG TABLETS]; TAKE 1 TABLET(4 MG) BY MOUTH TWICE DAILY AS NEEDED  Dispense: 30 tablet; Refill: 1     If protocol passes may refill for 12 months if within 3 months of last provider visit (or a total of 15 months).           Passed - A1C in last 6 months     Hemoglobin A1c   Date Value Ref Range Status   12/24/2019 9.6 (H) 3.5 - 6.0 % Final                  tiZANidine (ZANAFLEX) 4 MG tablet [Pharmacy Med Name: TIZANIDINE 4MG TABLETS] 30 tablet 1     Sig: TAKE 1 TABLET(4 MG) BY MOUTH TWICE DAILY AS NEEDED       There is no refill protocol information for this order

## 2021-06-08 NOTE — TELEPHONE ENCOUNTER
Who is calling:  Patient   Reason for Call:  Please return patient call - his went to do not disturb by accident   Date of last appointment with primary care: n/a  Okay to leave a detailed message: Yes

## 2021-06-08 NOTE — TELEPHONE ENCOUNTER
ANTICOAGULATION  MANAGEMENT    Assessment     Today's INR result of 2.20 is Therapeutic (goal INR of 2.0-3.0)        Warfarin taken as previously instructed    No new diet changes affecting INR    No new medication/supplements affecting INR    Continues to tolerate warfarin with no reported s/s of bleeding or thromboembolism     Previous INR was Subtherapeutic at 1.90 on 5/7/20.    Plan:     Spoke with Pedro Luis regarding INR result and instructed:     Warfarin Dosing Instructions:  (evenings. has 5mg tabs)    Continue current warfarin dose 7.5 mg daily on Thursdays; and 5 mg daily rest of week.    Instructed patient to follow up no later than:  2 wks.   -  INR scheduled on 6/5/20 @ [facility].    Education provided: target INR goal and significance of current INR result    Pedro Luis verbalizes understanding and agrees to warfarin dosing plan.    Instructed to call the [facility] for any changes, questions or concerns. (#[phone])   ?   Christelle Huerta RN    Subjective/Objective:      Obi Messer, a 64 y.o. male is on warfarin.     Obi reports:     Home warfarin dose: as updated on anticoagulation calendar per template     Missed doses: No     Medication changes:  No     S/S of bleeding or thromboembolism:  No     New Injury or illness:  No     Changes in diet or alcohol consumption:  No     Upcoming surgery, procedure or cardioversion:  No    Anticoagulation Episode Summary     Current INR goal:   2.0-3.0   TTR:   80.6 % (4.7 mo)   Next INR check:   6/5/2020   INR from last check:   2.20 (5/22/2020)   Weekly max warfarin dose:      Target end date:      INR check location:      Preferred lab:      Send INR reminders to:   [facility]    Indications    Chronic atrial fibrillation [I48.20]           Comments:            Anticoagulation Care Providers     Provider Role Specialty Phone number    Enio Lechuga MD Referring Internal Medicine [phone]

## 2021-06-08 NOTE — TELEPHONE ENCOUNTER
ANTICOAGULATION  MANAGEMENT    Assessment     Today's INR result of 2.50 is Therapeutic (goal INR of 2.0-3.0)        Warfarin taken as previously instructed    No new diet changes affecting INR    No interaction expected between Glipizide dose decreased  and warfarin    Continues to tolerate warfarin with no reported s/s of bleeding or thromboembolism     Previous INR was Therapeutic at 2.20 on 5/22/20.    Reported doing well with no new concerns.    Plan:     Spoke with Pedro Luis regarding INR result and instructed:     Warfarin Dosing Instructions:  (evenings. has 5mg tabs)   - Continue current warfarin dose 7.5 mg daily on Thursdays; and 5 mg daily rest of week.    Instructed patient to follow up no later than:  4 wks.    Education provided: target INR goal and significance of current INR result, no interaction anticipated between warfarin and Glipizide and importance of notifying clinic for changes in medications    Pedro Luis verbalizes understanding and agrees to warfarin dosing plan.    Instructed to call the [facility] for any changes, questions or concerns. (#[phone])   ?   Christelle Huerta RN    Subjective/Objective:      Obi Messer, a 64 y.o. male is on warfarin.     Obi reports:     Home warfarin dose: as updated on anticoagulation calendar per template     Missed doses: No     Medication changes:  Yes:  Reported Glipizide dose decreased.     S/S of bleeding or thromboembolism:  No     New Injury or illness:  No     Changes in diet or alcohol consumption:  No     Upcoming surgery, procedure or cardioversion:  No    Anticoagulation Episode Summary     Current INR goal:   2.0-3.0   TTR:   83.6 % (5.5 mo)   Next INR check:   7/15/2020   INR from last check:   2.50 (6/17/2020)   Weekly max warfarin dose:      Target end date:      INR check location:      Preferred lab:      Send INR reminders to:   [facility]    Indications    Chronic atrial fibrillation (H) [I48.20]           Comments:             Anticoagulation Care Providers     Provider Role Specialty Phone number    Enio Lechuga MD Referring Internal Medicine [phone]

## 2021-06-08 NOTE — TELEPHONE ENCOUNTER
Prior Authorization Request  Who s requesting:  Patient  Pharmacy Name and Location: [facility] [place]   Medication Name: Androgel 1.62 %   Insurance Plan: [employer] Part D   Insurance Member ID Number:  [id number]   CoverMyMeds Key: N/A  Informed patient that prior authorizations can take up to 10 business days for response:   Yes  Okay to leave a detailed message: Yes    Rationale:   Documented low testosterone levels x 2. Working closely with MTM. Have eliminated other medications that can cause low testosterone levels (Spironolactone). Despite eliminating, levels continue to be below normal limits with continued symptoms of fatigue, low libido, low mood, decreased body mass.

## 2021-06-08 NOTE — TELEPHONE ENCOUNTER
Central PA team  [phone]  Pool: HE PA MED ([id number])          PA has been initiated.       PA form completed and faxed insurance via Cover My Meds     Key:  [id number] - PA Case ID: [id number]     Medication:  Testosterone 20.25 MG/1.25GM(1.62%) gel    Insurance:  [employer]        Response will be received via fax and may take up to 5-10 business days depending on plan

## 2021-06-08 NOTE — PROGRESS NOTES
"Obi Messer is a 64 y.o. male who is being evaluated via a billable video visit.      The patient has been notified of following:     \"This video visit will be conducted via a call between you and your physician/provider. We have found that certain health care needs can be provided without the need for an in-person physical exam.  This service lets us provide the care you need with a video conversation.  If a prescription is necessary we can send it directly to your pharmacy.  If lab work is needed we can place an order for that and you can then stop by our lab to have the test done at a later time.    Video visits are billed at different rates depending on your insurance coverage. Please reach out to your insurance provider with any questions.    If during the course of the call the physician/provider feels a video visit is not appropriate, you will not be charged for this service.\"    Patient has given verbal consent to a Video visit? Yes    Patient would like to receive their AVS by AVS Preference: Deni.    Patient would like the video invitation sent by: Text to cell phone: [phone]    Will anyone else be joining your video visit? No        Video Start Time: 4:05 PM    Additional provider notes: Video visit with Obi for follow-up primarily of his diabetes.  Was found he had low blood sugar in the mid 50s when he came for his outpatient labs.  Called him last Friday or 4 days ago because of low blood sugar Was advised  to decrease his metformin to 500 mg twice a day from 1000 mg twice a day and  his Novolin to 10 units twice a day from 15 units twice a day.  Continues to take glipizide 10 mg twice a day.  Reports his blood sugar runs in the mid 120s now  but there were times it runs in the 150s to 160s.  His A1c is 6.7 which is really good.  But with this he  experiences low blood sugar.  Also was tested for testosterone and the result is still pending.  Has chronic atrial fibrillation,  on warfarin.  " His INR was therapeutic.  Had rest of his labs done specifically his kidney function which were all normal.  Complains of recurring left shoulder pains due to bursitis  with underlying osteoarthritis.  Wants to resume taking his hydrocodone with acetaminophen in the meantime which he took for his chronic right knee pains. He cannot get cortisone injection yet for his left shoulder pains.  Obese based on his BMI.  But overall, reports he is feeling well.    1. Type 2 diabetes mellitus without complication, without long-term current use of insulin (H)  Does not get hypoglycemic episodes anymore.  But now shows intermittent increased of blood sugar in the 150s to 160s after his insulin and metformin were decreased.  Still continues to take glipizide.  Advised to increase back his insulin  from 10 units to 15 units twice a day and maintain metformin 500 mg twice a day together with glipizide 10 mg twice a day.  Advised to continue to check his blood sugar twice a day and record until I do another follow-up video visit with him in 2 weeks.    2. Chronic atrial fibrillation  INR is therapeutic.  Continue same dose of warfarin as dosed by the warfarin clinic.    3. Essential hypertension  Controlled.  Continue furosemide, lisinopril and metoprolol.    4. Mixed hyperlipidemia  Controlled.  Continue atorvastatin.    5. Chronic pain of right knee,s/p knee replacement, 2017  Now has left shoulder pains due to bursitis with underlying osteoarthritis.  Okay to resume hydrocodone with acetaminophen which he takes for his chronic right knee pains.  Cannot get cortisone injection to his left shoulder yet.  - HYDROcodone-acetaminophen 5-325 mg per tablet; Take 1 tablet by mouth 3 (three) times a day.  Dispense: 30 tablet; Refill: 0    6. Morbid obesity (H)  Obese based on his BMI.    Discussed lab results done including his CMP, A1c.  Testosterone result is  still pending.  Advised his kidney function is back to normal.         Video-Visit Details    Type of service:  Video Visit    Video End Time (time video stopped): 4:33 PM  Originating Location (pt. Location): Home    Distant Location (provider location):  [facility] INTERNAL MEDICINE     Platform used for Video Visit: Yudi Lechuga MD

## 2021-06-08 NOTE — PROGRESS NOTES
"Obi Messer is a 64 y.o. male who is being evaluated via a billable telephone visit.      The patient has been notified of following:     \"This telephone visit will be conducted via a call between you and your physician/provider. We have found that certain health care needs can be provided without the need for a physical exam.  This service lets us provide the care you need with a short phone conversation.  If a prescription is necessary we can send it directly to your pharmacy.  If lab work is needed we can place an order for that and you can then stop by our lab to have the test done at a later time.    Telephone visits are billed at different rates depending on your insurance coverage. During this emergency period, for some insurers they may be billed the same as an in-person visit.  Please reach out to your insurance provider with any questions.    If during the course of the call the physician/provider feels a telephone visit is not appropriate, you will not be charged for this service.\"    Patient has given verbal consent to a Telephone visit? Yes    What phone number would you like to be contacted at? [phone]     Patient would like to receive their AVS by AVS Preference: Deni.    Additional provider notes: Phone visit with  Pedro Luis for follow-up of  his chronic atrial fibrillation, diabetes and hypogonadism.  Takes warfarin.  Last INR was therapeutic at 2.2.  Due to have his INR rechecked.  Saw our Pharm.D. for his diabetes.  Was advised to continue with his NovoLog but increase it to 12 units twice a day and decrease glipizide from 20 mg twice a day to 10 mg twice a day.  Was also advised to take his postprandial blood sugar once a day together with his fasting blood sugar and record until he follows with Pharm.D.  Has hypogonadism.  Still showed low testosterone level despite stopping spironolactone. Recommended to start AndroGel daily but will get his AndroGel preauthorized first.  Overall, he " reports he is doing and feeling well.    Assessment/Plan:  1. Chronic atrial fibrillation  Continue warfarin.  Due for INR.    2. Type 2 diabetes mellitus without complication, without long-term current use of insulin (H)  Follow Pharm.D. recommendations to check his blood sugar 2 hours postprandial and fasting blood sugar once a day, to decrease glipizide to 10 mg twice a day and increase NPH to 12 units twice a day and continue metformin 1000 mg twice a day.    3. Hypogonadism male  To start AndroGel daily once  this is approved.      To follow-up with Pharm.D. in 2 weeks by phone visit.        Phone call duration: 12 minutes    Veronika Guerrero LPN

## 2021-06-08 NOTE — TELEPHONE ENCOUNTER
Prior Authorization Request  Who s requesting:  Pharmacy  Pharmacy Name and Location: [facility] [address]   Medication Name: Testosterone 1.62% Gel Pump  Insurance Plan:   Insurance Member ID Number:  [id number]  CoverMyMeds Key: N/A  Informed patient that prior authorizations can take up to 10 business days for response:   NA  Okay to leave a detailed message: Yes

## 2021-06-08 NOTE — TELEPHONE ENCOUNTER
Spoke with patient. Relayed recommendations below from Md,     Slightly decreased testosterone. I like you to see our pharm D to discuss possible treatment options. Otherwise, as we discussed all your other labs are good except for low blood sugar. Thanks.    Patient stated he has an appointment with Pharm D, and was just awaiting results.     Veronika Guerrero LPN

## 2021-06-08 NOTE — TELEPHONE ENCOUNTER
[facility]. Has a critical lab value to report. Please call her back at [phone].    Initially parked call in monik, but no one answered calls to [facility].

## 2021-06-08 NOTE — TELEPHONE ENCOUNTER
Who is calling:  Pedro Luis  Reason for Call:  Patient returning INR call  Date of last appointment with primary care: 4/6/2020  Okay to leave a detailed message: Yes

## 2021-06-08 NOTE — TELEPHONE ENCOUNTER
ANTICOAGULATION  MANAGEMENT    Assessment     Today's INR result of 1.90 is Subtherapeutic (goal INR of 2.0-3.0)        Warfarin taken as previously instructed    - reported not missing any warfarin doses.    No new diet changes affecting INR    No new medication/supplements affecting INR    Continues to tolerate warfarin with no reported s/s of bleeding or thromboembolism     Previous INR was Subtherapeutic at 1.60 on 4/21/20.    Plan:     Spoke with Obi regarding INR result and instructed:    1.  INR still subtherapeutic.  Will increase wkly warfarin dose, to ensure INR stability.    Warfarin Dosing Instructions:  (evenings. has 5mg tabs)   - Change warfarin dose to 7.5 mg daily on Thursdays; and 5 mg daily rest of week.   - (7.1 % change)    Instructed patient to follow up no later than:  1-2 wks.   - INR scheduledo n 5/21/20 @ [facility].    Education provided: importance of consistent vitamin K intake and target INR goal and significance of current INR result    Pedro Luis verbalizes understanding and agrees to warfarin dosing plan.    Instructed to call the [facility] for any changes, questions or concerns. (#[phone])   ?   Christelle Huerta RN    Subjective/Objective:      Obi Messer, a 64 y.o. male is on warfarin.     Obi reports:     Home warfarin dose: as updated on anticoagulation calendar per template     Missed doses: No     Medication changes:  No     S/S of bleeding or thromboembolism:  No     New Injury or illness:  No     Changes in diet or alcohol consumption:  No     Upcoming surgery, procedure or cardioversion:  No    Anticoagulation Episode Summary     Current INR goal:   2.0-3.0   TTR:   82.2 % (4.2 mo)   Next INR check:   5/21/2020   INR from last check:   1.90! (5/7/2020)   Weekly max warfarin dose:      Target end date:      INR check location:      Preferred lab:      Send INR reminders to:   [facility]    Indications    Chronic atrial fibrillation [I48.20]            Comments:            Anticoagulation Care Providers     Provider Role Specialty Phone number    Enio Lechuga MD Referring Internal Medicine [phone]

## 2021-06-08 NOTE — PROGRESS NOTES
MTM Follow Up Encounter  Assessment & Plan                                                       Chronic Atrial Fibrillation/Hypertension/CHF/CAD: Partially improved- Some continued hypotension despite stopping Spironolactone and reducing lisinopril.  -Continue follow-up with [facility] RN for BP management   -Recheck kidney function     Fatigue/Low Testosterone: Partially improved- Spironolactone discontinued. This can contribute to low testosterone levels. Now that patient is off, will repeat levels. Time out with next INR. Recommended to be drawn in the morning, fasting. As pt has already had 1 low result before, if this result is low, will repeat once more before initiating replacement therapy. Reviewed alcohol can lower testosterone levels. Recommended reducing alcohol intake.   -Testosterone labs - drawn in the morning, fasting   -Future Consideration: If proceeding with Testosterone replacement, consider topical testosterone 1.62% 2 pumps daily - titrate to 1-4 pump actuations based on predose morning serum testosterone concentration drawn after 14-28 days.       Diabetes: Needs improvement - A1C above goal <8%. Due for repeat. fasting sugars mostly at goal , and prandial sugars have elevated above goal <180 due to diet. Patient wants to work on diet and exercise over the next couple of weeks which is reasonable given his blood sugars were fairly well controlled at last MTM visit.   -Continue current therapy  -Pt to reduce carb intake (potatoes and pasta)   -Increase exercise    -Repeat A1c       Follow Up  Return in 2 weeks (on 5/22/2020) for Medication Management Pharmacist, Via Phone.  Labs 5/21     Subjective & Objective                                                     Obi Messer is a 64 y.o. male coming in for an initial visit for Medication Therapy Management. He was referred to me from Enio Lechuga MD.     Patient consented to a telehealth visit: Yes      Chief Complaint:  Medication Management - interested in testosterone therapy     Medication Adherence/Access: Having problems with test strips. Spoke with [facility] on Friday - does not have test strips on file.        Chronic Atrial Fibrillation/Hypertension/ CHF/ CAD: Prescribed Atorvastatin 40 mg daily, Digoxin 250 mcg daily, Metoprolol Tartrate 100 mg two times a day, Lisinopril 20 mg daily, Furosemide 40 mg daily, and warfarin 5 mg as instructed by [facility] anticoag team.     Per 3/4 Cardiology visit (Care Everywhere), Spironolactone was discontinued. On 3/12, continued to have low BP so Lisinopril was decreased to 20 mg daily.      2/22/18 Echo: EF improved to 50-55% from 25% in 2012     106/76   101/76   96/69   97/63   95/72   84/60  10/85    Heart rates have been ranging     Continuing to have dizziness lightheadedness.   Has a nurse through [facility] who's following up on BP.       BP Readings from Last 3 Encounters:   12/24/19 104/60   09/09/19 136/83   08/28/19 112/64     Pulse Readings from Last 3 Encounters:   12/24/19 75   09/09/19 78   08/28/19 89         Fatigue/Low Testosterone: Symptoms mostly unchanged since last MTM visit, despite stopping spironolactone.     Hasn't had a drink over the last week but does still drink occassionally.     Persistent fatigue. Gets hot flashes and sweats. Loss of body hair, muscle mass. Libido is non-existent.  Sweating and hot flashes are the most bothersome.        Ref. Range 12/24/2019 11:55   Free Testosterone Calc Latest Ref Range: 4.7 - 24.4 ng/dL 3.54 (L)   Patient Fasting > 8hrs? Unknown Unknown   Sex Hormone Bind Globulin Latest Ref Range: 11 - 80 nmol/L 44   TESTOSTERONE, FREE AND TOTAL (BTEST) Unknown Rpt (!)   Testosterone, Total Latest Ref Range: 240 - 950 ng/dL 222 (L)         Lab Results   Component Value Date    WBC 13.2 (H) 12/24/2019    HGB 14.1 12/24/2019    HCT 43.2 12/24/2019    MCV 91 12/24/2019     12/24/2019     Lab Results   Component Value  Date    CHOL 108 2019    CHOL 105 2019    CHOL 124 2016     Lab Results   Component Value Date    HDL 33 (L) 2019    HDL 38 (L) 2019    HDL 34 (L) 2016     Lab Results   Component Value Date    LDLCALC 51 2019    LDLCALC 45 2019    LDLCALC 60 2016     Lab Results   Component Value Date    TRIG 120 2019    TRIG 108 2019    TRIG 150 (H) 2016     No components found for: CHOLHDL        Diabetes:  Pt currently prescribed Glipizide 10 mg ER 2 tabs two times a day, Metformin 500 mg ER 2 tabs two times a day, and Novolin NPH 15 units two times a day.      patient is not currently experiencing side effects.        SMBG: twice daily    Ranges (patient reported) :     Fastin, 125, 122, 121   Prandial: 190 last night, 74*, 189, 184, 157, 268, 195, 136, 104, 144, 146   *Crossover to a new bottle of test strips.      Patient is not experiencing hypoglycemia   Recent symptoms of high blood sugar? Polyuria continues due to Furosemide. Still having polyuria, but doesn't feel this is due to diabetes. .   ACEi/ARB:  Lisinopril 40 mg daily   Statin: Atorvastatin 40 mg daily   Aspirin: Not taking    Diet: Notes that he's been eating more carbs recently. Made a potato based hot dish and saw numbers increase. Had cravings for pasta and saw numbers increase. Acknowledges diet has worsened BG.     Exercise was going well while he had a job. Had a bacterial infection that had symptoms similar to COVID19. 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 Hasn't been getting out much recently.     Lab Results   Component Value Date    HGBA1C 9.6 (H) 2019    HGBA1C 8.8 (H) 2019    HGBA1C 7.9 (H) 2016     Lab Results   Component Value Date    LDLCALC 51 2019    CREATININE 0.83 2019             PMH: reviewed in EPIC   Allergies/ADRs: reviewed in EPIC   Alcohol:   Social History     Substance and Sexual Activity   Alcohol Use Yes     Comment: 2/week       Tobacco:   Social History     Tobacco Use   Smoking Status Former Smoker     Last attempt to quit: 2014     Years since quittin.3   Smokeless Tobacco Current User     Today's Vitals: There were no vitals filed for this visit.  ----------------    Much or all of the text in this note was generated through the use of Dragon Dictate voice-to-text software. Errors in spelling or words which seem out of context are unintentional. Sound alike errors, in particular, may have escaped editing.    The patient was given a summary of these recommendations by mail    I spent 30 minutes with this patient today.   All changes were made via collaborative practice agreement with Enio Lechuga MD. A copy of the visit note was provided to the patient's provider.     Kervin JaureguiD  Medication Therapy Management (MTM) Pharmacist  [facility]      Telemedicine Visit Details    Type of service:  Telephone     Start Time: 2:02 PM  End Time (time video/phone call stopped): 2:28 PM    Originating Location (pt. Location): Home    Distant Location (provider location):  [facility] MEDICATION THERAPY MANAGEMENT [facility]     Mode of Communication:   Telephone        Current Outpatient Medications   Medication Sig Dispense Refill     acetaminophen (TYLENOL) 500 MG tablet Take 1,000 mg by mouth every 6 (six) hours as needed for pain.       atorvastatin (LIPITOR) 40 MG tablet TAKE 1 TABLET BY MOUTH DAILY AT BEDTIME 90 tablet 2     blood glucose test strips Use 1 each As Directed 2 (two) times a day. Dispense brand per patient's insurance at pharmacy discretion. 100 strip 3     digoxin (LANOXIN) 250 mcg tablet Take 1 tablet (250 mcg total) by mouth daily. 90 tablet 3     DULoxetine (CYMBALTA) 30 MG capsule Take 30 mg by mouth daily. ALONG WITH 60MG DOSE, TOTAL 90MG DAILY              DULoxetine (CYMBALTA) 60 MG capsule Take 1 capsule by mouth daily. ALONG WITH THE 30MG FOR A  "TOTAL DAILY DOSE OF 90MG  1     furosemide (LASIX) 40 MG tablet TAKE 1 TABLET BY MOUTH DAILY 90 tablet 2     glipiZIDE (GLUCOTROL XL) 10 MG 24 hr tablet TAKE 2 TABLETS BY MOUTH TWICE DAILY 30 MINUTES BEFORE EATING 360 tablet 0     HYDROcodone-acetaminophen 5-325 mg per tablet Take 1 tablet by mouth 3 (three) times a day. 20 tablet 0     insulin syringe-needle U-100 (BD INSULIN SYRINGE ULTRA-FINE) 1/2 mL 31 gauge x 15/64\" Syrg Use 1 each As Directed 2 (two) times a day. 100 Syringe 6     lisinopril (PRINIVIL,ZESTRIL) 40 MG tablet Take 20 mg by mouth daily.        metFORMIN (GLUCOPHAGE-XR) 500 MG 24 hr tablet TAKE 2 TABLETS BY MOUTH TWICE DAILY WITH FOOD 360 tablet 0     metoprolol tartrate (LOPRESSOR) 100 MG tablet Take 100 mg by mouth 2 (two) times a day.       NOVOLIN N NPH U-100 INSULIN 100 unit/mL injection INJECT 25 UNITS TWICE DAILY. INCREASE AS DIRECTED, UP  UNITS PER DAY. 20 mL 6     polyvinyl alcohol (LIQUIFILM TEARS) 1.4 % ophthalmic solution Administer 1 drop to both eyes as needed for dry eyes.       QUEtiapine (SEROQUEL) 100 MG tablet Take 200 mg by mouth at bedtime.        QUEtiapine (SEROQUEL) 25 MG tablet Take 25 mg by mouth 3 (three) times a day. Morning, afternoon and evening       tiZANidine (ZANAFLEX) 4 MG tablet TAKE 1 TABLET(4 MG) BY MOUTH TWICE DAILY AS NEEDED 30 tablet 1     topiramate (TOPAMAX) 25 MG tablet Take 25 mg by mouth 2 (two) times a day.       warfarin ANTICOAGULANT (COUMADIN/JANTOVEN) 5 MG tablet Take 1 tablet (5mg) by mouth daily, as directed.  Adjust dose based on INR results. 100 tablet 1     No current facility-administered medications for this visit.        "

## 2021-06-08 NOTE — TELEPHONE ENCOUNTER
RN cannot approve Refill Request    RN can NOT refill this medication PCP messaged that patient is overdue for Labs. Last office visit: 12/24/2019 Enio Lechuga MD Last Physical: Visit date not found Last MTM visit: Visit date not found Last visit same specialty: 12/24/2019 Enio Lechuga MD.  Next visit within 3 mo: Visit date not found  Next physical within 3 mo: Visit date not found      Kacie Mayes, [facility] Triage/Med Refill 5/25/2020    Requested Prescriptions   Pending Prescriptions Disp Refills     furosemide (LASIX) 40 MG tablet [Pharmacy Med Name: FUROSEMIDE 40MG TABLETS] 90 tablet 2     Sig: TAKE 1 TABLET BY MOUTH DAILY       Diuretics/Combination Diuretics Refill Protocol  Passed - 5/24/2020  6:06 AM        Passed - Visit with PCP or prescribing provider visit in past 12 months     Last office visit with prescriber/PCP: 12/24/2019 Enio Lechuga MD OR same dept: 12/24/2019 Enio Lechuga MD OR same specialty: 12/24/2019 Enio Lechuga MD  Last physical: Visit date not found Last MTM visit: Visit date not found   Next visit within 3 mo: Visit date not found  Next physical within 3 mo: Visit date not found  Prescriber OR PCP: Enio Lechuga MD  Last diagnosis associated with med order: 1. Edema, unspecified type  - furosemide (LASIX) 40 MG tablet [Pharmacy Med Name: FUROSEMIDE 40MG TABLETS]; TAKE 1 TABLET BY MOUTH DAILY  Dispense: 90 tablet; Refill: 2    2. Chronic atrial fibrillation  - warfarin ANTICOAGULANT (COUMADIN/JANTOVEN) 5 MG tablet [Pharmacy Med Name: WARFARIN SOD 5MG TABLETS (PEACH)]; TAKE 1 TABLET(5 MG) BY MOUTH DAILY AS DIRECTED. ADJUST DOSE BASED ON INR RESULTS  Dispense: 100 tablet; Refill: 1  - digoxin (LANOXIN) 250 mcg (0.25 mg) tablet [Pharmacy Med Name: DIGOXIN 0.25MG TABLETS (WHITE)]; TAKE 1 TABLET(250 MCG) BY MOUTH DAILY  Dispense: 90 tablet; Refill: 3    3. Long term (current) use of anticoagulants  - warfarin ANTICOAGULANT (COUMADIN/JANTOVEN)  5 MG tablet [Pharmacy Med Name: WARFARIN SOD 5MG TABLETS (PEACH)]; TAKE 1 TABLET(5 MG) BY MOUTH DAILY AS DIRECTED. ADJUST DOSE BASED ON INR RESULTS  Dispense: 100 tablet; Refill: 1    If protocol passes may refill for 12 months if within 3 months of last provider visit (or a total of 15 months).             Passed - Serum Potassium in past 12 months      Lab Results   Component Value Date    Potassium 3.5 05/22/2020             Passed - Serum Sodium in past 12 months      Lab Results   Component Value Date    Sodium 139 05/22/2020             Passed - Blood pressure on file in past 12 months     BP Readings from Last 1 Encounters:   12/24/19 104/60             Passed - Serum Creatinine in past 12 months      Creatinine   Date Value Ref Range Status   05/22/2020 0.84 0.70 - 1.30 mg/dL Final                warfarin ANTICOAGULANT (COUMADIN/JANTOVEN) 5 MG tablet [Pharmacy Med Name: WARFARIN SOD 5MG TABLETS (PEACH)] 100 tablet 1     Sig: TAKE 1 TABLET(5 MG) BY MOUTH DAILY AS DIRECTED. ADJUST DOSE BASED ON INR RESULTS       Warfarin Refill Protocol  Failed - 5/24/2020  6:06 AM        Failed -  Route to appropriate pool/provider     Last Anticoagulation Summary:   Anticoagulation Episode Summary     Current INR goal:   2.0-3.0   TTR:   80.6 % (4.7 mo)   Next INR check:   6/5/2020   INR from last check:   2.20 (5/22/2020)   Weekly max warfarin dose:      Target end date:      INR check location:      Preferred lab:      Send INR reminders to:   [facility]    Indications    Chronic atrial fibrillation [I48.20]           Comments:            Anticoagulation Care Providers     Provider Role Specialty Phone number    Enio Lechuga MD Referring Internal Medicine [phone]                Passed - Provider visit in last year     Last office visit with prescriber/PCP: 12/24/2019 Enio Lechuga MD OR same dept: 12/24/2019 Enio Lechuga MD OR same specialty: 12/24/2019 Enio Lechuga MD  Last  physical: Visit date not found Last MTM visit: Visit date not found    Next appt within 3 mo: Visit date not found Next physical within 3 mo: Visit date not found  Prescriber OR PCP: Enio Lechuga MD  Last diagnosis associated with med order: 1. Edema, unspecified type  - furosemide (LASIX) 40 MG tablet [Pharmacy Med Name: FUROSEMIDE 40MG TABLETS]; TAKE 1 TABLET BY MOUTH DAILY  Dispense: 90 tablet; Refill: 2    2. Chronic atrial fibrillation  - warfarin ANTICOAGULANT (COUMADIN/JANTOVEN) 5 MG tablet [Pharmacy Med Name: WARFARIN SOD 5MG TABLETS (PEACH)]; TAKE 1 TABLET(5 MG) BY MOUTH DAILY AS DIRECTED. ADJUST DOSE BASED ON INR RESULTS  Dispense: 100 tablet; Refill: 1  - digoxin (LANOXIN) 250 mcg (0.25 mg) tablet [Pharmacy Med Name: DIGOXIN 0.25MG TABLETS (WHITE)]; TAKE 1 TABLET(250 MCG) BY MOUTH DAILY  Dispense: 90 tablet; Refill: 3    3. Long term (current) use of anticoagulants  - warfarin ANTICOAGULANT (COUMADIN/JANTOVEN) 5 MG tablet [Pharmacy Med Name: WARFARIN SOD 5MG TABLETS (PEACH)]; TAKE 1 TABLET(5 MG) BY MOUTH DAILY AS DIRECTED. ADJUST DOSE BASED ON INR RESULTS  Dispense: 100 tablet; Refill: 1    If protocol passes may refill for 6 months if within 3 months of last provider visit (or a total of 9 months).             digoxin (LANOXIN) 250 mcg (0.25 mg) tablet [Pharmacy Med Name: DIGOXIN 0.25MG TABLETS (WHITE)] 90 tablet 3     Sig: TAKE 1 TABLET(250 MCG) BY MOUTH DAILY       Refill Protocol for Digoxin Failed - 5/24/2020  6:06 AM        Failed - ECG in last 12 months     ECG rhythm strip: No results found for this or any previous visit. ECG 12 lead MUSE:   Results for orders placed or performed during the hospital encounter of 12/05/17   ECG 12 lead   Result Value Ref Range    SYSTOLIC BLOOD PRESSURE  mmHg    DIASTOLIC BLOOD PRESSURE  mmHg    VENTRICULAR RATE 104 BPM    ATRIAL RATE 89 BPM    P-R INTERVAL  ms    QRS DURATION 90 ms    Q-T INTERVAL 346 ms    QTC CALCULATION (BEZET) 454 ms    P Axis  degrees     R AXIS 39 degrees    T AXIS 233 degrees    MUSE DIAGNOSIS       Atrial fibrillation with rapid ventricular response  Nonspecific ST and T wave abnormality  Abnormal ECG  No previous ECGs available  Confirmed by ALEXANDER CHOUDHURY MD LOC: ([id number]) on 12/5/2017 3:06:16 PM      ECG 12 lead nursing unit: No results found for this or any previous visit.          Failed - Magnesium in last 12 months     No results found for: MG           Passed - LFT or AST or ALT on file in last 12 months     Albumin   Date Value Ref Range Status   05/22/2020 3.8 3.5 - 5.0 g/dL Final     Bilirubin, Total   Date Value Ref Range Status   05/22/2020 0.5 0.0 - 1.0 mg/dL Final     Bilirubin, Direct   Date Value Ref Range Status   12/24/2019 0.2 <=0.5 mg/dL Final     Alkaline Phosphatase   Date Value Ref Range Status   05/22/2020 97 45 - 120 U/L Final     AST   Date Value Ref Range Status   05/22/2020 18 0 - 40 U/L Final     ALT   Date Value Ref Range Status   05/22/2020 21 0 - 45 U/L Final     Protein, Total   Date Value Ref Range Status   05/22/2020 6.9 6.0 - 8.0 g/dL Final                Passed - PCP or prescribing provider visit in past 6 months or next 3 months     Last office visit with prescriber/PCP: 12/24/2019 OR same dept: 12/24/2019 Enio Lechuga MD OR same specialty: 12/24/2019 Enio Lechuga MD Last physical: Visit date not found  Last MTM visit: Visit date not found     Next appt within 3 mo: Visit date not found  Next physical within 3 mo: Visit date not found  Prescriber OR PCP: Enio Lechuga MD  Last diagnosis associated with med order: 1. Edema, unspecified type  - furosemide (LASIX) 40 MG tablet [Pharmacy Med Name: FUROSEMIDE 40MG TABLETS]; TAKE 1 TABLET BY MOUTH DAILY  Dispense: 90 tablet; Refill: 2    2. Chronic atrial fibrillation  - warfarin ANTICOAGULANT (COUMADIN/JANTOVEN) 5 MG tablet [Pharmacy Med Name: WARFARIN SOD 5MG TABLETS (PEACH)]; TAKE 1 TABLET(5 MG) BY MOUTH DAILY AS DIRECTED. ADJUST DOSE BASED  ON INR RESULTS  Dispense: 100 tablet; Refill: 1  - digoxin (LANOXIN) 250 mcg (0.25 mg) tablet [Pharmacy Med Name: DIGOXIN 0.25MG TABLETS (WHITE)]; TAKE 1 TABLET(250 MCG) BY MOUTH DAILY  Dispense: 90 tablet; Refill: 3    3. Long term (current) use of anticoagulants  - warfarin ANTICOAGULANT (COUMADIN/JANTOVEN) 5 MG tablet [Pharmacy Med Name: WARFARIN SOD 5MG TABLETS (PEACH)]; TAKE 1 TABLET(5 MG) BY MOUTH DAILY AS DIRECTED. ADJUST DOSE BASED ON INR RESULTS  Dispense: 100 tablet; Refill: 1     If protocol passes may refill for 6 months if within 3 months of last provider visit (or a total of 9 months).          Passed - BMP on file in last 12 months     Sodium   Date Value Ref Range Status   05/22/2020 139 136 - 145 mmol/L Final     Potassium   Date Value Ref Range Status   05/22/2020 3.5 3.5 - 5.0 mmol/L Final     Chloride   Date Value Ref Range Status   05/22/2020 104 98 - 107 mmol/L Final     CO2   Date Value Ref Range Status   05/22/2020 19 (L) 22 - 31 mmol/L Final     BUN   Date Value Ref Range Status   05/22/2020 19 8 - 22 mg/dL Final     Creatinine   Date Value Ref Range Status   05/22/2020 0.84 0.70 - 1.30 mg/dL Final             Passed - CBC w/plts (hm2) on file in last 12 months     WBC   Date Value Ref Range Status   12/24/2019 13.2 (H) 4.0 - 11.0 thou/uL Final     Hemoglobin   Date Value Ref Range Status   12/24/2019 14.1 14.0 - 18.0 g/dL Final     Hematocrit   Date Value Ref Range Status   12/24/2019 43.2 40.0 - 54.0 % Final     Platelets   Date Value Ref Range Status   12/24/2019 259 140 - 440 thou/uL Final             Passed - Normal digoxin level in last 12 months     No results found for: PHENOBARB          Passed - Serum creatinine in last 12 months     Creatinine   Date Value Ref Range Status   05/22/2020 0.84 0.70 - 1.30 mg/dL Final

## 2021-06-08 NOTE — TELEPHONE ENCOUNTER
Called lab. Had low fasting blood sugar. Called patient. Reports he has been having low blood sugars in the 60 for the last 2 months. Advised to decrease insulin to 10 units twice a day and metformin to 500 mg twice a day. Will do a follow up virtual visit next week.

## 2021-06-08 NOTE — TELEPHONE ENCOUNTER
ANTICOAGULATION  MANAGEMENT PROGRAM    Obi Messer is overdue for INR check.     Spoke with Pedro Luis and scheduled INR appointment on 6/12/20 @ [facility].      Christelle Huerta RN

## 2021-06-08 NOTE — PROGRESS NOTES
MTM Follow Up Encounter  Assessment & Plan                                                       Chronic Atrial Fibrillation/Hypertension/CHF/CAD: Unable to assess - call was disconnected before we could discuss. Attempted to call pt back but no answer. Does have appointment with PCP later today.   -Continue current therapy     Fatigue/Low Testosterone: Needs improvement - testosterone levels have increased slightly, likely from stopping spironolactone, but still low and pt still having symptoms of fatigue, hot flashes, low libido. Likely appropriate to start testosterone replacement therapy. Topical applications are the easiest to apply. Reviewed applying to shoulder/upper arms, others should avoid skin to skin contact of these areas etc. Reviewed potential adverse effects - application site reactions, changes to mood, potential for increased LFTS and trigs. Would likely need to monitor repeat testosterone, lipids, and LFTS in 4 -12 weeks. Testosterone levels should be AM, pre-dose levels.   -Recommend start Androgel 1.62% 2 pumps (40.5 mg) once daily (PharmD to discuss with PCP). Likely will need PA - pharmD can support this process.        Diabetes: Needs improvement - A1C at goal <8%. However patient was having lows. Has had some med changes and now fasting sugars are typically running above goal . Not checking post-prandial readings very often, would likely be beneficial for dosing insulin. Pt does have cost concerns so additional agents like SGLT2 or GLP1 not preferred. Given elevated BG readings, would suggest slow titration of insulin. Of note, pt is using higher than usually recommended dose of YOO. As pt is already on insulin, likely would be okay to reduce the doses to lower risk of hypoglycemia.   -Recommend pt test 2 hour post-prandial sugar once a day in addition to continued fasting checks.   -Recommend increasing Novolin NPH to 12 units two times a day   -Recommend decrease Glipizide 10 mg ER to  1 tab two times a day  -Above recommendations to be discussed with PCP       Follow Up  Return in about 2 weeks (around 6/23/2020) for Medication Management Pharmacist, Via Phone.  With PCP later today     Subjective & Objective                                                     Obi Messer is a 64 y.o. male coming in for an initial visit for Medication Therapy Management. He was referred to me from Enio Lechuga MD.     Patient consented to a telehealth visit: Yes      Chief Complaint: Medication Management - interested in testosterone therapy     Medication Adherence/Access: Having problems with test strips. Spoke with [facility] on Friday - does not have test strips on file.        Chronic Atrial Fibrillation/Hypertension/ CHF/ CAD: Prescribed Atorvastatin 40 mg daily, Digoxin 250 mcg daily, Metoprolol Tartrate 100 mg two times a day, Lisinopril 20 mg daily, Furosemide 40 mg daily, and warfarin 5 mg as instructed by [facility] anticoag team.     Per 3/4 Cardiology visit (Care Everywhere), Spironolactone was discontinued. On 3/12, continued to have low BP so Lisinopril was decreased to 20 mg daily.      2/22/18 Echo: EF improved to 50-55% from 25% in 2012     Call got disconnected before we could discuss BP readings. Unable to reach patient when tried calling back x 2.       BP Readings from Last 3 Encounters:   05/26/20 114/86   12/24/19 104/60   09/09/19 136/83     Pulse Readings from Last 3 Encounters:   05/26/20 80   12/24/19 75   09/09/19 78     Results for orders placed or performed in visit on 05/22/20   Comprehensive Metabolic Panel   Result Value Ref Range    Sodium 139 136 - 145 mmol/L    Potassium 3.5 3.5 - 5.0 mmol/L    Chloride 104 98 - 107 mmol/L    CO2 19 (L) 22 - 31 mmol/L    Anion Gap, Calculation 16 5 - 18 mmol/L    Glucose 57 (LL) 70 - 125 mg/dL    BUN 19 8 - 22 mg/dL    Creatinine 0.84 0.70 - 1.30 mg/dL    GFR MDRD Af Amer >60 >60 mL/min/1.73m2    GFR MDRD Non Af Amer >60 >60 mL/min/1.73m2     Bilirubin, Total 0.5 0.0 - 1.0 mg/dL    Calcium 9.1 8.5 - 10.5 mg/dL    Protein, Total 6.9 6.0 - 8.0 g/dL    Albumin 3.8 3.5 - 5.0 g/dL    Alkaline Phosphatase 97 45 - 120 U/L    AST 18 0 - 40 U/L    ALT 21 0 - 45 U/L        Fatigue/Low Testosterone: Symptoms mostly unchanged since last MTM visit, despite stopping spironolactone.       Persistent fatigue. Gets hot flashes and sweats. Loss of body hair, muscle mass. Libido is non-existent.  Sweating and hot flashes are the most bothersome.       Results for MARY MARTIN (MRN [id number]) as of 6/3/2020 15:34   Ref. Range 12/24/2019 11:55 5/22/2020 08:40   Free Testosterone Calc Latest Ref Range: 4.7 - 24.4 ng/dL 3.54 (L) 3.51 (L)   Patient Fasting > 8hrs? Unknown Unknown    Sex Hormone Bind Globulin Latest Ref Range: 11 - 80 nmol/L 44 49   TESTOSTERONE, FREE AND TOTAL (BTEST) Unknown Rpt (!) Rpt (!)   Testosterone, Total Latest Ref Range: 240 - 950 ng/dL 222 (L) 236 (L)         Lab Results   Component Value Date    WBC 13.2 (H) 12/24/2019    HGB 14.1 12/24/2019    HCT 43.2 12/24/2019    MCV 91 12/24/2019     12/24/2019     Lab Results   Component Value Date    CHOL 108 12/24/2019    CHOL 105 06/25/2019    CHOL 124 06/22/2016     Lab Results   Component Value Date    HDL 33 (L) 12/24/2019    HDL 38 (L) 06/25/2019    HDL 34 (L) 06/22/2016     Lab Results   Component Value Date    LDLCALC 51 12/24/2019    LDLCALC 45 06/25/2019    LDLCALC 60 06/22/2016     Lab Results   Component Value Date    TRIG 120 12/24/2019    TRIG 108 06/25/2019    TRIG 150 (H) 06/22/2016     No components found for: CHOLHDL        Diabetes:  Pt currently prescribed Glipizide 10 mg ER 2 tabs two times a day, Metformin 500 mg ER 2 tabs two times a day, and Novolin NPH 10 units two times a day.       patient is not currently experiencing side effects.        SMBG: twice daily    Ranges (patient reported) :     Fasting: ranges 95-165s but typically running closer to 150.     Prandial: Not  really testing after meals.       Patient is not experiencing hypoglycemia Hasn't had since medications were recently adjusted.    Recent symptoms of high blood sugar? Polyuria continues due to Furosemide. Still having polyuria, but doesn't feel this is due to diabetes. .   ACEi/ARB:  Lisinopril 40 mg daily   Statin: Atorvastatin 40 mg daily   Aspirin: Not taking    Diet: Eating more sugars.     Lab Results   Component Value Date    HGBA1C 6.7 (H) 2020    HGBA1C 9.6 (H) 2019    HGBA1C 8.8 (H) 2019     Lab Results   Component Value Date    LDLCALC 51 2019    CREATININE 0.84 2020             PMH: reviewed in EPIC   Allergies/ADRs: reviewed in EPIC   Alcohol:   Social History     Substance and Sexual Activity   Alcohol Use Yes    Comment: 2/week       Tobacco:   Social History     Tobacco Use   Smoking Status Former Smoker     Last attempt to quit: 2014     Years since quittin.3   Smokeless Tobacco Current User     Today's Vitals: There were no vitals filed for this visit.  ----------------    The patient declined an after visit summary    I spent 30 minutes with this patient today.   All changes were made via collaborative practice agreement with Enio Lechuga MD. A copy of the visit note was provided to the patient's provider.     Gildardo Sykes PharmD  Medication Therapy Management (MTM) Pharmacist  [facility]      Telemedicine Visit Details    Type of service:  Telephone     Start Time: 12:57 PM  End Time (time video/phone call stopped): 1:21 PM    Originating Location (pt. Location): Home    Distant Location (provider location):  [employer] MEDICATION THERAPY MANAGEMENT [facility]     Mode of Communication:   Telephone        Current Outpatient Medications   Medication Sig Dispense Refill     acetaminophen (TYLENOL) 500 MG tablet Take 1,000 mg by mouth every 6 (six) hours as needed for pain.       atorvastatin (LIPITOR) 40 MG tablet TAKE 1  "TABLET BY MOUTH DAILY AT BEDTIME 90 tablet 2     blood glucose test strips Use 1 each As Directed 2 (two) times a day. Dispense brand per patient's insurance at pharmacy discretion. 100 strip 3     digoxin (LANOXIN) 250 mcg (0.25 mg) tablet TAKE 1 TABLET(250 MCG) BY MOUTH DAILY 90 tablet 3     DULoxetine (CYMBALTA) 30 MG capsule Take 30 mg by mouth daily. ALONG WITH 60MG DOSE, TOTAL 90MG DAILY              DULoxetine (CYMBALTA) 60 MG capsule Take 1 capsule by mouth daily. ALONG WITH THE 30MG FOR A TOTAL DAILY DOSE OF 90MG  1     furosemide (LASIX) 40 MG tablet TAKE 1 TABLET BY MOUTH DAILY 90 tablet 2     glipiZIDE (GLUCOTROL XL) 10 MG 24 hr tablet TAKE 2 TABLETS BY MOUTH TWICE DAILY 30 MINUTES BEFORE EATING 360 tablet 0     HYDROcodone-acetaminophen 5-325 mg per tablet Take 1 tablet by mouth 3 (three) times a day. 30 tablet 0     insulin syringe-needle U-100 (BD INSULIN SYRINGE ULTRA-FINE) 1/2 mL 31 gauge x 15/64\" Syrg Use 1 each As Directed 2 (two) times a day. 100 Syringe 6     lisinopril (PRINIVIL,ZESTRIL) 40 MG tablet Take 20 mg by mouth daily.        metFORMIN (GLUCOPHAGE-XR) 500 MG 24 hr tablet TAKE 2 TABLETS BY MOUTH TWICE DAILY WITH FOOD 360 tablet 0     metoprolol tartrate (LOPRESSOR) 100 MG tablet Take 100 mg by mouth 2 (two) times a day.       NOVOLIN N NPH U-100 INSULIN 100 unit/mL injection INJECT 25 UNITS TWICE DAILY. INCREASE AS DIRECTED, UP  UNITS PER DAY. (Patient taking differently: 10 units two times a day) 20 mL 6     polyvinyl alcohol (LIQUIFILM TEARS) 1.4 % ophthalmic solution Administer 1 drop to both eyes as needed for dry eyes.       QUEtiapine (SEROQUEL) 100 MG tablet Take 200 mg by mouth at bedtime.        QUEtiapine (SEROQUEL) 25 MG tablet Take 25 mg by mouth 3 (three) times a day. Morning, afternoon and evening       tiZANidine (ZANAFLEX) 4 MG tablet TAKE 1 TABLET(4 MG) BY MOUTH TWICE DAILY AS NEEDED 30 tablet 1     topiramate (TOPAMAX) 25 MG tablet Take 25 mg by mouth 2 (two) " times a day.       warfarin ANTICOAGULANT (COUMADIN/JANTOVEN) 5 MG tablet TAKE 1 TABLET(5 MG) BY MOUTH DAILY AS DIRECTED. ADJUST DOSE BASED ON INR RESULTS 100 tablet 1     No current facility-administered medications for this visit.

## 2021-06-09 NOTE — TELEPHONE ENCOUNTER
Covering for Gildardo today. Called patient and informed him of the denied appeal. Patient wishes to speak with Gildardo next week when he returns, will route today.     Additionally offered patient a Good Rx coupon for ~$60, patient approved and sent coupon via [employer] today.   If using GoodRx coupon the most affordable pharmacy is [facility], will route to PCP for new rx to be sent to [facility].     Traci Hunter ([facility]), PharmD  Medication Therapy Management Pharmacist

## 2021-06-09 NOTE — TELEPHONE ENCOUNTER
RN cannot approve Refill Request    RN can NOT refill this medication Protocol failed and NO refill given. Last office visit: 12/24/2019 Enio Lechuga MD Last Physical: Visit date not found Last MTM visit: Visit date not found Last visit same specialty: 12/24/2019 Enio Lechuga MD.  Next visit within 3 mo: Visit date not found  Next physical within 3 mo: Visit date not found      Kamilah Dixon, [facility] Triage/Med Refill 7/24/2020    Requested Prescriptions   Pending Prescriptions Disp Refills     metFORMIN (GLUCOPHAGE-XR) 500 MG 24 hr tablet [Pharmacy Med Name: METFORMIN ER 500MG 24HR TABS] 360 tablet 0     Sig: TAKE 2 TABLETS BY MOUTH TWICE DAILY WITH FOOD       Metformin Refill Protocol Failed - 7/23/2020  6:10 AM        Failed - Visit with PCP or prescribing provider visit in last 6 months or next 3 months     Last office visit with prescriber/PCP: Visit date not found OR same dept: 12/24/2019 Enio Lechuga MD OR same specialty: 12/24/2019 Enio Lechuga MD Last physical: Visit date not found Last MTM visit: Visit date not found         Next appt within 3 mo: Visit date not found  Next physical within 3 mo: Visit date not found  Prescriber OR PCP: Enio Lechuga MD  Last diagnosis associated with med order: 1. Type 2 diabetes mellitus without complication, without long-term current use of insulin (H)  - metFORMIN (GLUCOPHAGE-XR) 500 MG 24 hr tablet [Pharmacy Med Name: METFORMIN ER 500MG 24HR TABS]; TAKE 2 TABLETS BY MOUTH TWICE DAILY WITH FOOD  Dispense: 360 tablet; Refill: 0     If protocol passes may refill for 12 months if within 3 months of last provider visit (or a total of 15 months).           Failed - Microalbumin in last year      No results found for: MICROALBUR               Passed - Blood pressure in last 12 months     BP Readings from Last 1 Encounters:   05/26/20 114/86             Passed - LFT or AST or ALT in last 12 months     Albumin   Date Value Ref Range  Status   05/22/2020 3.8 3.5 - 5.0 g/dL Final     Bilirubin, Total   Date Value Ref Range Status   05/22/2020 0.5 0.0 - 1.0 mg/dL Final     Bilirubin, Direct   Date Value Ref Range Status   12/24/2019 0.2 <=0.5 mg/dL Final     Alkaline Phosphatase   Date Value Ref Range Status   05/22/2020 97 45 - 120 U/L Final     AST   Date Value Ref Range Status   05/22/2020 18 0 - 40 U/L Final     ALT   Date Value Ref Range Status   05/22/2020 21 0 - 45 U/L Final     Protein, Total   Date Value Ref Range Status   05/22/2020 6.9 6.0 - 8.0 g/dL Final                Passed - GFR or Serum Creatinine in last 6 months     GFR MDRD Non Af Amer   Date Value Ref Range Status   05/22/2020 >60 >60 mL/min/1.73m2 Final     GFR MDRD Af Amer   Date Value Ref Range Status   05/22/2020 >60 >60 mL/min/1.73m2 Final             Passed - A1C in last 6 months     Hemoglobin A1c   Date Value Ref Range Status   05/22/2020 6.7 (H) 3.5 - 6.0 % Final

## 2021-06-09 NOTE — TELEPHONE ENCOUNTER
Called pt.     He was wondering if there were other forms of testosterone that might be covered. Discussed that based on the denial, it seems testosterone was denied due to the indication of late onset/age related hypogonadism vs formulary denial.     Pt is waiting to hear back on a potential job in the next couple of weeks. If he gets the job, may be able to purchase the Androgel with GoodRx coupons. Pt to call PharmD in the next 2-3 weeks to update.     Gildardo Sykes, KervinD  Medication Therapy Management (MTM) Pharmacist  [facility]

## 2021-06-09 NOTE — TELEPHONE ENCOUNTER
RN cannot approve Refill Request    RN can NOT refill this medication med is not covered by policy/route to provider     . Last office visit: 12/24/2019 Enio Lechuga MD Last Physical: Visit date not found Last MTM visit: Visit date not found Last visit same specialty: 12/24/2019 Enio Lechuga MD.  Next visit within 3 mo: Visit date not found  Next physical within 3 mo: Visit date not found      Blanca Meadows, [facility] Triage/Med Refill 7/16/2020    Requested Prescriptions   Pending Prescriptions Disp Refills     tiZANidine (ZANAFLEX) 4 MG tablet [Pharmacy Med Name: TIZANIDINE 4MG TABLETS] 30 tablet 1     Sig: TAKE 1 TABLET(4 MG) BY MOUTH TWICE DAILY AS NEEDED       There is no refill protocol information for this order

## 2021-06-10 NOTE — TELEPHONE ENCOUNTER
ANTICOAGULATION  MANAGEMENT PROGRAM    Obi Messer is overdue for INR check.     Spoke with Pedro Luis who declined to schedule INR at this time. If calling back please schedule as soon as possible.    - currently has not transportation.  He will call for INR appt soon.      Christelle Huerta RN

## 2021-06-11 NOTE — TELEPHONE ENCOUNTER
RN cannot approve Refill Request    RN can NOT refill this medication med is not covered by policy/route to provider. Last office visit: 12/24/2019 Enio Lechuga MD Last Physical: Visit date not found Last MTM visit: Visit date not found Last visit same specialty: 12/24/2019 Enio Lechuga MD.  Next visit within 3 mo: Visit date not found  Next physical within 3 mo: Visit date not found      Sheyla Keene, [facility] Triage/Med Refill 9/1/2020    Requested Prescriptions   Pending Prescriptions Disp Refills     tiZANidine (ZANAFLEX) 4 MG tablet [Pharmacy Med Name: TIZANIDINE 4MG TABLETS] 30 tablet 1     Sig: TAKE 1 TABLET(4 MG) BY MOUTH TWICE DAILY AS NEEDED       There is no refill protocol information for this order

## 2021-06-11 NOTE — TELEPHONE ENCOUNTER
RN cannot approve Refill Request    RN can NOT refill this medication Protocol failed and NO refill given. Last office visit: Visit date not found Last Physical: Visit date not found Last MTM visit: Visit date not found Last visit same specialty: 12/24/2019 Enio Lechuga MD.  Next visit within 3 mo: Visit date not found  Next physical within 3 mo: Visit date not found      Blanca Meadows, [facility] Connection Triage/Med Refill 9/3/2020    Requested Prescriptions   Pending Prescriptions Disp Refills     glipiZIDE (GLUCOTROL XL) 10 MG 24 hr tablet [Pharmacy Med Name: GLIPIZIDE ER 10MG TABLETS] 360 tablet 0     Sig: TAKE 2 TABLETS BY MOUTH TWICE DAILY 30 MINUTES BEFORE EATING       Oral Hypoglycemics Refill Protocol Failed - 9/1/2020 10:44 AM        Failed - Microalbumin in last year      No results found for: MICROALBUR               Passed - Visit with PCP or prescribing provider visit in last 6 months       Last office visit with prescriber/PCP: Visit date not found OR same dept: 12/24/2019 Enio Lechuga MD OR same specialty: 12/24/2019 Enio Lechuga MD Last physical: Visit date not found Last MTM visit: Visit date not found         Next appt within 3 mo: Visit date not found  Next physical within 3 mo: Visit date not found  Prescriber OR PCP: Kamaljit Elise MD  Last diagnosis associated with med order: 1. Type 2 diabetes mellitus without complication, with long-term current use of insulin (H)  - glipiZIDE (GLUCOTROL XL) 10 MG 24 hr tablet [Pharmacy Med Name: GLIPIZIDE ER 10MG TABLETS]; TAKE 2 TABLETS BY MOUTH TWICE DAILY 30 MINUTES BEFORE EATING  Dispense: 360 tablet; Refill: 0     If protocol passes may refill for 12 months if within 3 months of last provider visit (or a total of 15 months).           Passed - A1C in last 6 months     Hemoglobin A1c   Date Value Ref Range Status   05/22/2020 6.7 (H) 3.5 - 6.0 % Final               Passed - Blood pressure in last year     BP Readings from Last 1  Encounters:   05/26/20 114/86             Passed - Serum creatinine in last year     Creatinine   Date Value Ref Range Status   05/22/2020 0.84 0.70 - 1.30 mg/dL Final

## 2021-06-11 NOTE — PROGRESS NOTES
Office Visit - Follow Up   Obi Messer   65 y.o. male    Date of Visit: 9/9/2020    Chief Complaint   Patient presents with     Follow-up     INR Check        Assessment and Plan   1. Type 2 diabetes mellitus without complication, without long-term current use of insulin (H)  Improved control with adjustment of his diabetic meds. A1c is down to 6.7. Now takes Novolin N 12 units twice a day metformin 500 mg 2 tablets twice a day and glipizide 10 mg twice a day. Due for his diabetic eye exam. Tells me he will see his eye doctor before the year ends.   - Glycosylated Hemoglobin A1c  - Basic Metabolic Panel  - Microalbumin, Random Urine; Future    2. Essential hypertension  Controlled. Continue furosemide, lisinopril and metoprolol.   - HM2(CBC w/o Differential)    3. Mixed hyperlipidemia  Controlled. Last lipids were good. Continue atorvastatin,   - Lipid Cascade  - Thyroid Stimulating Hormone (TSH)  - Hepatic Profile    4. Chronic atrial fibrillation (H)  Rate controlled. Continue digoxin.  Due for digoxin level and INR.  - Digoxin (Lanoxin )  - INR    5. Chronic pain of right knee,s/p knee replacement, 2017  Still complains of recurring right knee pains despite his previous knee replacement. Takes hydrocodone with acetaminophen as needed which resolves his pains.   - HYDROcodone-acetaminophen 5-325 mg per tablet; Take 1 tablet by mouth 3 (three) times a day.  Dispense: 30 tablet; Refill: 0    6. Morbid obesity (H)  Morbidly but lose some 21 lbs now. Congratulated him for losing weight.     7. Recurrent major depressive disorder, in full remission (H)  Now in full remission. Continue duloxetine.     8. Need for immunization against influenza  Flu shot was given.   - Influenza,Quad,High Dose,PF 65 YR+      Follow up in 4 months.      History of Present Illness   This 65 y.o. old male is here for follow up. Has diabetes now better controlled with adjustments of his diabetic meds by our pharm D. Last A1c was down to   6.7. Has hypertension and hyperlipidemia controlled by his meds. Only complains of recurring right knee pains despite having right knee replacement. Has depression now in remission with duloxetine. Morbidly obese but successfully lost 21 lbs since his last visit. Overall, he is doing and feeling well.     Review of Systems   A 12 point comprehensive review of systems was negative except as noted..     Medications, Allergies and Problem List   Reviewed and updated             Chief Complaint   Follow-up and INR Check       Patient Profile   Social History     Social History Narrative     in May, 2018. 3 grown children, 2 sons and 1 daughter. 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 Nonsmoker. Socially drinks alcohol 2 times a month.         Past Medical History   Patient Active Problem List   Diagnosis     Rupture of right quadriceps muscle, subsequent encounter     Chronic combined systolic and diastolic congestive heart failure (H)     Coronary artery disease due to lipid rich plaque, s/p stent LAD and OM, 2013     Type 2 diabetes mellitus without complication, without long-term current use of insulin (H)     Essential hypertension     Mixed hyperlipidemia     Venous stasis dermatitis of right lower extremity     Recurrent major depressive disorder, in full remission (H)     Chronic atrial fibrillation (H)     Cough     PAD (peripheral artery disease) (H)     Morbid obesity (H)     Hypogonadism male       Past Surgical History  He has a past surgical history that includes Cholecystectomy; LEFT TOTAL KNEE ARTHROPLASTY  (2016); Tonsilectomy, adenoidectomy, bilateral myringotomy and tubes (AGE 11); CERVICAL 5 (2004); Replacement total knee (Right); and Quadriceps tendon repair (Right, 12/5/2017).       Medications and Allergies   Current Outpatient Medications   Medication Sig     acetaminophen (TYLENOL) 500 MG tablet Take 1,000 mg by mouth every 6 (six) hours as needed for pain.     atorvastatin (LIPITOR) 40 MG  "tablet TAKE 1 TABLET BY MOUTH DAILY AT BEDTIME     blood glucose test strips Use 1 each As Directed 2 (two) times a day. Dispense brand per patient's insurance at pharmacy discretion.     digoxin (LANOXIN) 250 mcg (0.25 mg) tablet TAKE 1 TABLET(250 MCG) BY MOUTH DAILY     DULoxetine (CYMBALTA) 60 MG capsule Take 2 capsules by mouth daily.      furosemide (LASIX) 40 MG tablet TAKE 1 TABLET BY MOUTH DAILY     glipiZIDE (GLUCOTROL XL) 10 MG 24 hr tablet TAKE 2 TABLETS BY MOUTH TWICE DAILY 30 MINUTES BEFORE EATING (Patient taking differently: Take 1 tablet in the AM, and 2 tablets in the evening)     HYDROcodone-acetaminophen 5-325 mg per tablet Take 1 tablet by mouth 3 (three) times a day.     insulin syringe-needle U-100 (BD INSULIN SYRINGE ULTRA-FINE) 1/2 mL 31 gauge x 15/64\" Syrg Use 1 each As Directed 2 (two) times a day.     lisinopril (PRINIVIL,ZESTRIL) 40 MG tablet Take 20 mg by mouth daily.      metFORMIN (GLUCOPHAGE-XR) 500 MG 24 hr tablet TAKE 2 TABLETS BY MOUTH TWICE DAILY WITH FOOD     metoprolol tartrate (LOPRESSOR) 100 MG tablet Take 100 mg by mouth 2 (two) times a day.     NOVOLIN N NPH U-100 INSULIN 100 unit/mL injection INJECT 25 UNITS TWICE DAILY. INCREASE AS DIRECTED, UP  UNITS PER DAY. (Patient taking differently: 12 units two times a day)     polyvinyl alcohol (LIQUIFILM TEARS) 1.4 % ophthalmic solution Administer 1 drop to both eyes as needed for dry eyes.     QUEtiapine (SEROQUEL) 100 MG tablet Take 300 mg by mouth at bedtime.      QUEtiapine (SEROQUEL) 25 MG tablet Take 25 mg by mouth 4 (four) times a day.      testosterone (ANDROGEL PUMP) 20.25 mg/1.25 gram (1.62 %) GlPm gel Apply 2 pumps topically every morning.     tiZANidine (ZANAFLEX) 4 MG tablet TAKE 1 TABLET(4 MG) BY MOUTH TWICE DAILY AS NEEDED     topiramate (TOPAMAX) 25 MG tablet Take 50 mg by mouth 2 (two) times a day.      warfarin ANTICOAGULANT (COUMADIN/JANTOVEN) 5 MG tablet TAKE 1 TABLET(5 MG) BY MOUTH DAILY AS DIRECTED. " ADJUST DOSE BASED ON INR RESULTS     No Known Allergies     Family and Social History   Family History   Problem Relation Age of Onset     Heart disease Father         Social History     Tobacco Use     Smoking status: Former Smoker     Last attempt to quit: 2014     Years since quittin.6     Smokeless tobacco: Current User   Substance Use Topics     Alcohol use: Yes     Comment: 2/week     Drug use: No        Physical Exam       Physical Exam  /62   Pulse 66   Wt (!) 272 lb (123.4 kg)   SpO2 97%   BMI 41.36 kg/m    General appearance: alert, appears stated age, cooperative, no distress and morbidly obese  Head: Normocephalic, without obvious abnormality, atraumatic  Eyes: conjunctivae/corneas clear. PERRL, EOM's intact. Fundi benign.  Throat: lips, mucosa, and tongue normal; teeth and gums normal  Neck: no adenopathy, no carotid bruit, no JVD, supple, symmetrical, trachea midline and thyroid not enlarged, symmetric, no tenderness/mass/nodules  Lungs: clear to auscultation bilaterally  Heart: regular rate and rhythm, S1, S2 normal, no murmur, click, rub or gallop  Abdomen: soft, non-tender; bowel sounds normal; no masses,  no organomegaly  Extremities: extremities normal, atraumatic, no cyanosis or edema  Skin: Skin color, texture, turgor normal. No rashes or lesions     Additional Information   Post Discharge Medication Reconciliation Status: discharge medications reconciled, continue medications without change      Enio Lechuga MD  Internal Medicine  Contact me at [phone]     Additional Information   Current Outpatient Medications   Medication Sig     acetaminophen (TYLENOL) 500 MG tablet Take 1,000 mg by mouth every 6 (six) hours as needed for pain.     atorvastatin (LIPITOR) 40 MG tablet TAKE 1 TABLET BY MOUTH DAILY AT BEDTIME     blood glucose test strips Use 1 each As Directed 2 (two) times a day. Dispense brand per patient's insurance at pharmacy discretion.     digoxin (LANOXIN)  "250 mcg (0.25 mg) tablet TAKE 1 TABLET(250 MCG) BY MOUTH DAILY     DULoxetine (CYMBALTA) 60 MG capsule Take 2 capsules by mouth daily.      furosemide (LASIX) 40 MG tablet TAKE 1 TABLET BY MOUTH DAILY     glipiZIDE (GLUCOTROL XL) 10 MG 24 hr tablet TAKE 2 TABLETS BY MOUTH TWICE DAILY 30 MINUTES BEFORE EATING (Patient taking differently: Take 1 tablet in the AM, and 2 tablets in the evening)     HYDROcodone-acetaminophen 5-325 mg per tablet Take 1 tablet by mouth 3 (three) times a day.     insulin syringe-needle U-100 (BD INSULIN SYRINGE ULTRA-FINE) 1/2 mL 31 gauge x 15/64\" Syrg Use 1 each As Directed 2 (two) times a day.     lisinopril (PRINIVIL,ZESTRIL) 40 MG tablet Take 20 mg by mouth daily.      metFORMIN (GLUCOPHAGE-XR) 500 MG 24 hr tablet TAKE 2 TABLETS BY MOUTH TWICE DAILY WITH FOOD     metoprolol tartrate (LOPRESSOR) 100 MG tablet Take 100 mg by mouth 2 (two) times a day.     NOVOLIN N NPH U-100 INSULIN 100 unit/mL injection INJECT 25 UNITS TWICE DAILY. INCREASE AS DIRECTED, UP  UNITS PER DAY. (Patient taking differently: 12 units two times a day)     polyvinyl alcohol (LIQUIFILM TEARS) 1.4 % ophthalmic solution Administer 1 drop to both eyes as needed for dry eyes.     QUEtiapine (SEROQUEL) 100 MG tablet Take 300 mg by mouth at bedtime.      QUEtiapine (SEROQUEL) 25 MG tablet Take 25 mg by mouth 4 (four) times a day.      testosterone (ANDROGEL PUMP) 20.25 mg/1.25 gram (1.62 %) GlPm gel Apply 2 pumps topically every morning.     tiZANidine (ZANAFLEX) 4 MG tablet TAKE 1 TABLET(4 MG) BY MOUTH TWICE DAILY AS NEEDED     topiramate (TOPAMAX) 25 MG tablet Take 50 mg by mouth 2 (two) times a day.      warfarin ANTICOAGULANT (COUMADIN/JANTOVEN) 5 MG tablet TAKE 1 TABLET(5 MG) BY MOUTH DAILY AS DIRECTED. ADJUST DOSE BASED ON INR RESULTS     No Known Allergies  Social History     Tobacco Use     Smoking status: Former Smoker     Last attempt to quit: 2014     Years since quittin.6     Smokeless tobacco: " Current User   Substance Use Topics     Alcohol use: Yes     Comment: 2/week     Drug use: No         Time: total time spent with the patient was 25 minutes of which >50% was spent in counseling and coordination of care

## 2021-06-11 NOTE — TELEPHONE ENCOUNTER
ANTICOAGULATION  MANAGEMENT    Assessment     Today's INR result of 2.60 is Therapeutic (goal INR of 2.0-3.0)        Warfarin taken as previously instructed    No new diet changes affecting INR    Potential interaction between Duloxetine and warfarin which may affect subsequent INRs    - today, Duloxetine dose increased from 90mg to 120mg daily.   - per [employer], Concurrent use of DULOXETINE and WARFARIN may result in altered anticoagulation effects including increased risk of bleeding.     Continues to tolerate warfarin with no reported s/s of bleeding or thromboembolism     Previous INR was Therapeutic at 2.50 on 6/17/20.    Successful WT loss of 21 lbs.    Plan:     Spoke on phone with  regarding INR result and instructed:    1.  Advised Pedro Luis, if any s/sx of any bleeding to come in sooner for INR.    Warfarin Dosing Instructions:   (evenings. Has 5mg tabs)   - Continue current warfarin dose 7.5 mg daily on Thursdays; and 5 mg daily rest of week.    Instructed patient to follow up no later than:  2-4 wks.   - advised 2 wks to recheck INR,  However, he would prefer to return in 4 wks.    Education provided: target INR goal and significance of current INR result, potential interaction between warfarin and Duloxetine, importance of notifying clinic for changes in medications, monitoring for bleeding signs and symptoms and when to seek medical attention/emergency care    Pedro Luis verbalizes understanding and agrees to warfarin dosing plan.    Instructed to call the [facility] for any changes, questions or concerns. (#[phone])   ?   Christelle Huerta RN    Subjective/Objective:      Obi Messer, a 65 y.o. male is on warfarin.     Obi reports:     Home warfarin dose: as updated on anticoagulation calendar per template     Missed doses: No     Medication changes:  Yes:  Increased Duloxetine dose.     S/S of bleeding or thromboembolism:  No     New Injury or illness:  No     Changes in diet or alcohol  consumption:  No     Upcoming surgery, procedure or cardioversion:  No    Anticoagulation Episode Summary     Current INR goal:   2.0-3.0   TTR:   89.1 % (8.3 mo)   Next INR check:   10/7/2020   INR from last check:   2.60 (9/9/2020)   Weekly max warfarin dose:      Target end date:      INR check location:      Preferred lab:      Send INR reminders to:   [facility]    Indications    Chronic atrial fibrillation (H) [I48.20]           Comments:            Anticoagulation Care Providers     Provider Role Specialty Phone number    Enio Lechuga MD Referring Internal Medicine [phone]

## 2021-06-12 NOTE — TELEPHONE ENCOUNTER
ANTICOAGULATION  MANAGEMENT PROGRAM    Obi Messer is overdue for INR check.     Spoke with Pedro Luis and scheduled INR appointment on 11/3/20 @ [facility].      Christelle Huerta RN

## 2021-06-12 NOTE — TELEPHONE ENCOUNTER
ANTICOAGULATION  MANAGEMENT PROGRAM    bOi Messer is overdue for INR check.     Left message to call and schedule INR appointment as soon as possible.      Christelle Huerta RN

## 2021-06-12 NOTE — TELEPHONE ENCOUNTER
Controlled Substance Refill Request  Medication Name:   Requested Prescriptions     Pending Prescriptions Disp Refills     HYDROcodone-acetaminophen 5-325 mg per tablet 30 tablet 0     Sig: Take 1 tablet by mouth 3 (three) times a day.     Date Last Fill: 9/9/20  Requested Pharmacy: [facility]  Submit electronically to pharmacy  Controlled Substance Agreement on file:   Encounter-Level CSA Scan Date:    There are no encounter-level csa scan date.        Last office visit:  9/9/20

## 2021-06-12 NOTE — TELEPHONE ENCOUNTER
RN cannot approve Refill Request    RN can NOT refill this medication med is not covered by policy/route to provider. Last office visit: 9/9/2020 Enio Lechuga MD Last Physical: Visit date not found Last MTM visit: Visit date not found Last visit same specialty: 9/9/2020 Enio Lechuga MD.  Next visit within 3 mo: Visit date not found  Next physical within 3 mo: Visit date not found      Sheyla Keene, [facility] Triage/Med Refill 10/27/2020    Requested Prescriptions   Pending Prescriptions Disp Refills     tiZANidine (ZANAFLEX) 4 MG tablet [Pharmacy Med Name: TIZANIDINE 4MG TABLETS] 30 tablet 1     Sig: TAKE 1 TABLET(4 MG) BY MOUTH TWICE DAILY AS NEEDED       There is no refill protocol information for this order

## 2021-06-12 NOTE — TELEPHONE ENCOUNTER
RN cannot approve Refill Request    RN can NOT refill this medication PCP messaged that patient is overdue for Labs. Last office visit: 9/9/2020 Enio Lechuga MD Last Physical: Visit date not found Last MTM visit: Visit date not found Last visit same specialty: 9/9/2020 Enio Lechuga MD.  Next visit within 3 mo: Visit date not found  Next physical within 3 mo: Visit date not found      Kacie Mayes, [facility] Triage/Med Refill 10/24/2020    Requested Prescriptions   Pending Prescriptions Disp Refills     metFORMIN (GLUCOPHAGE-XR) 500 MG 24 hr tablet [Pharmacy Med Name: METFORMIN ER 500MG 24HR TABS] 360 tablet 0     Sig: TAKE 2 TABLETS BY MOUTH TWICE DAILY WITH FOOD       Metformin Refill Protocol Failed - 10/24/2020  6:05 AM        Failed - Microalbumin in last year      No results found for: MICROALBUR               Passed - Blood pressure in last 12 months     BP Readings from Last 1 Encounters:   09/09/20 108/62             Passed - LFT or AST or ALT in last 12 months     Albumin   Date Value Ref Range Status   09/09/2020 3.6 3.5 - 5.0 g/dL Final     Bilirubin, Total   Date Value Ref Range Status   09/09/2020 0.3 0.0 - 1.0 mg/dL Final     Bilirubin, Direct   Date Value Ref Range Status   09/09/2020 0.2 <=0.5 mg/dL Final     Alkaline Phosphatase   Date Value Ref Range Status   09/09/2020 111 45 - 120 U/L Final     AST   Date Value Ref Range Status   09/09/2020 22 0 - 40 U/L Final     ALT   Date Value Ref Range Status   09/09/2020 33 0 - 45 U/L Final     Protein, Total   Date Value Ref Range Status   09/09/2020 7.5 6.0 - 8.0 g/dL Final                Passed - GFR or Serum Creatinine in last 6 months     GFR MDRD Non Af Amer   Date Value Ref Range Status   09/09/2020 >60 >60 mL/min/1.73m2 Final     GFR MDRD Af Amer   Date Value Ref Range Status   09/09/2020 >60 >60 mL/min/1.73m2 Final             Passed - Visit with PCP or prescribing provider visit in last 6 months or next 3 months     Last  office visit with prescriber/PCP: 9/9/2020 OR same dept: 9/9/2020 Enio Lechuga MD OR same specialty: 9/9/2020 Enio Lechuga MD Last physical: Visit date not found Last MTM visit: Visit date not found         Next appt within 3 mo: Visit date not found  Next physical within 3 mo: Visit date not found  Prescriber OR PCP: Enio Lechuga MD  Last diagnosis associated with med order: 1. Type 2 diabetes mellitus without complication, without long-term current use of insulin (H)  - metFORMIN (GLUCOPHAGE-XR) 500 MG 24 hr tablet [Pharmacy Med Name: METFORMIN ER 500MG 24HR TABS]; TAKE 2 TABLETS BY MOUTH TWICE DAILY WITH FOOD  Dispense: 360 tablet; Refill: 0    2. Mixed hyperlipidemia  - atorvastatin (LIPITOR) 40 MG tablet [Pharmacy Med Name: ATORVASTATIN 40MG TABLETS]; TAKE 1 TABLET BY MOUTH DAILY AT BEDTIME  Dispense: 90 tablet; Refill: 2     If protocol passes may refill for 12 months if within 3 months of last provider visit (or a total of 15 months).           Passed - A1C in last 6 months     Hemoglobin A1c   Date Value Ref Range Status   09/09/2020 6.7 (H) <=5.6 % Final     Comment:     Normal <5.7% Prediabete 5.7-6.4% Diabletes 6.5% or higher - adopted from ADA consensus guidelines                  atorvastatin (LIPITOR) 40 MG tablet [Pharmacy Med Name: ATORVASTATIN 40MG TABLETS] 90 tablet 2     Sig: TAKE 1 TABLET BY MOUTH DAILY AT BEDTIME       Statins Refill Protocol (Hmg CoA Reductase Inhibitors) Passed - 10/24/2020  6:05 AM        Passed - PCP or prescribing provider visit in past 12 months      Last office visit with prescriber/PCP: 9/9/2020 Enio Lechuga MD OR same dept: 9/9/2020 Enio Lechuga MD OR same specialty: 9/9/2020 Enio Lechuga MD  Last physical: Visit date not found Last MTM visit: Visit date not found   Next visit within 3 mo: Visit date not found  Next physical within 3 mo: Visit date not found  Prescriber OR PCP: Enio Lechuga MD  Last diagnosis associated  with med order: 1. Type 2 diabetes mellitus without complication, without long-term current use of insulin (H)  - metFORMIN (GLUCOPHAGE-XR) 500 MG 24 hr tablet [Pharmacy Med Name: METFORMIN ER 500MG 24HR TABS]; TAKE 2 TABLETS BY MOUTH TWICE DAILY WITH FOOD  Dispense: 360 tablet; Refill: 0    2. Mixed hyperlipidemia  - atorvastatin (LIPITOR) 40 MG tablet [Pharmacy Med Name: ATORVASTATIN 40MG TABLETS]; TAKE 1 TABLET BY MOUTH DAILY AT BEDTIME  Dispense: 90 tablet; Refill: 2    If protocol passes may refill for 12 months if within 3 months of last provider visit (or a total of 15 months).

## 2021-06-12 NOTE — ANESTHESIA PREPROCEDURE EVALUATION
Anesthesia Evaluation      Patient summary reviewed   No history of anesthetic complications     Airway   Mallampati: II  Neck ROM: full   Pulmonary - normal exam    breath sounds clear to auscultation  (+) shortness of breath, a smoker  (-) sleep apnea                         Cardiovascular   Exercise tolerance: < 4 METS (Limited by knee pain)  (+) hypertension, CAD, dysrhythmias, CHF, , hypercholesterolemia,     (-) angina, murmur  ECG reviewed (afib)  Rhythm: irregular  Rate: normal,    no murmur      Neuro/Psych    (+) depression, anxiety/panic attacks,     Comments: ETOH abuse  Bipolar  Cervical fusion    Endo/Other    (+) diabetes mellitus type 2, arthritis, obesity,      GI/Hepatic/Renal    (+) esophageal disease,  chronic renal disease,   (-) GERD     Other findings: Partial plate      Dental    (+) caps                       Anesthesia Plan  Planned anesthetic: general endotracheal  Case canceled in pre-op as pt not off Xarelto X 3 days.  ASA 3   Induction: intravenous   Anesthetic plan and risks discussed with: patient  Anesthesia plan special considerations: antiemetics,

## 2021-06-14 NOTE — TELEPHONE ENCOUNTER
ANTICOAGULATION  MANAGEMENT PROGRAM    Obi Mesesr is overdue for INR check.     Left message to check INR at upcoming office visit on 1/13      Sofi Polanco RN

## 2021-06-14 NOTE — ANESTHESIA PROCEDURE NOTES
Spinal Block    Patient location during procedure: OR  Start time: 12/5/2017 8:24 AM  End time: 12/5/2017 8:30 AM  Reason for block: primary anesthetic    Staffing:  Performing  Anesthesiologist: HENRIK CARLSON    Preanesthetic Checklist  Completed: patient identified, risks, benefits, and alternatives discussed, timeout performed, consent obtained, airway assessed, oxygen available, suction available, emergency drugs available and hand hygiene performed  Spinal Block  Patient position: sitting  Prep: ChloraPrep  Patient monitoring: heart rate, cardiac monitor, continuous pulse ox and blood pressure  Approach: midline  Location: L3-4  Injection technique: single-shot  Needle type: pencil-tip   Needle gauge: 24 G

## 2021-06-14 NOTE — ANESTHESIA PROCEDURE NOTES
Peripheral Block    Patient location during procedure: pre-op  Start time: 12/5/2017 6:58 AM  End time: 12/5/2017 7:04 AM  post-op analgesia per surgeon order as noted in medical record  Staffing:  Performing  Anesthesiologist: ELI VARNER  Preanesthetic Checklist  Completed: patient identified, site marked, risks, benefits, and alternatives discussed, timeout performed, consent obtained, airway assessed, oxygen available, suction available, emergency drugs available and hand hygiene performed  Peripheral Block  Block type: femoral  Prep: ChloraPrep  Patient position: supine  Patient monitoring: cardiac monitor, continuous pulse oximetry, heart rate and blood pressure  Laterality: right  Injection technique: ultrasound guided    Ultrasound used to visualize needle placement in proximity to nerve being blocked: yes   Permanent ultrasound image captured for medical record    Needle  Needle type: echogenic   Needle gauge: 20G  Needle length: 4 in  no peripheral nerve catheter placed  Assessment  Injection assessment: no difficulty with injection, negative aspiration for heme, no paresthesia on injection, incremental injection and transient paresthesias  Additional Notes      Right femoral nerve block with bupivacaine 0.5% 20 cc.    Eli Varner MD  Staff Anesthesiologist  [facility]

## 2021-06-14 NOTE — ANESTHESIA CARE TRANSFER NOTE
Last vitals:   Vitals:    12/05/17 1002   BP: 129/66   Pulse: (!) 120   Resp: 20   Temp: 37  C (98.6  F)   SpO2: 99%     Patient's level of consciousness is awake and drowsy  Spontaneous respirations: yes  Maintains airway independently: yes  Dentition unchanged: yes  Oropharynx: oropharynx clear of all foreign objects    QCDR Measures:  ASA# 20 - Surgical Safety Checklist: WHO surgical safety checklist completed prior to induction  PQRS# 430 - Adult PONV Prevention: 4558F - Pt received => 2 anti-emetic agents (different classes) preop & intraop  ASA# 8 - Peds PONV Prevention: NA - Not pediatric patient, not GA or 2 or more risk factors NOT present  PQRS# 424 - Hallie-op Temp Management: 4559F - At least one body temp DOCUMENTED => 35.5C or 95.9F within required timeframe  PQRS# 426 - PACU Transfer Protocol: - Transfer of care checklist used  ASA# 14 - Acute Post-op Pain: ASA14B - Patient did NOT experience pain >= 7 out of 10

## 2021-06-14 NOTE — ANESTHESIA PREPROCEDURE EVALUATION
Anesthesia Evaluation      Patient summary reviewed     Airway   Mallampati: III  Neck ROM: full   Pulmonary     breath sounds clear to auscultation  (+) a smoker (Former)                         Cardiovascular   Exercise tolerance: > or = 4 METS (Activity currently limited by knee pain)  (+) hypertension, CAD, CABG/stent (Stents), dysrhythmias (a.fib, a.flutter), CHF, , hypercholesterolemia,     Rhythm: irregular     ROS comment: Stopped Xarelto 7 days ago.     Neuro/Psych    (+) neuromuscular disease,  depression, anxiety/panic attacks,     Comments: Bipolar  Cervical fusion      Endo/Other    (+) diabetes mellitus type 2, obesity,      GI/Hepatic/Renal    (+)   chronic renal disease,      Other findings: Right quadriceps rupture    EtOH abuse      Dental    (+) poor dentition, lower dentures and upper dentures    Comment: Partials - out                       Anesthesia Plan  Planned anesthetic: spinal and peripheral nerve block      SAB with FN block for post-op pain as requested by surgeon.    ASA 3     Anesthetic plan and risks discussed with: patient  Anesthesia plan special considerations: antiemetics,   Post-op plan: routine recovery        Eli Varner MD  Staff Anesthesiologist  [facility]  12/5/17

## 2021-06-14 NOTE — TELEPHONE ENCOUNTER
FYI - Status Update  Who is Calling: Patient  Update: Stated that he has changed insurance, and is also changing providers. Will be doing INR's with a provider through [facility]. If anything changes he can call back again.  Okay to leave a detailed message?:  No return call needed

## 2021-06-15 NOTE — TELEPHONE ENCOUNTER
Pedro Luis,      - has appt with [facility] Internal medicine on 2/25/21.     - they have been refilling his medications.     - will resolve / discharge from [facility] program after visit on 2/25/21.

## 2021-06-15 NOTE — TELEPHONE ENCOUNTER
Refill Approved    Rx renewed per Medication Renewal Policy. Medication was last renewed on 5/26/20.    Rene Lozoya, [facility] Connection Triage/Med Refill 2/21/2021     Requested Prescriptions   Pending Prescriptions Disp Refills     furosemide (LASIX) 40 MG tablet [Pharmacy Med Name: FUROSEMIDE 40MG TABLETS] 90 tablet 2     Sig: TAKE 1 TABLET BY MOUTH DAILY       Diuretics/Combination Diuretics Refill Protocol  Passed - 2/21/2021  6:10 AM        Passed - Visit with PCP or prescribing provider visit in past 12 months     Last office visit with prescriber/PCP: 9/9/2020 Enio Lechuga MD OR same dept: 9/9/2020 Enio Lechuga MD OR same specialty: 9/9/2020 Enio Lechuga MD  Last physical: Visit date not found Last MTM visit: Visit date not found   Next visit within 3 mo: Visit date not found  Next physical within 3 mo: Visit date not found  Prescriber OR PCP: Enio Lechuga MD  Last diagnosis associated with med order: 1. Edema, unspecified type  - furosemide (LASIX) 40 MG tablet [Pharmacy Med Name: FUROSEMIDE 40MG TABLETS]; TAKE 1 TABLET BY MOUTH DAILY  Dispense: 90 tablet; Refill: 2    If protocol passes may refill for 12 months if within 3 months of last provider visit (or a total of 15 months).             Passed - Serum Potassium in past 12 months      Lab Results   Component Value Date    Potassium 3.4 (L) 09/09/2020             Passed - Serum Sodium in past 12 months      Lab Results   Component Value Date    Sodium 138 09/09/2020             Passed - Blood pressure on file in past 12 months     BP Readings from Last 1 Encounters:   09/09/20 108/62             Passed - Serum Creatinine in past 12 months      Creatinine   Date Value Ref Range Status   09/09/2020 0.78 0.70 - 1.30 mg/dL Final

## 2021-06-17 NOTE — TELEPHONE ENCOUNTER
Telephone Encounter by Gabi King at 6/18/2020  9:25 AM     Author: Gabi King Service: -- Author Type: --    Filed: 6/18/2020  9:28 AM Encounter Date: 6/9/2020 Status: Signed    : Gabi King       APPEAL DENIAL    DENIAL RATIONALE:               2ND LEVEL APPEAL INFORMATION:Second level appeals are not completed by the PA dept.  They are managed by the clinic staff.

## 2021-06-17 NOTE — TELEPHONE ENCOUNTER
"Telephone Encounter by Christelle Huerta RN at 12/17/2020  2:48 PM     Author: Christelle Huerta RN Service: -- Author Type: Registered Nurse    Filed: 12/17/2020  2:53 PM Encounter Date: 12/17/2020 Status: Attested    : Christelle Huerta RN (Registered Nurse) Cosigner: Enio Lechuga MD at 12/17/2020  3:01 PM    Attestation signed by Enio Lechuga MD at 12/17/2020  3:01 PM    Ok to renew anticoagulation.                 Anticoagulation Annual Referral Renewal Review    Obi ROSAS Shaziaary's chart reviewed for annual renewal of referral to anticoagulation monitoring.        Criteria for anticoagulation nurse and/or pharmacist renewal met   Warfarin indication: Atrial Fibrillation Yes, per indication   Current with INR monitoring/compliant No  Non-compliance No   Date of last office visit 09/09/2020 Yes, had office visit within last year   Time in Therapeutic Range (TTR) 89.1 % Yes, TTR > 60%       Obi Messer did NOT meet all criteria for anticoagulation management program initiated renewal and requires provider review. Using dot phrase, \".acmrenewalprovider\", please advise if Obi's anticoagulation management referral should be renewed or if patient should be seen in office to review anticoagulation therapy      Christelle Huerta RN  2:48 PM           "

## 2021-06-17 NOTE — PATIENT INSTRUCTIONS - HE
Patient Instructions by Ashly Nath RN at 8/12/2019  2:40 PM     Author: Ashly aNth RN Service: -- Author Type: Registered Nurse    Filed: 8/12/2019  3:09 PM Encounter Date: 8/12/2019 Status: Signed    : Ashly Nath RN (Registered Nurse)       Venous Insufficiency Ultrasound    Description  A venous insufficiency ultrasound is a relatively pain free exam. The vascular laboratory will contain a bed and just two or three pieces of equipment. You will be asked to remove pants or shorts and gowns will be provided. It usually takes about 30-60 minutes.  The technician will tuck a towel under your underpants in the groin. The gel is water-soluble and will not stain your skin or clothes.  Ultrasound gel, usually warmed for your comfort, will be placed on the inner side of your legs.    Through the gel, the technician will apply to your legs a small hand-held device that emits sound waves. The technician will be applying pressure to your legs at certain levels.   When the test is completed, the technician will remove excess gel from your legs.    Risks  There are typically no side effects or complications associated with a lower extremity venous insufficiency duplex ultrasound.  How to Prepare  Eat and take medications as usual.  There is no preparation required for a lower extremity venous insufficiency duplex ultrasound.  What Can I Expect After the Test?  The technician will send the ultrasound images to your vascular surgeon for evaluation. Typically, a report is available in 2-3 days. If anything critical is found, it is standard practice to notify the vascular surgeon immediately.  Reference: https://vascular.org/patient-resources/vascular-conditions/chronic-venous-insufficiency    Ankle-Brachial Index (DEBBIE) or Physiologic Test    Description  An ankle-brachial index test is relatively pain free. Blood pressure cuffs of various sizes are placed on your thigh, calf, foot and toes.  Similar to  having your blood pressure checked with an arm cuff, as the technician inflates the cuffs, they progressively tighten and are then quickly released.  You may feel some discomfort, but generally for less than 60 seconds for each measurement. You will be asked to remove your socks and shoes and possibly your pants or shorts. Gowns will be provided. It usually takes about 30-60 minutes.   Depending on the initial readings and patient symptoms, you may be asked to perform a light walk on a treadmill.  The technician will apply ultrasound gel, usually warmed for your comfort, to your ankles and wrists. Through the gel, the technician will use a small hand-held device that emits sound waves.  Risks  There are typically no side effects or complications associated with a physiologic study.  How to Prepare  Eat and take medications as usual.  There is no preparation required for an ankle-brachial index (DEBBIE) or physiologic exam.  What Can I Expect After the Test?  The technician will send the ultrasound images to your vascular surgeon for evaluation. Typically, a report is available in 2-3 days. If anything critical is found, it is standard practice to notify the vascular surgeon immediately.  Reference: https://vascular.org/patient-resources/vascular-tests

## 2021-06-17 NOTE — TELEPHONE ENCOUNTER
Telephone Encounter by Betty Norman RN at 4/3/2020 10:07 AM     Author: Betty Norman RN Service: -- Author Type: Registered Nurse    Filed: 4/3/2020 10:33 AM Encounter Date: 4/3/2020 Status: Attested    : Betty Norman RN (Registered Nurse) Cosigner: Enio Lechuga MD at 4/3/2020 10:37 AM    Attestation signed by Enio Lechuga MD at 4/3/2020 10:37 AM    ok                ANTICOAGULATION  MANAGEMENT    Assessment     Yesterday's INR result of 3.0 is Therapeutic (goal INR of 2.0-3.0)        Warfarin recently held as instructed which may be affecting INR. Held 4/1 and 4/2 as directed by urgent care per patient    No new diet changes affecting INR    Potential interaction between cefdinir (starting today x 10 days) and warfarin which may affect subsequent INRs    Per patient he was coughing up blood earlier this week. Was seen in urgent care for symptom. Lower respiratory infection and possible Covid    Previous INR was Therapeutic    Plan:     Spoke with Obi regarding INR result and instructed:     Warfarin Dosing Instructions:  Continue same dose of 5mg daily except take lower dose of 2.5mg on Monday 4/6  Discussed dosing with pharmacist Traci Landeros    Instructed patient to follow up no later than: one week    Education provided: importance of therapeutic range, target INR goal and significance of current INR result, potential interaction between warfarin and Cefdinir, monitoring for bleeding signs and symptoms and when to seek medical attention/emergency care    Pedro Luis verbalizes understanding and agrees to warfarin dosing plan.    Instructed to call the [facility] for any changes, questions or concerns. (#[phone])   ?   Betty Norman RN    Subjective/Objective:      Obi Messer, a 64 y.o. male is on warfarin.     Obi reports:     Home warfarin dose: verbally confirmed home dose with Pedro Luis and updated on anticoagulation calendar     Missed doses: held two  days as instructed     Medication changes:  Yes: Cefdinir x 10 days     S/S of bleeding or thromboembolism:  Yes: coughing up blood earlier this week, has not had this issue in the last couple of days     New Injury or illness:  Yes: possible covid and lower respiratory inection     Changes in diet or alcohol consumption:  No     Upcoming surgery, procedure or cardioversion:  No    Anticoagulation Episode Summary     Current INR goal:   2.0-3.0   TTR:   99.5 % (3 mo)   Next INR check:   4/9/2020   INR from last check:   3.00 (4/1/2020)   Weekly max warfarin dose:      Target end date:      INR check location:      Preferred lab:      Send INR reminders to:   [facility]    Indications    Chronic atrial fibrillation [I48.20]           Comments:            Anticoagulation Care Providers     Provider Role Specialty Phone number    Enio Lechuga MD Referring Internal Medicine [phone]

## 2021-06-17 NOTE — TELEPHONE ENCOUNTER
Telephone Encounter by Gabi King at 6/10/2020  2:39 PM     Author: aGbi King Service: -- Author Type: --    Filed: 6/10/2020  2:42 PM Encounter Date: 6/9/2020 Status: Signed    : Gabi King       PRIOR AUTHORIZATION DENIED- Provider can call [phone] for additional information about denial    Denial Rational: In order for [employer] to cover medication patient MUST have one of the following diagnosis:   Primary hypogonadism/ testicular failure: cryptorchidism   Primary hypogonadism/ testicular failure: bilateral torsion   Primary hypogonadism/ testicular failure: orchitis   Primary hypogonadism/ testicular failure: vanishing testis syndrome   Primary hypogonadism/ testicular failure: orchiectomy   Primary hypogonadism/ testicular failure: Klinefelter's syndrome   Primary hypogonadism/ testicular failure: chemotherapy   Primary hypogonadism/testicular failure: toxic damage from alcohol or heavy metals   Hypogonadotropic hypogonadism (Also known as secondary or central hypogonadism): idiopathic gonadotropin or luteinizing hormone-releasing hormone (LHRH) deficiency   Hypogonadotropic hypogonadism (Also known as secondary or central hypogonadism): pituitary-hypothalamic injury from tumors, trauma, or radiation        Appeal Information: Provider can call [phone] for additional information about denial or provide a letter of medical necessity and route it to the PA team.

## 2021-06-17 NOTE — TELEPHONE ENCOUNTER
Telephone Encounter by Christelle Huerta RN at 2/26/2021  9:22 AM     Author: Christelle Huerta RN Service: -- Author Type: Registered Nurse    Filed: 2/26/2021  9:24 AM Encounter Date: 2/26/2021 Status: Attested    : Christelle Huerta RN (Registered Nurse) Cosigner: Enio Lechuga MD at 2/26/2021  9:28 AM    Attestation signed by Enio Lechuga MD at 2/26/2021  9:28 AM                    ANTICOAGULATION  MANAGEMENT PROGRAM    Obi Messer is being discharged from the [facility] Anticoagulation Management Program ([facility]).    Reason for discharge: care has been transferred to [facility] referral closed, anticoagulation episode resolved and INR standing order discontinued.     If Obi needs warfarin management in the future, please send a new referral.    Christelle Huerta RN

## 2021-06-19 NOTE — LETTER
Letter by Enio Lechuga MD at      Author: Enio Lechuga MD Service: -- Author Type: --    Filed:  Encounter Date: 6/26/2019 Status: (Other)         Obi Messer  [address]  [place] [address]             June 26, 2019         Dear Mr. Messer,    Below are the results from your recent visit:    Resulted Orders   Lipid Cascade   Result Value Ref Range    Cholesterol 105 <=199 mg/dL    Triglycerides 108 <=149 mg/dL    HDL Cholesterol 38 (L) >=40 mg/dL    LDL Calculated 45 <=129 mg/dL    Patient Fasting > 8hrs? Yes    Glycosylated Hemoglobin A1c   Result Value Ref Range    Hemoglobin A1c 8.8 (H) 3.5 - 6.0 %   HM2(CBC w/o Differential)   Result Value Ref Range    WBC 15.4 (H) 4.0 - 11.0 thou/uL    RBC 4.59 4.40 - 6.20 mill/uL    Hemoglobin 14.4 14.0 - 18.0 g/dL    Hematocrit 41.6 40.0 - 54.0 %    MCV 91 80 - 100 fL    MCH 31.3 27.0 - 34.0 pg    MCHC 34.6 32.0 - 36.0 g/dL    RDW 12.4 11.0 - 14.5 %    Platelets 244 140 - 440 thou/uL    MPV 6.8 (L) 7.0 - 10.0 fL   Basic Metabolic Panel   Result Value Ref Range    Sodium 132 (L) 136 - 145 mmol/L    Potassium 4.4 3.5 - 5.0 mmol/L    Chloride 95 (L) 98 - 107 mmol/L    CO2 24 22 - 31 mmol/L    Anion Gap, Calculation 13 5 - 18 mmol/L    Glucose 203 (H) 70 - 125 mg/dL    Calcium 9.4 8.5 - 10.5 mg/dL    BUN 25 (H) 8 - 22 mg/dL    Creatinine 1.51 (H) 0.70 - 1.30 mg/dL    GFR MDRD Af Amer 56 (L) >60 mL/min/1.73m2    GFR MDRD Non Af Amer 47 (L) >60 mL/min/1.73m2    Narrative    Fasting Glucose reference range is 70-99 mg/dL per  American Diabetes Association (ADA) guidelines.   Hepatic Profile   Result Value Ref Range    Bilirubin, Total 0.7 0.0 - 1.0 mg/dL    Bilirubin, Direct 0.3 <=0.5 mg/dL    Protein, Total 6.7 6.0 - 8.0 g/dL    Albumin 3.5 3.5 - 5.0 g/dL    Alkaline Phosphatase 95 45 - 120 U/L    AST 17 0 - 40 U/L    ALT 20 0 - 45 U/L   Thyroid Stimulating Hormone (TSH)   Result Value Ref Range    TSH 0.52 0.30 - 5.00 uIU/mL   BNP(B-type Natriuretic  Peptide)   Result Value Ref Range    BNP 25 0 - 59 pg/mL   Digoxin (Lanoxin )   Result Value Ref Range    Digoxin 0.7 0.5 - 2.0 ng/mL       Your diabetes is not controlled, A1c of 8.8. I like you to see our diabetic educator for diabetic teaching.     Abnormal kidney function which is foremost due to your long standing diabetes. I like you to see a kidney doctor for this.     Normal rest of your labs specifically lipids and normal BNP suggesting your heart failure is compensated.     Continue same medications. Thanks.     Please call with questions or contact us using [employer].    Sincerely,        Electronically signed by Enio Lechuga MD

## 2021-06-19 NOTE — LETTER
Letter by Enio Lechuga MD at      Author: Enio Lechuga MD Service: -- Author Type: --    Filed:  Encounter Date: 8/7/2019 Status: (Other)         Obi Messer  [address]  [place] [address]             August 7, 2019         Dear Mr. Messer,    Below are the results from your recent visit:    Resulted Orders   HM1 (CBC with Diff)   Result Value Ref Range    WBC 9.2 4.0 - 11.0 thou/uL    RBC 4.21 (L) 4.40 - 6.20 mill/uL    Hemoglobin 13.1 (L) 14.0 - 18.0 g/dL    Hematocrit 38.6 (L) 40.0 - 54.0 %    MCV 92 80 - 100 fL    MCH 31.1 27.0 - 34.0 pg    MCHC 34.0 32.0 - 36.0 g/dL    RDW 12.6 11.0 - 14.5 %    Platelets 236 140 - 440 thou/uL    MPV 6.8 (L) 7.0 - 10.0 fL    Neutrophils % 54 50 - 70 %    Lymphocytes % 34 20 - 40 %    Monocytes % 9 2 - 10 %    Eosinophils % 2 0 - 6 %    Basophils % 1 0 - 2 %    Neutrophils Absolute 5.0 2.0 - 7.7 thou/uL    Lymphocytes Absolute 3.1 0.8 - 4.4 thou/uL    Monocytes Absolute 0.9 0.0 - 0.9 thou/uL    Eosinophils Absolute 0.2 0.0 - 0.4 thou/uL    Basophils Absolute 0.1 0.0 - 0.2 thou/uL       As you and I are aware your white cell count fluctuates from high to normal.  Incidentally your hemoglobin hematocrit are also mildly decreased.  As we agreed you will see a hematologist, blood specialist.  Thanks.    Please call with questions or contact us using [employer].    Sincerely,        Electronically signed by Enio Lechuga MD

## 2021-06-19 NOTE — LETTER
Letter by Singh Holman MD at      Author: Singh Holman MD Service: -- Author Type: --    Filed:  Encounter Date: 8/15/2019 Status: (Other)       Dear Obi Messer    Thank you for choosing [facility] for your care.  We are committed to providing you with the highest quality and compassionate healthcare services.  The following information pertains to your first appointment with our clinic.    Date/Time of appointment: Monday, September 9, 2019 at 2:45 pm.    Note: Please arrive 15 minutes prior to your appointment time.  This allows time to complete forms, possible labs and nursing assessment.     Name of your Physician: Singh Holman MD    What to bring to your appointment:    Completed Patient History/Initial Nursing Assessment and Medication/Allergy List (these forms were sent to you).    Any paperwork or films from your physician that we have asked you to bring.    Your current insurance card(s).    Parking:    Please refer to the map included to direct you.  The [facility] is located at the [address] end of [address] in [place], [place].      After turning onto [address] from [address], take a right turn at the first stop sign.  We have designated parking on the left, identified as parking for Cancer Care patients (Lot D).     The Code to Enter Lot D is: 0901. This code changes monthly and will always coincide with the current month followed by 01. For example August will be 0801.  The month will continue to change but the 01 will remain constant.  If lot D is full please use Parking Lot A, directly across the street.    Please enter the Cancer Care Center on the north end of the [facility].  You will see a sign on the building.        For Hematology appointments, please take the elevator to the second floor to check in.     Also please note appointments can last 1.5-2 hours.      We hope these instructions are helpful to you.  If  you have any questions or concerns, please call us at [phone].  It is our pleasure to assist you.    Warm Regards,      Gladys Gomez    [phone]

## 2021-06-20 NOTE — LETTER
Letter by Gildardo Sykes PharmD at      Author: Gildardo Sykes PharmD Service: -- Author Type: --    Filed:  Encounter Date: 2020 Status: (Other)         06/15/20       To whom it may concern,     I am writing on behalf of my patient Pedro Luis Messer ( 53; Patient ID [id number]) to appeal the prior authorization denial for medication AndroGel (testosterone 1.62% gel). He requires this medication for treatment of hypogonadism and it is medically necessary. He has two documented low testosterone levels, despite eliminating medications that may cause low levels through working with a medication therapy management pharmacist. He is symptomatic, including fatigue, low libido, low mood, and decreased body mass. Through our evaluation, medication treatment with testosterone replacement is indicated.     Please feel free to contact me if additional information is required. Thank you for your time.       Signed,     Dr. Enio Lechuga  [facility]    [phone]

## 2021-06-20 NOTE — LETTER
Letter by Enio Lechuga MD at      Author: Enio Lechuga MD Service: -- Author Type: --    Filed:  Encounter Date: 4/6/2020 Status: (Other)         April 6, 2020     Patient: Obi Messer   YOB: 1955   Date of Visit: 4/6/2020       To Whom It May Concern:    It is my medical opinion that Obi Messer may return to work on April 20, 2020. .    If you have any questions or concerns, please don't hesitate to call.    Sincerely,        Electronically signed by Enio Lechuga MD

## 2021-06-20 NOTE — LETTER
Letter by Enio Lechuga MD at      Author: Enio Lechuga MD Service: -- Author Type: --    Filed:  Encounter Date: 12/31/2019 Status: Signed        [facility] INTERNAL MEDICINE  [address]  [place] [id number]  [phone]         Obi Messer  [address]  [place] [address]        12/31/19    Dear Obi    At [facility] we care about your health and well-being. Your primary care provider is committed to ensuring you receive high quality care and has chosen a network of specialists to assist in providing that care. Recently Dr. Lechuga referred you to Psychiatry for specialty care.     Please call your insurance company to find a provider in network at your earliest convenience or contact one of the enclosed providers for assistance in scheduling an appointment. If you have already scheduled this appointment, please disregard this notice. Thank you for choosing [facility] for your healthcare needs.       Sincerely,       [facility] Specialty Scheduling

## 2021-06-20 NOTE — LETTER
Letter by Enio Lechuga MD at      Author: Enio Lechuga MD Service: -- Author Type: --    Filed:  Encounter Date: 12/30/2019 Status: Signed         Obi Smith [facility] [address]  [place] [address]             December 30, 2019         Dear Mr. Messer,    Below are the results from your recent visit:    Resulted Orders   HM2(CBC w/o Differential)   Result Value Ref Range    WBC 13.2 (H) 4.0 - 11.0 thou/uL    RBC 4.76 4.40 - 6.20 mill/uL    Hemoglobin 14.1 14.0 - 18.0 g/dL    Hematocrit 43.2 40.0 - 54.0 %    MCV 91 80 - 100 fL    MCH 29.6 27.0 - 34.0 pg    MCHC 32.6 32.0 - 36.0 g/dL    RDW 13.2 11.0 - 14.5 %    Platelets 259 140 - 440 thou/uL    MPV 9.0 8.5 - 12.5 fL   Lipid Cascade   Result Value Ref Range    Cholesterol 108 <=199 mg/dL    Triglycerides 120 <=149 mg/dL    HDL Cholesterol 33 (L) >=40 mg/dL    LDL Calculated 51 <=129 mg/dL    Patient Fasting > 8hrs? Unknown    Glycosylated Hemoglobin A1c   Result Value Ref Range    Hemoglobin A1c 9.6 (H) 3.5 - 6.0 %   Thyroid Stimulating Hormone (TSH)   Result Value Ref Range    TSH 1.93 0.30 - 5.00 uIU/mL   Basic Metabolic Panel   Result Value Ref Range    Sodium 138 136 - 145 mmol/L    Potassium 3.5 3.5 - 5.0 mmol/L    Chloride 104 98 - 107 mmol/L    CO2 20 (L) 22 - 31 mmol/L    Anion Gap, Calculation 14 5 - 18 mmol/L    Glucose 150 (H) 70 - 125 mg/dL    Calcium 9.7 8.5 - 10.5 mg/dL    BUN 11 8 - 22 mg/dL    Creatinine 0.83 0.70 - 1.30 mg/dL    GFR MDRD Af Amer >60 >60 mL/min/1.73m2    GFR MDRD Non Af Amer >60 >60 mL/min/1.73m2    Narrative    Fasting Glucose reference range is 70-99 mg/dL per  American Diabetes Association (ADA) guidelines.   Hepatic Profile   Result Value Ref Range    Bilirubin, Total 0.4 0.0 - 1.0 mg/dL    Bilirubin, Direct 0.2 <=0.5 mg/dL    Protein, Total 7.2 6.0 - 8.0 g/dL    Albumin 3.6 3.5 - 5.0 g/dL    Alkaline Phosphatase 111 45 - 120 U/L    AST 13 0 - 40 U/L    ALT 19 0 - 45 U/L   Testosterone, Total and Free    Result Value Ref Range    Testosterone, Total 222 (L) 240 - 950 ng/dL      Comment:      This test was developed and its performance characteristics determined by the  [facility],  Special Chemistry Laboratory. It has  not been cleared or approved by the FDA. The laboratory is regulated under CLIA  as qualified to perform high-complexity testing. This test is used for clinical  purposes. It should not be regarded as investigational or for research.    Sex Hormone Bind Globulin 44 11 - 80 nmol/L    Free Testosterone Calc 3.54 (L) 4.7 - 24.4 ng/dL      Comment:        Performed and/or entered by:  [address] [address]        You still have uncontrolled diabetes, A1c of 9.6.  You also have low testosterone both total and free testosterone which account for your chronic fatigue..  I like you to see our Pharm.D.  for your diabetes and low testosterone.  I will make appointment for you.    Rest of your labs are normal.  Continue same medications for now.    Please call with questions or contact us using [employer].    Sincerely,        Electronically signed by Enio Lechuga MD

## 2021-06-20 NOTE — LETTER
Letter by Enio Lechuga MD at      Author: Enio Lechuga MD Service: -- Author Type: --    Filed:  Encounter Date: 5/27/2020 Status: (Other)         Obi Messer  [address] [facility] [address]  [place] [address]             May 27, 2020         Dear Mr. Messre,    Below are the results from your recent visit:    Resulted Orders   Comprehensive Metabolic Panel   Result Value Ref Range    Sodium 139 136 - 145 mmol/L    Potassium 3.5 3.5 - 5.0 mmol/L    Chloride 104 98 - 107 mmol/L    CO2 19 (L) 22 - 31 mmol/L    Anion Gap, Calculation 16 5 - 18 mmol/L    Glucose 57 (LL) 70 - 125 mg/dL    BUN 19 8 - 22 mg/dL    Creatinine 0.84 0.70 - 1.30 mg/dL    GFR MDRD Af Amer >60 >60 mL/min/1.73m2    GFR MDRD Non Af Amer >60 >60 mL/min/1.73m2    Bilirubin, Total 0.5 0.0 - 1.0 mg/dL    Calcium 9.1 8.5 - 10.5 mg/dL    Protein, Total 6.9 6.0 - 8.0 g/dL    Albumin 3.8 3.5 - 5.0 g/dL    Alkaline Phosphatase 97 45 - 120 U/L    AST 18 0 - 40 U/L    ALT 21 0 - 45 U/L    Narrative    Fasting Glucose reference range is 70-99 mg/dL per  American Diabetes Association (ADA) guidelines.   Glycosylated Hemoglobin A1C   Result Value Ref Range    Hemoglobin A1c 6.7 (H) 3.5 - 6.0 %   Testosterone, Free and Total   Result Value Ref Range    Testosterone, Total 236 (L) 240 - 950 ng/dL      Comment:      This test was developed and its performance characteristics determined by the  [facility] Special Chemistry Laboratory.  It has not been cleared or approved by the FDA. The laboratory is regulated  under CLIA as qualified to perform high-complexity testing. This test is used  for clinical purposes. It should not be regarded as investigational or for  research.    Sex Hormone Bind Globulin 49 11 - 80 nmol/L    Free Testosterone Calc 3.51 (L) 4.7 - 24.4 ng/dL      Comment:        Performed and/or entered by:  [facility]  [address] [place] [address]       Slightly  decreased testosterone. I like you to see our pharm D to discuss possible treatment options. Otherwise, as we discussed all your other labs are good except for low blood sugar. Thanks.     Please call with questions or contact us using [employer].    Sincerely,        Electronically signed by Enio Lechuga MD

## 2021-06-20 NOTE — LETTER
Letter by Enio Lechuga MD at      Author: Enio Lechuga MD Service: -- Author Type: --    Filed:  Encounter Date: 9/10/2020 Status: (Other)         Obi Messer  [address] [facility] [address]  [place] [address]             September 10, 2020         Dear Mr. Messer,    Below are the results from your recent visit:    Resulted Orders   HM2(CBC w/o Differential)   Result Value Ref Range    WBC 9.6 4.0 - 11.0 thou/uL    RBC 4.96 4.40 - 6.20 mill/uL    Hemoglobin 14.8 14.0 - 18.0 g/dL    Hematocrit 44.5 40.0 - 54.0 %    MCV 90 80 - 100 fL    MCH 29.8 27.0 - 34.0 pg    MCHC 33.2 32.0 - 36.0 g/dL    RDW 13.6 11.0 - 14.5 %    Platelets 359 140 - 440 thou/uL    MPV 6.4 (L) 7.0 - 10.0 fL   Lipid Cascade   Result Value Ref Range    Cholesterol 99 <=199 mg/dL    Triglycerides 115 <=149 mg/dL    HDL Cholesterol 33 (L) >=40 mg/dL    LDL Calculated 43 <=129 mg/dL    Patient Fasting > 8hrs? Yes    Glycosylated Hemoglobin A1c   Result Value Ref Range    Hemoglobin A1c 6.7 (H) <=5.6 %      Comment:      Normal <5.7% Prediabete 5.7-6.4% Diabletes 6.5% or higher - adopted from ADA consensus guidelines   Thyroid Stimulating Hormone (TSH)   Result Value Ref Range    TSH 1.06 0.30 - 5.00 uIU/mL   Basic Metabolic Panel   Result Value Ref Range    Sodium 138 136 - 145 mmol/L    Potassium 3.4 (L) 3.5 - 5.0 mmol/L    Chloride 106 98 - 107 mmol/L    CO2 18 (L) 22 - 31 mmol/L    Anion Gap, Calculation 14 5 - 18 mmol/L    Glucose 62 (L) 70 - 125 mg/dL    Calcium 9.3 8.5 - 10.5 mg/dL    BUN 12 8 - 22 mg/dL    Creatinine 0.78 0.70 - 1.30 mg/dL    GFR MDRD Af Amer >60 >60 mL/min/1.73m2    GFR MDRD Non Af Amer >60 >60 mL/min/1.73m2    Narrative    Fasting Glucose reference range is 70-99 mg/dL per  American Diabetes Association (ADA) guidelines.   Hepatic Profile   Result Value Ref Range    Bilirubin, Total 0.3 0.0 - 1.0 mg/dL    Bilirubin, Direct 0.2 <=0.5 mg/dL    Protein, Total 7.5 6.0 - 8.0 g/dL    Albumin 3.6 3.5 - 5.0 g/dL     Alkaline Phosphatase 111 45 - 120 U/L    AST 22 0 - 40 U/L    ALT 33 0 - 45 U/L   Digoxin (Lanoxin )   Result Value Ref Range    Digoxin 0.4 (L) 0.5 - 2.0 ng/mL       Good diabetes control. Normal lipids. Normal all other labs with some insignificant changes of no clinical importance.     Continue same medications. Thanks.     Please call with questions or contact us using [employer].    Sincerely,        Electronically signed by Enio Lechuga MD

## 2021-06-27 NOTE — PROGRESS NOTES
Progress Notes by Shawn Caldera MD (Ted) at 7/3/2019  4:10 PM     Author: Shawn Caldera MD (Ted) Service: -- Author Type: Physician    Filed: 7/3/2019  4:24 PM Encounter Date: 7/3/2019 Status: Signed    : Shawn Caldera MD (Ted) (Physician)           Click to link to [facility] HEART CARE NOTE    Thank you, Dr. Lechuga, for asking the [facility] to evaluate Mr. Obi Messer.      Assessment/Recommendations   Assessment:    Chronic dyspnea, unchanged, multifactorial, probably related to morbid obesity and deconditioning.  Normal BNP would suggest no significant decompensation of heart failure.  Coronary artery disease with history of stenting, no angina  Lower extremity edema with chronic venous dermatitis  Bilateral complicated knee replacement with likely disruption of lymphatic and venous return from the lower extremities  Chronic kidney disease  Morbid obesity  Chronic atrial fibrillation on anticoagulation    Plan:  D-dimer to check for thrombosis  Leg elevation and compression stockings.  Consider vascular clinic for treatment of long-term edema/lymphedema    I would not increase diuretic considering normal BNP and clear lungs.  I am afraid we may cause more problems with the kidneys by escalating diuretics.    Likewise coronary assessment is not necessary and potentially dangerous to his kidneys at this point. His recent stress test showed only minimal area of ischemia which could hardly explain any of his symptoms.  I suspect the stress test was falsely positive.    Should follow-up with his regular cardiologist Dr. Cagle at [facility] or with us if he prefers to switch cardiology services       History of Present Illness    Mr. Obi Messer is a 64 y.o. male who comes in for cardiology evaluation rapid access clinic.  He is a patient of Dr. Lechuga at [facility] and Dr. Cagle at [facility].   He has history of cardiomyopathy and coronary artery disease.  He is a history of remote stenting.  He has had complete recovery of LV systolic function.  The most recent echo showed preserved LV systolic function without significant wall motion abnormalities.  He had mildly abnormal stress test with small area of ischemia.  He was seen by Dr. Cagle and December 2018.  She decided against a invasive evaluation.  The patient states that he does not get any exertional chest pain.  He has had chronic dyspnea.  He denies any worsening of dyspnea as of late.  He also reports a long-standing swelling of lower legs.  He has had redness of right foot for which he was given antibiotics by his primary physician.  He has a history of bilateral knee replacement.  The right knee replacement was complicated requiring several surgeries.  He does not have a history of DVT.  He takes a anticoagulation because of history of atrial fibrillation.  He denies heart palpitations or syncope.    ECG: Personally reviewed.  2070 atrial fibrillation and nonspecific ST-T abnormalities    STRESS TEST, 12/14/18  Perfusion images show small of mildly reduced uptake in the mid to basal inferolateral wall which appears to be reversible on  comparison to resting images. This is indicative of mild ischemia in this region.  Normal left ventricular size and systolic function with a calculated LVEF of 62%.  Normal left ventricular wall motion.    ECHOCARDIOGRAM, 2/22/18:   1. Normal left ventricular chamber size. Normal left ventricular wall thickness. No regional wall motion abnormalities.Normal left ventricular systolic function. Calculated left ventricular ejection fraction (modified Barry technique) is 53 %.  2.  Normal right ventricular chamber size. Normal right ventricular systolic function.  3. Moderate left atrial dilation. Mild to moderate right atrial dilation.  4. Aortic valve sclerosis without stenosis.  5. Mitral annular calcification.  Mild mitral valve regurgitation.   When compared to the previous echocardiographic images of 7/23/14, no regional wall motion noted on current study.  Estimated EF:              50-55%     CORONARY ANGIOGRAM, 8/8/14:                        The previously deployed coronary stents all remain widely patent.                          The LAD has moderate diffuse disease.                          The circumflex artery has moderate diffuse disease.                          The RCA has moderate diffuse disease.                        There is a focal mid rca lesion that we evaluated by FFR and was normal as below.                          Baseline FFR measured in RCA was 0.95                          Post adenosine FFR measured in RCA was 0.95       Physical Examination Review of Systems   Vitals:    07/03/19 1552   BP: 102/60   Pulse: 68   Resp: 18     Body mass index is 44.55 kg/m .  Wt Readings from Last 3 Encounters:   07/03/19 (!) 293 lb (132.9 kg)   06/25/19 (!) 292 lb (132.5 kg)   12/05/17 (!) 254 lb 6 oz (115.4 kg)     General Appearance:   Alert, cooperative, no distress, appears stated age   Head/ENT: Normocephalic, without obvious abnormality. Membranes moist      EYES:  no scleral icterus, normal conjunctivae   Neck: Supple, symmetrical, trachea midline, no adenopathy, thyroid: not enlarged, symmetric, no carotid bruit or JVD   Chest/Lungs:   Lungs are clear to auscultation, respirations unlabored. No tenderness or deformity    Cardiovascular:   irregular rhythm, S1, S2 normal, no murmur, rub or gallop.   Abdomen:  Soft, non-tender, bowel sounds active all four quadrants,  no masses, no organomegaly   Extremities: no cyanosis or clubbing.  2+ edema   Skin: Skin color, texture, turgor normal, no rashes or lesions.    Psychiatric: Normal affect, calm   Neurologic: Alert and oriented x 3, moving all four extremities.     General: WNL  Eyes: WNL  Ears/Nose/Throat: WNL  Lungs: WNL  Heart: WNL  Stomach: WNL  Bladder:  WNL  Muscle/Joints: WNL  Skin: WNL  Nervous System: WNL  Mental Health: WNL     Blood: WNL     Medical History  Surgical History Family History Social History   Past Medical History:   Diagnosis Date   ? Acute renal impairment    ? Atrial flutter (H)    ? Bereavement, uncomplicated    ? Bipolar affective disorder (H)    ? Bursitis of shoulder    ? Cervical vertebral fusion    ? CHF (congestive heart failure) (H)    ? Closed fracture of proximal end of right tibia with routine healing    ? Coronary artery disease    ? Depression with anxiety    ? Dermatomyositis (H)    ? Dyslipidemia    ? Encounter for screening for malignant neoplasm of prostate    ? Esophagitis    ? ETOH abuse     no longer drinking   ? Fracture of proximal end of humerus    ? History of smoking    ? Hyperlipidemia    ? Hypertension    ? Knee pain    ? Major depressive disorder    ? Medial meniscus tear    ? Morbid obesity (H)    ? Osteoarthritis of knee    ? Paroxysmal atrial fibrillation (H)    ? Polyp of colon    ? Status post closed fracture of right tibia    ? Type 2 diabetes mellitus (H)     Past Surgical History:   Procedure Laterality Date   ? CERVICAL 5  2004    DIALTION AND FUSION   ? CHOLECYSTECTOMY     ? LEFT TOTAL KNEE ARTHROPLASTY   2016   ? QUADRICEPS TENDON REPAIR Right 12/5/2017    Procedure: RIGHT KNEE QUADRICEPS TENDON REPAIR;  Surgeon: Sean Ponce DO;  Location: [facility] OR;  Service:    ? REPLACEMENT TOTAL KNEE Right    ? TONSILECTOMY, ADENOIDECTOMY, BILATERAL MYRINGOTOMY AND TUBES  AGE 11    no family history of premature coronary artery disease Social History     Socioeconomic History   ? Marital status:      Spouse name: Not on file   ? Number of children: Not on file   ? Years of education: Not on file   ? Highest education level: Not on file   Occupational History   ? Not on file   Social Needs   ? Financial resource strain: Not on file   ? Food insecurity:     Worry: Not on file     Inability: Not  on file   ? Transportation needs:     Medical: Not on file     Non-medical: Not on file   Tobacco Use   ? Smoking status: Former Smoker     Last attempt to quit: 2014     Years since quittin.4   ? Smokeless tobacco: Current User   Substance and Sexual Activity   ? Alcohol use: No   ? Drug use: No   ? Sexual activity: Not on file   Lifestyle   ? Physical activity:     Days per week: Not on file     Minutes per session: Not on file   ? Stress: Not on file   Relationships   ? Social connections:     Talks on phone: Not on file     Gets together: Not on file     Attends Oriental orthodox service: Not on file     Active member of club or organization: Not on file     Attends meetings of clubs or organizations: Not on file     Relationship status: Not on file   ? Intimate partner violence:     Fear of current or ex partner: Not on file     Emotionally abused: Not on file     Physically abused: Not on file     Forced sexual activity: Not on file   Other Topics Concern   ? Not on file   Social History Narrative     in May, 2018. 3 grown children, 2 sons and 1 daughter. 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 Nonsmoker. Socially drinks alcohol 2 times a month.           Medications  Allergies   Current Outpatient Medications   Medication Sig Dispense Refill   ? atorvastatin (LIPITOR) 40 MG tablet Take 40 mg by mouth at bedtime.     ? CARTIA  mg 24 hr capsule 120 mg daily.            ? cephalexin (KEFLEX) 500 MG capsule Take 1 capsule (500 mg total) by mouth 4 (four) times a day for 10 days. 40 capsule 0   ? digoxin (LANOXIN) 250 mcg tablet Take 250 mcg by mouth daily.      ? DULoxetine (CYMBALTA) 60 MG capsule Take 180 mg by mouth daily.            ? furosemide (LASIX) 40 MG tablet Take 40 mg by mouth 2 (two) times a day as needed (swelling).      ? HYDROcodone-acetaminophen 5-325 mg per tablet Take 1 tablet by mouth 2 (two) times a day. 30 tablet 0   ? ibuprofen (ADVIL,MOTRIN) 600 MG tablet Take 600 mg by mouth  every 6 (six) hours as needed for pain.     ? lisinopril (PRINIVIL,ZESTRIL) 20 MG tablet Take 20 mg by mouth daily.     ? LORazepam (ATIVAN) 0.5 MG tablet Take 0.5 mg by mouth 2 (two) times a day as needed for anxiety.      ? metFORMIN (GLUCOPHAGE) 1000 MG tablet Take 2,000 mg by mouth daily with breakfast.            ? metoprolol tartrate (LOPRESSOR) 100 MG tablet Take 100 mg by mouth 2 (two) times a day.     ? QUEtiapine (SEROQUEL) 100 MG tablet Take 100 mg by mouth at bedtime.     ? QUEtiapine (SEROQUEL) 25 MG tablet Take 25 mg by mouth 3 (three) times a day. Morning, afternoon and evening     ? senna-docusate (SENNOSIDES-DOCUSATE SODIUM) 8.6-50 mg tablet Take 1 tablet by mouth 2 (two) times a day as needed for constipation.      ? spironolactone (ALDACTONE) 25 MG tablet Take 25 mg by mouth daily.     ? VICTOZA 3-AUBREY 0.6 mg/0.1 mL (18 mg/3 mL) PnIj injection INJECT 1.8MG SC QD  11   ? zolpidem (AMBIEN) 10 mg tablet Take 10 mg by mouth at bedtime as needed for sleep.     ? polyvinyl alcohol (LIQUIFILM TEARS) 1.4 % ophthalmic solution Administer 1 drop to both eyes as needed for dry eyes.     ? rivaroxaban 20 mg Tab Take 20 mg by mouth daily with supper.        No current facility-administered medications for this visit.       No Known Allergies      Lab Results    Chemistry/lipid CBC Cardiac Enzymes/BNP/TSH/INR   Lab Results   Component Value Date    CHOL 105 06/25/2019    HDL 38 (L) 06/25/2019    LDLCALC 45 06/25/2019    TRIG 108 06/25/2019    CREATININE 1.51 (H) 06/25/2019    BUN 25 (H) 06/25/2019    K 4.4 06/25/2019     (L) 06/25/2019    CL 95 (L) 06/25/2019    CO2 24 06/25/2019    Lab Results   Component Value Date    WBC 15.4 (H) 06/25/2019    HGB 14.4 06/25/2019    HCT 41.6 06/25/2019    MCV 91 06/25/2019     06/25/2019    Lab Results   Component Value Date    BNP 25 06/25/2019    TSH 0.52 06/25/2019    INR 1.06 12/05/2017

## 2021-07-03 NOTE — ADDENDUM NOTE
Addendum Note by Gildardo Sykes PharmD at 1/6/2020  2:00 PM     Author: Gildardo Sykes PharmD Service: -- Author Type: Pharmacist    Filed: 1/7/2020  8:29 AM Date of Service: 1/6/2020  2:00 PM Status: Signed    : Gildardo Sykes PharmD (Pharmacist)    Encounter addended by: Gildardo Sykes PharmD on: 1/7/2020  8:29 AM      Actions taken: Clinical Note Signed

## 2021-07-03 NOTE — ADDENDUM NOTE
Addendum Note by Gildardo Hendrickson, PharmJEN at 6/10/2020 11:47 AM     Author: Gildardo Hendrickson, Kenyatta Service: -- Author Type: Pharmacist    Filed: 6/10/2020 11:47 AM Encounter Date: 6/9/2020 Status: Signed    : Gildardo Hendrickson, Kenyatta (Pharmacist)    Addended by: GILDARDO HENDRICKSON on: 6/10/2020 11:47 AM        Modules accepted: Orders

## 2021-07-03 NOTE — ADDENDUM NOTE
Addendum Note by Enio Lechuga MD at 6/10/2020 12:18 PM     Author: Enio Lechuga MD Service: -- Author Type: Physician    Filed: 6/10/2020 12:18 PM Encounter Date: 6/9/2020 Status: Signed    : Enio Lechuga MD (Physician)    Addended by: ENIO LECHUGA on: 6/10/2020 12:18 PM        Modules accepted: Orders

## 2021-08-20 NOTE — TELEPHONE ENCOUNTER
-- DO NOT REPLY / DO NOT REPLY ALL --  -- Message is from the [facility]--    Offered Waitlist if Available for the Visit Type? No    Caller is requesting an appointment - at a sooner time than what was available.      Caller wants sooner appointment - offered trusted partner & sister site            Patient is willing to be seen by: PCP only    Reason for Visit: Head and neck pain, stiff neck, sore throat, asthma.    Is the patient currently scheduled? Yes.  Appointment date:  8/26 at 11:00am    Preferred time to be seen: Anytime before that, unavailable on 8/24 at 9:00am, available later that day. Does not have [employer] lien but would like a call if anything sooner opens up.     Caller Information       Type Contact Phone    08/20/2021 10:31 AM CDT Phone (Incoming) Juana Allen (Self) [phone] (H)          Alternative phone number: [phone] cell    Turnaround time given to caller:   \"This message will be sent to [state Provider's name]. The clinical team will fulfill your request as soon as they review your message.\"   Try over the counter calcium, comes with vitamin D. Try oscal and  the likes twice a day.

## 2021-09-05 ENCOUNTER — HEALTH MAINTENANCE LETTER (OUTPATIENT)
Age: 66
End: 2021-09-05

## 2021-12-26 ENCOUNTER — HEALTH MAINTENANCE LETTER (OUTPATIENT)
Age: 66
End: 2021-12-26

## 2022-03-09 NOTE — ANESTHESIA POSTPROCEDURE EVALUATION
Patient: Obi Messer  RIGHT KNEE QUADRICEPS TENDON REPAIR  Anesthesia type: spinal    Patient location: PACU  Last vitals:   Vitals:    12/05/17 1045   BP: 154/76   Pulse: 89   Resp: 18   Temp:    SpO2: 97%     Post vital signs: stable  Level of consciousness: awake, alert and oriented  Post-anesthesia pain: pain controlled  Post-anesthesia nausea and vomiting: no  Pulmonary: unassisted, return to baseline  Cardiovascular: stable and blood pressure at baseline  Hydration: adequate  Anesthetic events: no    QCDR Measures:  ASA# 11 - Hallie-op Cardiac Arrest: ASA11B - Patient did NOT experience unanticipated cardiac arrest  ASA# 12 - Hallie-op Mortality Rate: ASA12B - Patient did NOT die  ASA# 13 - PACU Re-Intubation Rate: NA - No ETT / LMA used for case  ASA# 10 - Composite Anes Safety: ASA10A - No serious adverse event    Additional Notes:  
yes

## 2022-04-17 ENCOUNTER — HEALTH MAINTENANCE LETTER (OUTPATIENT)
Age: 67
End: 2022-04-17

## 2022-08-07 ENCOUNTER — HEALTH MAINTENANCE LETTER (OUTPATIENT)
Age: 67
End: 2022-08-07

## 2022-10-23 ENCOUNTER — HEALTH MAINTENANCE LETTER (OUTPATIENT)
Age: 67
End: 2022-10-23

## 2023-04-02 ENCOUNTER — HEALTH MAINTENANCE LETTER (OUTPATIENT)
Age: 68
End: 2023-04-02

## 2023-06-01 ENCOUNTER — HEALTH MAINTENANCE LETTER (OUTPATIENT)
Age: 68
End: 2023-06-01

## 2025-01-19 ENCOUNTER — APPOINTMENT (OUTPATIENT)
Dept: GENERAL RADIOLOGY | Facility: CLINIC | Age: 70
End: 2025-01-19
Attending: EMERGENCY MEDICINE
Payer: COMMERCIAL

## 2025-01-19 ENCOUNTER — APPOINTMENT (OUTPATIENT)
Dept: CT IMAGING | Facility: CLINIC | Age: 70
End: 2025-01-19
Attending: EMERGENCY MEDICINE
Payer: COMMERCIAL

## 2025-01-19 ENCOUNTER — HOSPITAL ENCOUNTER (OUTPATIENT)
Facility: CLINIC | Age: 70
Setting detail: OBSERVATION
Discharge: STILL ADMITTED OR ADMINISTRATIVE | End: 2025-01-22
Attending: EMERGENCY MEDICINE | Admitting: HOSPITALIST
Payer: COMMERCIAL

## 2025-01-19 DIAGNOSIS — R53.1 WEAKNESS GENERALIZED: ICD-10-CM

## 2025-01-19 LAB
ALBUMIN SERPL BCG-MCNC: 3.9 G/DL (ref 3.5–5.2)
ALBUMIN UR-MCNC: NEGATIVE MG/DL
ALP SERPL-CCNC: 111 U/L (ref 40–150)
ALT SERPL W P-5'-P-CCNC: 18 U/L (ref 0–70)
ANION GAP SERPL CALCULATED.3IONS-SCNC: 10 MMOL/L (ref 7–15)
APPEARANCE UR: CLEAR
AST SERPL W P-5'-P-CCNC: 21 U/L (ref 0–45)
ATRIAL RATE - MUSE: NORMAL BPM
BACTERIA #/AREA URNS HPF: ABNORMAL /HPF
BASOPHILS # BLD AUTO: 0 10E3/UL (ref 0–0.2)
BASOPHILS NFR BLD AUTO: 0 %
BILIRUB SERPL-MCNC: 0.3 MG/DL
BILIRUB UR QL STRIP: NEGATIVE
BUN SERPL-MCNC: 23.6 MG/DL (ref 8–23)
CALCIUM SERPL-MCNC: 8.9 MG/DL (ref 8.8–10.4)
CHLORIDE SERPL-SCNC: 107 MMOL/L (ref 98–107)
COLOR UR AUTO: ABNORMAL
CREAT SERPL-MCNC: 0.99 MG/DL (ref 0.67–1.17)
DIASTOLIC BLOOD PRESSURE - MUSE: NORMAL MMHG
EGFRCR SERPLBLD CKD-EPI 2021: 82 ML/MIN/1.73M2
EOSINOPHIL # BLD AUTO: 0.2 10E3/UL (ref 0–0.7)
EOSINOPHIL NFR BLD AUTO: 2 %
ERYTHROCYTE [DISTWIDTH] IN BLOOD BY AUTOMATED COUNT: 16.3 % (ref 10–15)
GLUCOSE SERPL-MCNC: 125 MG/DL (ref 70–99)
GLUCOSE UR STRIP-MCNC: NEGATIVE MG/DL
HCO3 SERPL-SCNC: 20 MMOL/L (ref 22–29)
HCT VFR BLD AUTO: 39.8 % (ref 40–53)
HGB BLD-MCNC: 12.5 G/DL (ref 13.3–17.7)
HGB UR QL STRIP: NEGATIVE
HOLD SPECIMEN: NORMAL
IMM GRANULOCYTES # BLD: 0 10E3/UL
IMM GRANULOCYTES NFR BLD: 0 %
INTERPRETATION ECG - MUSE: NORMAL
KETONES UR STRIP-MCNC: NEGATIVE MG/DL
LACTATE SERPL-SCNC: 1.8 MMOL/L (ref 0.7–2)
LEUKOCYTE ESTERASE UR QL STRIP: NEGATIVE
LYMPHOCYTES # BLD AUTO: 2.3 10E3/UL (ref 0.8–5.3)
LYMPHOCYTES NFR BLD AUTO: 25 %
MCH RBC QN AUTO: 28.3 PG (ref 26.5–33)
MCHC RBC AUTO-ENTMCNC: 31.4 G/DL (ref 31.5–36.5)
MCV RBC AUTO: 90 FL (ref 78–100)
MONOCYTES # BLD AUTO: 0.6 10E3/UL (ref 0–1.3)
MONOCYTES NFR BLD AUTO: 7 %
NEUTROPHILS # BLD AUTO: 5.9 10E3/UL (ref 1.6–8.3)
NEUTROPHILS NFR BLD AUTO: 66 %
NITRATE UR QL: NEGATIVE
NRBC # BLD AUTO: 0 10E3/UL
NRBC BLD AUTO-RTO: 0 /100
NT-PROBNP SERPL-MCNC: 1326 PG/ML (ref 0–900)
P AXIS - MUSE: NORMAL DEGREES
PH UR STRIP: 6.5 [PH] (ref 5–7)
PLATELET # BLD AUTO: 176 10E3/UL (ref 150–450)
POTASSIUM SERPL-SCNC: 4.3 MMOL/L (ref 3.4–5.3)
PR INTERVAL - MUSE: NORMAL MS
PROT SERPL-MCNC: 7 G/DL (ref 6.4–8.3)
QRS DURATION - MUSE: 114 MS
QT - MUSE: 430 MS
QTC - MUSE: 495 MS
R AXIS - MUSE: 78 DEGREES
RBC # BLD AUTO: 4.41 10E6/UL (ref 4.4–5.9)
RBC URINE: 1 /HPF
SODIUM SERPL-SCNC: 137 MMOL/L (ref 135–145)
SP GR UR STRIP: 1.01 (ref 1–1.03)
SYSTOLIC BLOOD PRESSURE - MUSE: NORMAL MMHG
T AXIS - MUSE: 66 DEGREES
TROPONIN T SERPL HS-MCNC: 17 NG/L
TROPONIN T SERPL HS-MCNC: 17 NG/L
UROBILINOGEN UR STRIP-MCNC: NORMAL MG/DL
VENTRICULAR RATE- MUSE: 80 BPM
WBC # BLD AUTO: 9 10E3/UL (ref 4–11)
WBC URINE: 2 /HPF

## 2025-01-19 PROCEDURE — 83036 HEMOGLOBIN GLYCOSYLATED A1C: CPT

## 2025-01-19 PROCEDURE — 93308 TTE F-UP OR LMTD: CPT | Performed by: EMERGENCY MEDICINE

## 2025-01-19 PROCEDURE — 84443 ASSAY THYROID STIM HORMONE: CPT

## 2025-01-19 PROCEDURE — 258N000003 HC RX IP 258 OP 636: Performed by: EMERGENCY MEDICINE

## 2025-01-19 PROCEDURE — 71046 X-RAY EXAM CHEST 2 VIEWS: CPT | Mod: 26 | Performed by: RADIOLOGY

## 2025-01-19 PROCEDURE — 99285 EMERGENCY DEPT VISIT HI MDM: CPT | Mod: 25 | Performed by: EMERGENCY MEDICINE

## 2025-01-19 PROCEDURE — 93010 ELECTROCARDIOGRAM REPORT: CPT | Mod: 59 | Performed by: EMERGENCY MEDICINE

## 2025-01-19 PROCEDURE — 93308 TTE F-UP OR LMTD: CPT | Mod: 26 | Performed by: EMERGENCY MEDICINE

## 2025-01-19 PROCEDURE — 84145 PROCALCITONIN (PCT): CPT

## 2025-01-19 PROCEDURE — 83605 ASSAY OF LACTIC ACID: CPT | Performed by: EMERGENCY MEDICINE

## 2025-01-19 PROCEDURE — 70450 CT HEAD/BRAIN W/O DYE: CPT | Mod: 26 | Performed by: RADIOLOGY

## 2025-01-19 PROCEDURE — 85610 PROTHROMBIN TIME: CPT | Performed by: EMERGENCY MEDICINE

## 2025-01-19 PROCEDURE — 36415 COLL VENOUS BLD VENIPUNCTURE: CPT | Performed by: EMERGENCY MEDICINE

## 2025-01-19 PROCEDURE — 70450 CT HEAD/BRAIN W/O DYE: CPT

## 2025-01-19 PROCEDURE — 82550 ASSAY OF CK (CPK): CPT

## 2025-01-19 PROCEDURE — 82310 ASSAY OF CALCIUM: CPT | Performed by: EMERGENCY MEDICINE

## 2025-01-19 PROCEDURE — 96360 HYDRATION IV INFUSION INIT: CPT | Performed by: EMERGENCY MEDICINE

## 2025-01-19 PROCEDURE — 82947 ASSAY GLUCOSE BLOOD QUANT: CPT | Performed by: EMERGENCY MEDICINE

## 2025-01-19 PROCEDURE — 84484 ASSAY OF TROPONIN QUANT: CPT | Performed by: EMERGENCY MEDICINE

## 2025-01-19 PROCEDURE — 83880 ASSAY OF NATRIURETIC PEPTIDE: CPT | Performed by: EMERGENCY MEDICINE

## 2025-01-19 PROCEDURE — 81001 URINALYSIS AUTO W/SCOPE: CPT | Performed by: EMERGENCY MEDICINE

## 2025-01-19 PROCEDURE — 71046 X-RAY EXAM CHEST 2 VIEWS: CPT

## 2025-01-19 PROCEDURE — 250N000011 HC RX IP 250 OP 636: Performed by: EMERGENCY MEDICINE

## 2025-01-19 PROCEDURE — 93005 ELECTROCARDIOGRAM TRACING: CPT | Performed by: EMERGENCY MEDICINE

## 2025-01-19 PROCEDURE — 86140 C-REACTIVE PROTEIN: CPT

## 2025-01-19 PROCEDURE — 96361 HYDRATE IV INFUSION ADD-ON: CPT | Performed by: EMERGENCY MEDICINE

## 2025-01-19 PROCEDURE — 85025 COMPLETE CBC W/AUTO DIFF WBC: CPT | Performed by: EMERGENCY MEDICINE

## 2025-01-19 RX ORDER — FUROSEMIDE 10 MG/ML
40 INJECTION INTRAMUSCULAR; INTRAVENOUS ONCE
Status: COMPLETED | OUTPATIENT
Start: 2025-01-19 | End: 2025-01-19

## 2025-01-19 RX ADMIN — SODIUM CHLORIDE 1000 ML: 9 INJECTION, SOLUTION INTRAVENOUS at 22:22

## 2025-01-19 ASSESSMENT — ACTIVITIES OF DAILY LIVING (ADL)
ADLS_ACUITY_SCORE: 41

## 2025-01-20 ENCOUNTER — APPOINTMENT (OUTPATIENT)
Dept: OCCUPATIONAL THERAPY | Facility: CLINIC | Age: 70
End: 2025-01-20
Payer: COMMERCIAL

## 2025-01-20 ENCOUNTER — APPOINTMENT (OUTPATIENT)
Dept: CARDIOLOGY | Facility: CLINIC | Age: 70
End: 2025-01-20
Payer: COMMERCIAL

## 2025-01-20 ENCOUNTER — APPOINTMENT (OUTPATIENT)
Dept: MRI IMAGING | Facility: CLINIC | Age: 70
End: 2025-01-20
Payer: COMMERCIAL

## 2025-01-20 PROBLEM — R53.1 WEAKNESS GENERALIZED: Status: ACTIVE | Noted: 2025-01-20

## 2025-01-20 LAB
ANION GAP SERPL CALCULATED.3IONS-SCNC: 11 MMOL/L (ref 7–15)
APTT PPP: 31 SECONDS (ref 22–38)
BUN SERPL-MCNC: 20 MG/DL (ref 8–23)
CALCIUM SERPL-MCNC: 9.2 MG/DL (ref 8.8–10.4)
CHLORIDE SERPL-SCNC: 108 MMOL/L (ref 98–107)
CK SERPL-CCNC: 159 U/L (ref 39–308)
CREAT SERPL-MCNC: 0.94 MG/DL (ref 0.67–1.17)
CRP SERPL-MCNC: 15.2 MG/L
EGFRCR SERPLBLD CKD-EPI 2021: 87 ML/MIN/1.73M2
ERYTHROCYTE [DISTWIDTH] IN BLOOD BY AUTOMATED COUNT: 16.2 % (ref 10–15)
EST. AVERAGE GLUCOSE BLD GHB EST-MCNC: 131 MG/DL
FLUAV RNA SPEC QL NAA+PROBE: NEGATIVE
FLUBV RNA RESP QL NAA+PROBE: NEGATIVE
GLUCOSE BLDC GLUCOMTR-MCNC: 166 MG/DL (ref 70–99)
GLUCOSE BLDC GLUCOMTR-MCNC: 82 MG/DL (ref 70–99)
GLUCOSE SERPL-MCNC: 83 MG/DL (ref 70–99)
HBA1C MFR BLD: 6.2 %
HCO3 SERPL-SCNC: 21 MMOL/L (ref 22–29)
HCT VFR BLD AUTO: 41 % (ref 40–53)
HGB BLD-MCNC: 13.3 G/DL (ref 13.3–17.7)
INR PPP: 1.18 (ref 0.85–1.15)
INR PPP: 1.26 (ref 0.85–1.15)
LVEF ECHO: NORMAL
MCH RBC QN AUTO: 28.9 PG (ref 26.5–33)
MCHC RBC AUTO-ENTMCNC: 32.4 G/DL (ref 31.5–36.5)
MCV RBC AUTO: 89 FL (ref 78–100)
PLATELET # BLD AUTO: 174 10E3/UL (ref 150–450)
POTASSIUM SERPL-SCNC: 3.6 MMOL/L (ref 3.4–5.3)
PROCALCITONIN SERPL IA-MCNC: 0.02 NG/ML
RBC # BLD AUTO: 4.61 10E6/UL (ref 4.4–5.9)
RSV RNA SPEC NAA+PROBE: NEGATIVE
SARS-COV-2 RNA RESP QL NAA+PROBE: NEGATIVE
SODIUM SERPL-SCNC: 140 MMOL/L (ref 135–145)
TSH SERPL DL<=0.005 MIU/L-ACNC: 0.61 UIU/ML (ref 0.3–4.2)
WBC # BLD AUTO: 8.1 10E3/UL (ref 4–11)

## 2025-01-20 PROCEDURE — 70551 MRI BRAIN STEM W/O DYE: CPT

## 2025-01-20 PROCEDURE — 250N000013 HC RX MED GY IP 250 OP 250 PS 637

## 2025-01-20 PROCEDURE — 999N000147 HC STATISTIC PT IP EVAL DEFER

## 2025-01-20 PROCEDURE — G0378 HOSPITAL OBSERVATION PER HR: HCPCS

## 2025-01-20 PROCEDURE — 85730 THROMBOPLASTIN TIME PARTIAL: CPT | Performed by: EMERGENCY MEDICINE

## 2025-01-20 PROCEDURE — 85027 COMPLETE CBC AUTOMATED: CPT

## 2025-01-20 PROCEDURE — 36415 COLL VENOUS BLD VENIPUNCTURE: CPT | Performed by: EMERGENCY MEDICINE

## 2025-01-20 PROCEDURE — 80048 BASIC METABOLIC PNL TOTAL CA: CPT

## 2025-01-20 PROCEDURE — 99207 PR NO BILLABLE SERVICE THIS VISIT: CPT

## 2025-01-20 PROCEDURE — 97165 OT EVAL LOW COMPLEX 30 MIN: CPT | Mod: GO

## 2025-01-20 PROCEDURE — 97530 THERAPEUTIC ACTIVITIES: CPT | Mod: GO

## 2025-01-20 PROCEDURE — 70551 MRI BRAIN STEM W/O DYE: CPT | Mod: 26 | Performed by: RADIOLOGY

## 2025-01-20 PROCEDURE — 250N000013 HC RX MED GY IP 250 OP 250 PS 637: Performed by: HOSPITALIST

## 2025-01-20 PROCEDURE — 93306 TTE W/DOPPLER COMPLETE: CPT | Mod: 26 | Performed by: INTERNAL MEDICINE

## 2025-01-20 PROCEDURE — 82962 GLUCOSE BLOOD TEST: CPT

## 2025-01-20 PROCEDURE — 87637 SARSCOV2&INF A&B&RSV AMP PRB: CPT

## 2025-01-20 PROCEDURE — 93306 TTE W/DOPPLER COMPLETE: CPT

## 2025-01-20 PROCEDURE — 99223 1ST HOSP IP/OBS HIGH 75: CPT | Mod: GC | Performed by: INTERNAL MEDICINE

## 2025-01-20 PROCEDURE — 85610 PROTHROMBIN TIME: CPT

## 2025-01-20 RX ORDER — QUETIAPINE FUMARATE 200 MG/1
400 TABLET, FILM COATED ORAL AT BEDTIME
Status: DISCONTINUED | OUTPATIENT
Start: 2025-01-20 | End: 2025-01-22 | Stop reason: HOSPADM

## 2025-01-20 RX ORDER — POLYETHYLENE GLYCOL 3350 17 G/17G
1 POWDER, FOR SOLUTION ORAL DAILY PRN
Status: ON HOLD | COMMUNITY

## 2025-01-20 RX ORDER — CHOLECALCIFEROL (VITAMIN D3) 1250 MCG
1250 CAPSULE ORAL DAILY
Status: DISCONTINUED | OUTPATIENT
Start: 2025-01-20 | End: 2025-01-20

## 2025-01-20 RX ORDER — NICOTINE POLACRILEX 4 MG
15-30 LOZENGE BUCCAL
Status: DISCONTINUED | OUTPATIENT
Start: 2025-01-20 | End: 2025-01-22 | Stop reason: HOSPADM

## 2025-01-20 RX ORDER — CARBOXYMETHYLCELLULOSE SODIUM 5 MG/ML
1 SOLUTION/ DROPS OPHTHALMIC
Status: ON HOLD | COMMUNITY

## 2025-01-20 RX ORDER — VENLAFAXINE HYDROCHLORIDE 150 MG/1
150 TABLET, EXTENDED RELEASE ORAL DAILY
Status: DISCONTINUED | OUTPATIENT
Start: 2025-01-20 | End: 2025-01-20

## 2025-01-20 RX ORDER — METOPROLOL SUCCINATE 100 MG/1
100 TABLET, EXTENDED RELEASE ORAL 2 TIMES DAILY
Status: ON HOLD | COMMUNITY

## 2025-01-20 RX ORDER — CETIRIZINE HYDROCHLORIDE 10 MG/1
10 TABLET ORAL DAILY
Status: ON HOLD | COMMUNITY

## 2025-01-20 RX ORDER — ATORVASTATIN CALCIUM 40 MG/1
40 TABLET, FILM COATED ORAL EVERY EVENING
Status: ON HOLD | COMMUNITY

## 2025-01-20 RX ORDER — METHOCARBAMOL 500 MG/1
500 TABLET, FILM COATED ORAL 3 TIMES DAILY PRN
Status: ON HOLD | COMMUNITY

## 2025-01-20 RX ORDER — ATORVASTATIN CALCIUM 20 MG/1
20 TABLET, FILM COATED ORAL DAILY
Status: DISCONTINUED | OUTPATIENT
Start: 2025-01-20 | End: 2025-01-20 | Stop reason: DRUGHIGH

## 2025-01-20 RX ORDER — DEXTROSE MONOHYDRATE 25 G/50ML
25-50 INJECTION, SOLUTION INTRAVENOUS
Status: DISCONTINUED | OUTPATIENT
Start: 2025-01-20 | End: 2025-01-22 | Stop reason: HOSPADM

## 2025-01-20 RX ORDER — AMOXICILLIN 250 MG
2 CAPSULE ORAL 2 TIMES DAILY PRN
Status: DISCONTINUED | OUTPATIENT
Start: 2025-01-20 | End: 2025-01-22 | Stop reason: HOSPADM

## 2025-01-20 RX ORDER — METOPROLOL SUCCINATE 50 MG/1
50 TABLET, EXTENDED RELEASE ORAL DAILY
Status: DISCONTINUED | OUTPATIENT
Start: 2025-01-20 | End: 2025-01-20

## 2025-01-20 RX ORDER — LORAZEPAM 1 MG/1
1 TABLET ORAL EVERY 8 HOURS PRN
Status: DISCONTINUED | OUTPATIENT
Start: 2025-01-20 | End: 2025-01-20

## 2025-01-20 RX ORDER — BUPROPION HYDROCHLORIDE 150 MG/1
150 TABLET ORAL EVERY MORNING
Status: DISCONTINUED | OUTPATIENT
Start: 2025-01-21 | End: 2025-01-21

## 2025-01-20 RX ORDER — QUETIAPINE FUMARATE 50 MG/1
50 TABLET, FILM COATED ORAL 2 TIMES DAILY
Status: DISCONTINUED | OUTPATIENT
Start: 2025-01-20 | End: 2025-01-20 | Stop reason: DRUGHIGH

## 2025-01-20 RX ORDER — LAMOTRIGINE 25 MG/1
50 TABLET ORAL DAILY
Status: DISCONTINUED | OUTPATIENT
Start: 2025-01-21 | End: 2025-01-22 | Stop reason: HOSPADM

## 2025-01-20 RX ORDER — FOLIC ACID 1 MG/1
1 TABLET ORAL DAILY
Status: ON HOLD | COMMUNITY

## 2025-01-20 RX ORDER — AMOXICILLIN 250 MG
1 CAPSULE ORAL 2 TIMES DAILY PRN
Status: DISCONTINUED | OUTPATIENT
Start: 2025-01-20 | End: 2025-01-22 | Stop reason: HOSPADM

## 2025-01-20 RX ORDER — LISINOPRIL 5 MG/1
5 TABLET ORAL DAILY
Status: DISCONTINUED | OUTPATIENT
Start: 2025-01-21 | End: 2025-01-22 | Stop reason: HOSPADM

## 2025-01-20 RX ORDER — PROCHLORPERAZINE MALEATE 5 MG/1
5 TABLET ORAL EVERY 6 HOURS PRN
Status: DISCONTINUED | OUTPATIENT
Start: 2025-01-20 | End: 2025-01-22 | Stop reason: HOSPADM

## 2025-01-20 RX ORDER — ACETAMINOPHEN 500 MG
1000 TABLET ORAL 3 TIMES DAILY
Status: ON HOLD | COMMUNITY

## 2025-01-20 RX ORDER — GABAPENTIN 100 MG/1
300 CAPSULE ORAL 3 TIMES DAILY
Status: ON HOLD | COMMUNITY

## 2025-01-20 RX ORDER — FUROSEMIDE 20 MG/1
40 TABLET ORAL DAILY
Status: DISCONTINUED | OUTPATIENT
Start: 2025-01-20 | End: 2025-01-20

## 2025-01-20 RX ORDER — LANOLIN ALCOHOL/MO/W.PET/CERES
100 CREAM (GRAM) TOPICAL DAILY
Status: ON HOLD | COMMUNITY

## 2025-01-20 RX ORDER — ZOLPIDEM TARTRATE 5 MG/1
10 TABLET ORAL
Status: DISCONTINUED | OUTPATIENT
Start: 2025-01-20 | End: 2025-01-20

## 2025-01-20 RX ORDER — METOPROLOL SUCCINATE 25 MG/1
75 TABLET, EXTENDED RELEASE ORAL DAILY
Status: DISCONTINUED | OUTPATIENT
Start: 2025-01-20 | End: 2025-01-20

## 2025-01-20 RX ORDER — METFORMIN HYDROCHLORIDE 500 MG/1
1000 TABLET, EXTENDED RELEASE ORAL
Status: ON HOLD | COMMUNITY

## 2025-01-20 RX ORDER — QUETIAPINE FUMARATE 200 MG/1
400 TABLET, FILM COATED ORAL AT BEDTIME
Status: ON HOLD | COMMUNITY

## 2025-01-20 RX ORDER — SPIRONOLACTONE 25 MG
12.5 TABLET ORAL DAILY
Status: DISCONTINUED | OUTPATIENT
Start: 2025-01-20 | End: 2025-01-20

## 2025-01-20 RX ORDER — LAMOTRIGINE 25 MG/1
50 TABLET ORAL DAILY
Status: ON HOLD | COMMUNITY

## 2025-01-20 RX ORDER — BUPROPION HYDROCHLORIDE 300 MG/1
300 TABLET ORAL EVERY MORNING
Status: DISCONTINUED | OUTPATIENT
Start: 2025-01-21 | End: 2025-01-21

## 2025-01-20 RX ORDER — DULOXETIN HYDROCHLORIDE 60 MG/1
120 CAPSULE, DELAYED RELEASE ORAL DAILY
Status: DISCONTINUED | OUTPATIENT
Start: 2025-01-20 | End: 2025-01-22 | Stop reason: HOSPADM

## 2025-01-20 RX ORDER — GABAPENTIN 300 MG/1
300 CAPSULE ORAL 3 TIMES DAILY
Status: DISCONTINUED | OUTPATIENT
Start: 2025-01-20 | End: 2025-01-22 | Stop reason: HOSPADM

## 2025-01-20 RX ORDER — BUPROPION HYDROCHLORIDE 150 MG/1
150 TABLET ORAL EVERY MORNING
Status: ON HOLD | COMMUNITY

## 2025-01-20 RX ORDER — TOPIRAMATE 50 MG/1
100 TABLET, FILM COATED ORAL 2 TIMES DAILY
Status: ON HOLD | COMMUNITY

## 2025-01-20 RX ORDER — LOPERAMIDE HCL 1 MG/7.5ML
2 SOLUTION ORAL 4 TIMES DAILY PRN
Status: ON HOLD | COMMUNITY

## 2025-01-20 RX ORDER — FOLIC ACID 1 MG/1
1 TABLET ORAL DAILY
Status: DISCONTINUED | OUTPATIENT
Start: 2025-01-21 | End: 2025-01-22 | Stop reason: HOSPADM

## 2025-01-20 RX ORDER — ONDANSETRON 4 MG/1
4 TABLET, ORALLY DISINTEGRATING ORAL EVERY 6 HOURS PRN
Status: DISCONTINUED | OUTPATIENT
Start: 2025-01-20 | End: 2025-01-22 | Stop reason: HOSPADM

## 2025-01-20 RX ORDER — LISINOPRIL 5 MG/1
5 TABLET ORAL 2 TIMES DAILY
Status: DISCONTINUED | OUTPATIENT
Start: 2025-01-20 | End: 2025-01-20

## 2025-01-20 RX ORDER — CLOPIDOGREL BISULFATE 75 MG/1
75 TABLET ORAL DAILY
Status: DISCONTINUED | OUTPATIENT
Start: 2025-01-20 | End: 2025-01-20

## 2025-01-20 RX ORDER — ATORVASTATIN CALCIUM 40 MG/1
40 TABLET, FILM COATED ORAL EVERY EVENING
Status: DISCONTINUED | OUTPATIENT
Start: 2025-01-20 | End: 2025-01-22 | Stop reason: HOSPADM

## 2025-01-20 RX ORDER — ONDANSETRON 2 MG/ML
4 INJECTION INTRAMUSCULAR; INTRAVENOUS EVERY 6 HOURS PRN
Status: DISCONTINUED | OUTPATIENT
Start: 2025-01-20 | End: 2025-01-22 | Stop reason: HOSPADM

## 2025-01-20 RX ORDER — BUPROPION HYDROCHLORIDE 300 MG/1
300 TABLET ORAL EVERY MORNING
Status: ON HOLD | COMMUNITY

## 2025-01-20 RX ORDER — DULOXETIN HYDROCHLORIDE 60 MG/1
120 CAPSULE, DELAYED RELEASE ORAL DAILY
Status: ON HOLD | COMMUNITY

## 2025-01-20 RX ORDER — PANTOPRAZOLE SODIUM 40 MG/1
40 TABLET, DELAYED RELEASE ORAL
Status: DISCONTINUED | OUTPATIENT
Start: 2025-01-20 | End: 2025-01-20

## 2025-01-20 RX ORDER — ASPIRIN 81 MG/1
81 TABLET ORAL DAILY
Status: DISCONTINUED | OUTPATIENT
Start: 2025-01-20 | End: 2025-01-20

## 2025-01-20 RX ORDER — CLOTRIMAZOLE 1 %
CREAM (GRAM) TOPICAL DAILY PRN
Status: ON HOLD | COMMUNITY

## 2025-01-20 RX ORDER — TOPIRAMATE 100 MG/1
100 TABLET, FILM COATED ORAL 2 TIMES DAILY
Status: DISCONTINUED | OUTPATIENT
Start: 2025-01-20 | End: 2025-01-22 | Stop reason: HOSPADM

## 2025-01-20 RX ADMIN — METOPROLOL SUCCINATE 75 MG: 25 TABLET, EXTENDED RELEASE ORAL at 20:42

## 2025-01-20 RX ADMIN — GABAPENTIN 300 MG: 300 CAPSULE ORAL at 17:42

## 2025-01-20 RX ADMIN — QUETIAPINE FUMARATE 400 MG: 200 TABLET ORAL at 21:14

## 2025-01-20 RX ADMIN — METOPROLOL SUCCINATE 50 MG: 50 TABLET, EXTENDED RELEASE ORAL at 12:02

## 2025-01-20 RX ADMIN — ATORVASTATIN CALCIUM 40 MG: 40 TABLET, FILM COATED ORAL at 20:42

## 2025-01-20 RX ADMIN — APIXABAN 5 MG: 5 TABLET, FILM COATED ORAL at 08:36

## 2025-01-20 RX ADMIN — APIXABAN 5 MG: 5 TABLET, FILM COATED ORAL at 20:42

## 2025-01-20 RX ADMIN — GABAPENTIN 300 MG: 300 CAPSULE ORAL at 20:42

## 2025-01-20 RX ADMIN — DULOXETINE HYDROCHLORIDE 120 MG: 60 CAPSULE, DELAYED RELEASE ORAL at 17:42

## 2025-01-20 RX ADMIN — LISINOPRIL 5 MG: 5 TABLET ORAL at 12:02

## 2025-01-20 RX ADMIN — TOPIRAMATE 100 MG: 100 TABLET, FILM COATED ORAL at 20:44

## 2025-01-20 ASSESSMENT — ACTIVITIES OF DAILY LIVING (ADL)
ADLS_ACUITY_SCORE: 59
ADLS_ACUITY_SCORE: 60
ADLS_ACUITY_SCORE: 60
ADLS_ACUITY_SCORE: 59
ADLS_ACUITY_SCORE: 41
ADLS_ACUITY_SCORE: 59
ADLS_ACUITY_SCORE: 59
ADLS_ACUITY_SCORE: 41
ADLS_ACUITY_SCORE: 59
ADLS_ACUITY_SCORE: 41
ADLS_ACUITY_SCORE: 60
ADLS_ACUITY_SCORE: 41
ADLS_ACUITY_SCORE: 60
ADLS_ACUITY_SCORE: 59

## 2025-01-20 NOTE — PLAN OF CARE
Goal Outcome Evaluation:  -diagnostic tests and consults completed and resulted: Not met  -vital signs normal or at patient baseline: Met

## 2025-01-20 NOTE — H&P
"[facility]    History and Physical - Medicine Service, MEGAN TEAM        Date of Admission:  1/19/2025    Assessment & Plan   Obi Messer V is a 69 year old male admitted on 1/19/2025.  History of HFpEF, coronary artery disease, hypertension, chronic kidney disease, depression, dermatomyositis, bipolar disorder, atrial fibrillation & type 2 diabetes admitted for generalized weakness.    Weakness  ?Cognitive impairment  Describes an episode where he \"lost control of my body both upper and lower extremities\" and was unable to ambulate without any other associated symptoms or known inciting illness.  No loss of consciousness.  At baseline utilizes a walker.  Vitally stable.  Labs without leukocytosis, normal procal and mildly elevated CRP.  EKG and troponin reassuring against cardiac event.  CT head without acute process.  Chest x-ray noted to have possible density in the left lower lung with? pneumonitis, clinically does not appear like overt pneumonia.  He did have a hospitalization in April 2024  that began with weakness and failure to thrive, found to be hypevolemic and was diuresed.  -MRI brain given atrial fibrillation that is on anticoagulation  -TSH, CRP, CK added to labs  -if fevers, culture and start antibiotics  -PT/OT consulted    HFpEF  Hypertension  BNP elevated to 1300 (though higher in the past), bedside echo performed by emergency department physician demonstrated grossly diminished function, which would be a change from previous echocardiograms.   -TTE  -continue lisinopril 5mg  -holding home spironolactone and furosomide for now, may need IV diuretics but currently declining  -s/p 1 L of fluids followed by diuretics in the emergency department    Atrial fibrillation  -continue metoprolol 50mg daily  -continue apixaban 5mg BID    Type 2 diabetes  -A1c, hypoglycemia protocol, glucose checks, did not order correction scale    Coronary artery " disease  -continue statin  -holding aspirin, patient unsure if taking    Chronic kidney disease  No acute injury.    Bipolar disorder  Depression  -continue seroquel 50 mg twice daily      Will need med reconciliation, pharmacy consulted.  Currently holding home aspirin, furosemide, spironolactone, ativan, and ambien.      Diet:  heart healthy  DVT Prophylaxis: home apixaban  Reis Catheter: Not present  Fluids: none  Lines: None     Cardiac Monitoring: None  Code Status:  DNR/DNI - confirmed with patient    Clinically Significant Risk Factors Present on Admission                # Drug Induced Coagulation Defect: home medication list includes an anticoagulant medication  # Drug Induced Platelet Defect: home medication list includes an antiplatelet medication   # Hypertension: Noted on problem list       To be officially staffed in the morning.    Ro Nicolas MD  Medicine Service, [facility]  Securely message with [employer] (more info)  Text page via [facility] Paging/Directory   See signed in provider for up to date coverage information  ______________________________________________________________________    Chief Complaint   Weakness    History is obtained from the patient    History of Present Illness     Had an admission in April 204  for for weakness and failure to thrive at an outside hospital.  Workup was largely unremarkable, found to be hypervolemic and was diuresed.  From what I can tell since then he has been in a nursing facility, I believe [facility].    Presented from his facility for weakness.  Describes an episode where he became suddenly weak and unable to control his upper or lower extremities and was unable to bear weight.  No inciting illness or event.  Did not have any other associated symptoms such as fevers, chills, chest pain, shortness of breath, nausea vomiting or diarrhea.  Did not lose consciousness during this time.  Has  generally been feeling well.  Takes his medications, though is not able to describe specifics about which medications he takes.    Notes this is happened before in the past but not to this extent.  Since arrival to the emergency department he has been feeling better.  When asked about his speech (a bit difficult to understand) says that this is not normal for him and he feels that his mouth is dry.  As the conversation progresses improved.    In the emergency department he was normotensive and without elevated heart rate.  He received fluids followed by diuretics.  Labs were largely unremarkable, CT head without acute process, and chest x-ray without overt findings but described as possible left lower lobe pneumonitis.      Past Medical History    Past Medical History:   Diagnosis Date    Acute renal impairment     anxiety disorder     Managed by psychiatrist    Atrial flutter (H)     Bereavement, uncomplicated     Bipolar affective disorder (H)     Bursitis of shoulder     CAD (coronary artery disease) 2/22/2013    Cervical vertebral fusion     CHF (congestive heart failure) (H)     Closed fracture of proximal end of right tibia with routine healing     Coronary artery disease     Depression with anxiety     Depressive disorder, not elsewhere classified     Managed by psychiatrist    Dermatomyositis (H)     Dyslipidemia     Encounter for screening for malignant neoplasm of prostate     Esophagitis     ETOH abuse     no longer drinking    Fracture of proximal end of humerus     History of smoking     Hyperlipidemia     Hypertension     Ischemic cardiomyopathy 3/3/2013    Knee pain     Major depressive disorder     Medial meniscus tear     Morbid obesity (H)     NSTEMI (non-ST elevated myocardial infarction) (H) 3/3/2013    Osteoarthritis of knee 2014    Paroxysmal atrial fibrillation (H) 2011    Polyp of colon     Status post closed fracture of right tibia     Type 2 diabetes mellitus (H) 2011    Unspecified  essential hypertension        Past Surgical History   Past Surgical History:   Procedure Laterality Date    BACK SURGERY      CHOLECYSTECTOMY      CHOLECYSTECTOMY      ENT SURGERY      OTHER SURGICAL HISTORY  2016    LEFT TOTAL KNEE ARTHROPLASTY     OTHER SURGICAL HISTORY  2004    CERVICAL 5DIALTION AND FUSION    QUADRICEPS TENDON REPAIR Right 12/5/2017    Procedure: RIGHT KNEE QUADRICEPS TENDON REPAIR;  Surgeon: Sean Ponce DO;  Location: [facility] OR;  Service:     REPLACEMENT TOTAL KNEE Right     TONSILLECTOMY, ADENOIDECTOMY, MYRINGOTOMY, INSERT TUBE BILATERAL, COMBINED  AGE 11    Z NONSPECIFIC PROCEDURE      tonsillectomy    Z NONSPECIFIC PROCEDURE      cholecystectomy    Z NONSPECIFIC PROCEDURE  2004    C-spine fusion       Prior to Admission Medications   Prior to Admission Medications   Prescriptions Last Dose Informant Patient Reported? Taking?   Cholecalciferol (VITAMIN D3 PO)  Self Yes No   Sig: Take 1,000 Units by mouth daily.   Furosemide (LASIX) 20 MG tablet   No No   Sig: Take 2 tablets by mouth daily.   IBUPROFEN PO  Self Yes No   Sig: Take 800 mg by mouth as needed. Takes 4-200mg tabs as needed   LORazepam (ATIVAN) 1 MG tablet   No No   Sig: Take 1 tablet by mouth every 8 hours as needed.   QUEtiapine Fumarate (SEROQUEL PO)  Self Yes No   Sig: Take 50 mg by mouth 2 times daily. But patient reports only taking once daily.  Says he forgets second dose   ZOLPIDEM TARTRATE PO  Self Yes No   Sig: Take 10 mg by mouth nightly as needed. Found dose with pharmacy   aspirin EC 81 MG EC tablet   No No   Sig: Take 1 tablet by mouth daily.   atorvastatin (LIPITOR) 20 MG tablet   No No   Sig: Take 1 tablet by mouth daily.   clopidogrel (PLAVIX) 75 MG tablet   No No   Sig: Take 1 tablet by mouth daily.   lisinopril (PRINIVIL,ZESTRIL) 5 MG tablet   No No   Sig: Take 1 tablet by mouth 2 times daily.   metoprolol (TOPROL-XL) 50 MG 24 hr tablet   No No   Sig: Take 1.5 tablets (75 mg) by mouth  daily   pantoprazole (PROTONIX) 40 MG enteric coated tablet   No No   Sig: Take 1 tablet by mouth every morning (before breakfast).   spironolactone (ALDACTONE) 25 MG tablet   No No   Sig: Take 0.5 tablets by mouth daily.   venlafaxine (EFFEXOR-ER) 150 MG TB24  Self Yes No   Sig: Take 150 mg by mouth daily. Patient reports taking one daily but directions on bottle are twice daily (confirmed with pharmacy)   warfarin (COUMADIN) 5 MG tablet   Yes No   Sig: Take 1.5 tabs (7.5 mg) M,W,F & 1 tab (5 mg) all other days once/day      Facility-Administered Medications: None       Physical Exam   Vital Signs: Temp: 97.8  F (36.6  C) Temp src: Oral BP: 139/89 Pulse: 67   Resp: 16 SpO2: 97 % O2 Device: None (Room air)    Weight: 0 lbs 0 oz    Non-toxic-appearing  Pupils equal round and reactive to light  Regular rate, irregularly irregular rhythm  Breathing comfortably on room air  Abdomen soft and nontender  Lower extremity edema present  Slightly tired, although later in the night became more awake and interactive  Able to give what appears to be decent history, speech sounds slightly dysarthric?  Strength intact in the upper and lower extremities  Gait not assessed but upon checking in with nurse was able to ambulate about 15 feet with walker without issues      Medical Decision Making         Data     I have personally reviewed the following data over the past 24 hrs:    9.0  \   12.5 (L)   / 176     137 107 23.6 (H) /  125 (H)   4.3 20 (L) 0.99 \     ALT: 18 AST: 21 AP: 111 TBILI: 0.3   ALB: 3.9 TOT PROTEIN: 7.0 LIPASE: N/A     Trop: 17 BNP: 1,326 (H)     TSH: 0.61 T4: N/A A1C: N/A     Procal: 0.02 CRP: 15.20 (H) Lactic Acid: 1.8       INR:  1.26 (H) PTT:  N/A   D-dimer:  N/A Fibrinogen:  N/A       Imaging results reviewed over the past 24 hrs:   Recent Results (from the past 24 hours)   POC US ECHO LIMITED    Impression    Bedside, focused cardiac ultrasound performed and interpreted by me.    Indication: Eval global  function    Parasternal long, parasternal short, apical four-chamber views were acquired for gross evaluation of global cardiac function, Gross RV strain and pericardial effusion.  Image quality was satisfactory. Global left ventricular function appears grossly diminished.  The LV chamber appears larger than the RV chamber.  There is biatrial enlargement.  There is no evidence of free fluid within the pericardium.     Impression: Bedside limited cardiac ultrasound showing grossly diminished left ventricular function. No gross evidence for right ventricular strain.   There is no pericardial effusion.  Atrial enlargement      Bedside focused lung ultrasound performed and interpreted by me.     Indication: Eval for evidence of pulmonary edema     Patient position: semirecumbent    Windows: apical fields, lateral fields.     Findings:    Predominance  Impression: pulmonary edema          XR Chest 2 Views    Narrative    EXAM: XR CHEST 2 VIEWS  LOCATION: [facility]  DATE: 1/19/2025    INDICATION: Weakness.  COMPARISON: 6/4/2024.      Impression    IMPRESSION: Heart size and pulmonary vascularity within normal limits. Mild hazy increased density left lower lung with a mild or low-grade pneumonitis not excluded. Clinical correlation. Right lung is clear. Aortic calcification. Degenerative   postoperative changes in the spine. Otherwise unremarkable.   CT Head w/o Contrast    Narrative    EXAM: CT HEAD W/O CONTRAST  LOCATION: [facility]  DATE: 1/19/2025    INDICATION: AMS  COMPARISON: 12/18/2024  TECHNIQUE: Routine CT Head without IV contrast. Multiplanar reformats. Dose reduction techniques were used.    FINDINGS:  INTRACRANIAL CONTENTS: No intracranial hemorrhage, extraaxial collection, or mass effect.  No CT evidence of acute infarct. Mild to moderate presumed chronic small vessel ischemic changes. Mild to moderate generalized  volume loss. No hydrocephalus.     VISUALIZED ORBITS/SINUSES/MASTOIDS: No intraorbital abnormality. No paranasal sinus mucosal disease. No middle ear or mastoid effusion.    BONES/SOFT TISSUES: No acute abnormality.      Impression    IMPRESSION:  1.  No acute intracranial process.

## 2025-01-20 NOTE — PLAN OF CARE
Goal Outcome Evaluation:  -diagnostic tests and consults completed and resulted: Not met  -vital signs normal or at patient baseline: Not met

## 2025-01-20 NOTE — PROGRESS NOTES
[facility]                                                                                   OUTPATIENT OCCUPATIONAL THERAPY    PLAN OF TREATMENT FOR OUTPATIENT REHABILITATION   Patient's Last Name, First Name, Obi Rinaldi    YOB: 1955   Provider's Name   [facility]   Medical Record No.  [id number]     Onset Date: 01/19/25 Start of Care Date: 01/20/25     Medical Diagnosis:  generalized weakness               OT Diagnosis:  decreased engagement in ADLs. Certification Dates:  From: 01/20/25  To: 02/03/25     See note for plan of treatment, functional goals, and certification details.    I CERTIFY THE NEED FOR THESE SERVICES FURNISHED UNDER        THIS PLAN OF TREATMENT AND WHILE UNDER MY CARE (Physician co-signature of this document indicates review and certification of the therapy plan).              01/20/25 0842   Appointment Info   Signing Clinician's Name / Credentials (OT) AGA Mooney/L   Quick Adds   Quick Adds Certification   Living Environment   People in Home facility resident   Current Living Arrangements other (see comments)   Home Accessibility no concerns   Transportation Anticipated other (see comments)   Living Environment Comments Pt reports living in a SNF, reports this is a TCU but unclear as pt reports he has resided there since July. Declines stairs.   Self-Care   Usual Activity Tolerance fair   Current Activity Tolerance fair   Regular Exercise Other (see comments)  (reports completing PT a few times a week)   Equipment Currently Used at Home dressing device;walker, standard;shower chair;grab bar, toilet;grab bar, tub/shower   Fall history within last six months yes   Number of times patient has fallen within last six months 1   Activity/Exercise/Self-Care Comment Pt reports Mod I with 4WW at baseline. Reports fall yesterday where he got dizzy and his legs gave out. Reports could receive assist from  "facility staff if he asked.   Instrumental Activities of Daily Living (IADL)   IADL Comments Pt reports facility staff complete IADLs.   General Information   Onset of Illness/Injury or Date of Surgery 01/19/25   Referring Physician Ro Nicolas MD   Patient/Family Therapy Goal Statement (OT) to go back to facility   Additional Occupational Profile Info/Pertinent History of Current Problem Per chart: 69 year old male admitted on 1/19/2025.  History of HFpEF, coronary artery disease, hypertension, chronic kidney disease, depression, dermatomyositis, bipolar disorder, atrial fibrillation & type 2 diabetes admitted for generalized weakness.   Existing Precautions/Restrictions fall   Limitations/Impairments safety/cognitive   Left Upper Extremity (Weight-bearing Status) full weight-bearing (FWB)   Right Upper Extremity (Weight-bearing Status) full weight-bearing (FWB)   Left Lower Extremity (Weight-bearing Status) full weight-bearing (FWB)   Right Lower Extremity (Weight-bearing Status) full weight-bearing (FWB)   Cognitive Status Examination   Orientation Status person;place   Affect/Mental Status (Cognitive) confused   Follows Commands WFL   Cognitive Status Comments Pt pleasant and cooperatvie, reports date as July, unable to correctly state month when given context cues such as \"it's cold outside.\" Pt appears generally disheveled, reports minimal recall of events of last day. Observed pt to have urinated on the floor but when questioned states \"I must have put a deposit on the floor\" despite having urinals within reach.   Visual Perception   Visual Impairment/Limitations corrective lenses full-time   Sensory   Sensory Quick Adds sensation intact   Pain Assessment   Patient Currently in Pain Yes, see Vital Sign flowsheet  (reports ongoing pain since breaking R hip in July)   Posture   Posture forward head position;protracted shoulders   Range of Motion Comprehensive   General Range of Motion bilateral upper " extremity ROM WFL   Strength Comprehensive (MMT)   Comment, General Manual Muscle Testing (MMT) Assessment Demonstrates generalized deconditioning but WFL for ADLs.   Muscle Tone Assessment   Muscle Tone Quick Adds No deficits were identified   Coordination   Upper Extremity Coordination No deficits were identified   Bed Mobility   Bed Mobility No deficits identified   Transfers   Transfer Comments SBA with FWW   Balance   Balance Comments SBA with FWW   Activities of Daily Living   BADL Assessment/Intervention bathing;lower body dressing;toileting   Bathing Assessment/Intervention   [place] Level (Bathing) supervision   Comment, (Bathing) Per clinical reasoning   Lower Body Dressing Assessment/Training   Comment, (Lower Body Dressing) doff/don of socks   [place] Level (Lower Body Dressing) supervision   Toileting   Comment, (Toileting) Per clinical reasoning   [place] Level (Toileting) supervision   Clinical Impression   Criteria for Skilled Therapeutic Interventions Met (OT) Yes, treatment indicated   OT Diagnosis decreased engagement in ADLs.   OT Problem List-Impairments impacting ADL problems related to;activity tolerance impaired;balance;cognition;strength;mobility   Assessment of Occupational Performance 1-3 Performance Deficits   Identified Performance Deficits toileting, functional mobility, bathing   Planned Therapy Interventions (OT) ADL retraining;cognition;transfer training;strengthening;home program guidelines;progressive activity/exercise   Clinical Decision Making Complexity (OT) problem focused assessment/low complexity   Risk & Benefits of therapy have been explained evaluation/treatment results reviewed;care plan/treatment goals reviewed;risks/benefits reviewed;current/potential barriers reviewed;participants voiced agreement with care plan;participants included;patient   Clinical Impression Comments Pt may benefit from continued skilled IP OT services to progress IND and safety  with ADLs.   OT Total Evaluation Time   OT Eval, Low Complexity Minutes (85414) 10   Therapy Certification   Medical Diagnosis generalized weakness   Start of Care Date 01/20/25   Certification date from 01/20/25   Certification date to 02/03/25   OT Goals   Therapy Frequency (OT) 3 times/week   OT Predicted Duration/Target Date for Goal Attainment 02/03/25   OT Goals OT Goal 1;Cognition;Toilet Transfer/Toileting   OT: Toilet Transfer/Toileting Modified independent   OT: Cognitive Patient/caregiver will verbalize understanding of cognitive assessment results/recommendations as needed for safe discharge planning   OT: Goal 1 Pt will IND complete BUE HEP to progress activity tolerance for ADLs.   Interventions   Interventions Quick Adds Therapeutic Activity   Therapeutic Activities   Therapeutic Activity Minutes (42335) 8   Symptoms noted during/after treatment fatigue   Treatment Detail/Skilled Intervention Progressed functional activity tolerance needed for ADLs via functional mobility in hallway. Pt requires Mod VC for safety with FWW to bring closer to hips throughout. Completes ~100' SBA. Encouraged continued ambulation to prevent deconditioning with assist from nursing staff.   OT Discharge Planning   OT Plan toilet tx, BUE HEP, cog screen as indicated.   OT Discharge Recommendation (DC Rec) home with assist  (resumption of PT)   OT Rationale for DC Rec Pt appears near baseline, limited by decreased activity tolerance. Anticipate safe to return to previous living environement with previous level of IADL assist. Recommend continuation of physical therapy.   OT Brief overview of current status SBA with FWW   OT Total Distance Amb During Session (feet) 100   Total Session Time   Timed Code Treatment Minutes 8   Total Session Time (sum of timed and untimed services) 18

## 2025-01-20 NOTE — ED TRIAGE NOTES
BiBA from a nursing home due c/o left side weakness. BP 95/60. When EMS arrived /70, HR 78, . Negative for stroke . No slurred speech or other symptoms. A/O . Denied N/V/ No diarrhea or fever. Denied chest pain or SOB. Hx: fall, depression ,CHF.   /71 (BP Location: Right arm)   Pulse 74   Temp 97.8  F (36.6  C) (Oral)   Resp 18   SpO2 98%

## 2025-01-20 NOTE — PLAN OF CARE
Goal Outcome Evaluation:       diagnostic tests and consults completed and resulted: met  -vital signs normal or at patient baseline: met, a little hypertensive, bp meds scheduled for am. Per pt not sure if he got bp meds before he left facility

## 2025-01-20 NOTE — PHARMACY-ADMISSION MEDICATION HISTORY
Pharmacist Admission Medication History    Admission medication history is complete. The information provided in this note is only as accurate as the sources available at the time of the update.    Information Source(s): Patient, Facility (TCU/NH/) medication list/MAR, and CareEverywhere/SureScripts via in-person and primary source was from MAR faxed from [facility] in [facility] phone [phone].   All changes based on [facility] MAR unless stated otherwise below.  Surescripts information is spotty and only starts at the beginning of the calendar year 20205. Will leave a copy of the MAR in patient's chart on OBS 1A.     Pertinent Information: for PRNs not taken this month (per MAR) - marked as more than a month ago.   Note most of the facility ordered meds that were listed prior to this interview/med history were from 2013 and 2014.      Changes made to PTA medication list:  Added:    Bupropion XL   Cetirizine   Cyanocobalamin   Duloxetine  Folic acid  Lamotrigine   Metformin ER   Thiamine  Eliquis  Topiramate   Gabapentin   Acetaminophen   Refresh plus   Dextromethorphan- guaifenesin  Prostate X plus & Herbal Virility Max (nonformulary and hasn't taken yet this month per MAR)  Loperamide   Methocarbamol.   Clotrimazole PRN - patient applied not on MAR.   Deleted:   Vitamin D  Clopidogrel  Furosemide   Ibuprofen  Lorazepam   Pantoprazole  Spironolactone  Venlafaxine  Warfarin  Zolpidem  Changed:   Atorvastatin dose 20 mg --> 40 mg  Lisinopril 5 mg BID --> 5 mg daily   Quetiapine 50 mg BID --> 400 mg at bedtime   Metoprolol ER 75 mg daily --> 100 mg BID       Allergies reviewed with patient and updates made in EHR: yes    Medication History Completed By: Guevara Stanley [facility] 1/20/2025 12:27 PM    Prior to Admission medications    Medication Sig Last Dose Taking? Auth Provider Long Term End Date   acetaminophen (TYLENOL) 500 MG tablet Take 1,000 mg by mouth 3 times daily. 1/19/2025 Morning Yes Unknown, Entered By  History     apixaban ANTICOAGULANT (ELIQUIS) 5 MG tablet Take 5 mg by mouth 2 times daily. 1/19/2025 Evening Yes Unknown, Entered By History     aspirin EC 81 MG EC tablet Take 1 tablet by mouth daily. 1/19/2025 Morning Yes Kenisha Washburn MD     atorvastatin (LIPITOR) 40 MG tablet Take 40 mg by mouth every evening. 1/19/2025 Evening Yes Unknown, Entered By History     buPROPion (WELLBUTRIN XL) 150 MG 24 hr tablet Take 150 mg by mouth every morning. Take with the 300 mg tablet for total morning dose of 450 mg 1/19/2025 Yes Unknown, Entered By History     buPROPion (WELLBUTRIN XL) 300 MG 24 hr tablet Take 300 mg by mouth every morning. Take with the 150 mg tablet for total morning dose of 450 mg 1/19/2025 Morning Yes Unknown, Entered By History     carboxymethylcellulose PF (REFRESH PLUS) 0.5 % ophthalmic solution Place 1 drop into both eyes every hour as needed for dry eyes. More than a month Yes Unknown, Entered By History     cetirizine (ZYRTEC) 10 MG tablet Take 10 mg by mouth daily. 1/19/2025 Morning Yes Unknown, Entered By History     clotrimazole (LOTRIMIN) 1 % external cream Apply topically daily as needed (groin rash). Unknown Yes Unknown, Entered By History     Dextromethorphan-guaiFENesin  MG/20ML LIQD Take 10 mLs by mouth every 4 hours as needed (cough). More than a month Yes Unknown, Entered By History     diclofenac (VOLTAREN) 1 % topical gel Apply 2 g topically every 6 hours as needed for moderate pain (knees). More than a month Yes Unknown, Entered By History     DULoxetine (CYMBALTA) 60 MG capsule Take 120 mg by mouth daily. 1/19/2025 Morning Yes Unknown, Entered By History     folic acid (FOLVITE) 1 MG tablet Take 1 mg by mouth daily. 1/19/2025 Morning Yes Unknown, Entered By History     gabapentin (NEURONTIN) 100 MG capsule Take 300 mg by mouth 3 times daily. 1/19/2025 Evening Yes Unknown, Entered By History Yes    lamoTRIgine (LAMICTAL) 25 MG tablet Take 50 mg by mouth daily. 1/19/2025  Morning Yes Unknown, Entered By History Yes    lisinopril (PRINIVIL,ZESTRIL) 5 MG tablet Take 1 tablet by mouth 2 times daily.  Patient taking differently: Take 5 mg by mouth daily. 1/19/2025 Morning Yes An Almanzar MD Yes    loperamide (IMODIUM) 1 MG/7.5ML liquid Take 2 mg by mouth 4 times daily as needed for diarrhea. More than a month Yes Unknown, Entered By History     metFORMIN (GLUCOPHAGE XR) 500 MG 24 hr tablet Take 1,000 mg by mouth daily (with breakfast). 1/19/2025 Morning Yes Unknown, Entered By History     methocarbamol (ROBAXIN) 500 MG tablet Take 500 mg by mouth 3 times daily as needed for muscle spasms. More than a month Yes Unknown, Entered By History     metoprolol succinate ER (TOPROL XL) 100 MG 24 hr tablet Take 100 mg by mouth 2 times daily. 1/19/2025 Evening Yes Unknown, Entered By History No    NONFORMULARY PROSTATE X PLUS -  take 1 capsule every 24 hr as needed for prostate supplement. More than a month Yes Unknown, Entered By History     NONFORMULARY Herbal Virility Max - take 2 tablets every 24 hr as needed for dietary supplement. More than a month Yes Unknown, Entered By History     polyethylene glycol (MIRALAX) 17 GM/Dose powder Take 1 Capful by mouth daily as needed for constipation. Dissolved in 4-8 oz of beverage/drink More than a month Yes Unknown, Entered By History     QUEtiapine (SEROQUEL) 200 MG tablet Take 400 mg by mouth at bedtime. 1/19/2025 Evening Yes Unknown, Entered By History     thiamine (B-1) 100 MG tablet Take 100 mg by mouth daily. 1/19/2025 Morning Yes Unknown, Entered By History     topiramate (TOPAMAX) 50 MG tablet Take 100 mg by mouth 2 times daily. 1/19/2025 Evening Yes Unknown, Entered By History     vitamin B-12 (CYANOCOBALAMIN) 1000 MCG tablet Take 1,000 mcg by mouth daily. 1/19/2025 Morning Yes Unknown, Entered By History

## 2025-01-20 NOTE — ED NOTES
Bed: ED25  Expected date:   Expected time:   Means of arrival:   Comments:  [id number]  69m  Weakness  Snf pt  Alert and orientated   Stroke scale negative  100/60  yellow

## 2025-01-20 NOTE — PLAN OF CARE
Goal Outcome Evaluation:    -diagnostic tests and consults completed and resulted - not met  -vital signs normal or at patient baseline - met

## 2025-01-20 NOTE — ED PROVIDER NOTES
ED Provider Note  January 19, 2025  [facility]      History     Chief Complaint: Generalized Weakness      HPI  Obi Messer V is a 69 year old male  with a history of CHF, CAD, HTN, depression, dermatomyositis, bipolar disorder, atrial fibrillation, DM II presenting to the ED with generalized weakness.  At nursing home initially with some concern of weakness of the right side, however patient notes he is chronically has trouble in his right due to a bad right hip.    Per EMS had no focal weakness, weakness was generalized with no concern for stroke.    My first interview the patient is patient is very mumbled and muffled.  He is able to answer my questions but appears quite somnolent.  Able to follow commands and answers questions otherwise    Denies any chest pain or shortness of breath.  No abdominal pain vomiting or diarrhea.  No dysuria.  Denies any recent falls or trauma.    Notes only generalized weakness.        Past Medical History  Past Medical History:   Diagnosis Date    Acute renal impairment     anxiety disorder     Managed by psychiatrist    Atrial flutter (H)     Bereavement, uncomplicated     Bipolar affective disorder (H)     Bursitis of shoulder     CAD (coronary artery disease) 2/22/2013    Cervical vertebral fusion     CHF (congestive heart failure) (H)     Closed fracture of proximal end of right tibia with routine healing     Coronary artery disease     Depression with anxiety     Depressive disorder, not elsewhere classified     Managed by psychiatrist    Dermatomyositis (H)     Dyslipidemia     Encounter for screening for malignant neoplasm of prostate     Esophagitis     ETOH abuse     no longer drinking    Fracture of proximal end of humerus     History of smoking     Hyperlipidemia     Hypertension     Ischemic cardiomyopathy 3/3/2013    Knee pain     Major depressive disorder     Medial meniscus tear     Morbid obesity (H)     NSTEMI (non-ST elevated  myocardial infarction) (H) 3/3/2013    Osteoarthritis of knee     Paroxysmal atrial fibrillation (H)     Polyp of colon     Status post closed fracture of right tibia     Type 2 diabetes mellitus (H)     Unspecified essential hypertension      Past Surgical History:   Procedure Laterality Date    BACK SURGERY      CHOLECYSTECTOMY      CHOLECYSTECTOMY      ENT SURGERY      OTHER SURGICAL HISTORY  2016    LEFT TOTAL KNEE ARTHROPLASTY     OTHER SURGICAL HISTORY  2004    CERVICAL 5DIALTION AND FUSION    QUADRICEPS TENDON REPAIR Right 2017    Procedure: RIGHT KNEE QUADRICEPS TENDON REPAIR;  Surgeon: Sean Ponce DO;  Location: [facility];  Service:     REPLACEMENT TOTAL KNEE Right     TONSILLECTOMY, ADENOIDECTOMY, MYRINGOTOMY, INSERT TUBE BILATERAL, COMBINED  AGE 11    [facility] NONSPECIFIC PROCEDURE      tonsillectomy    [facility] NONSPECIFIC PROCEDURE      cholecystectomy    [facility] NONSPECIFIC PROCEDURE      C-spine fusion     aspirin EC 81 MG EC tablet  atorvastatin (LIPITOR) 20 MG tablet  Cholecalciferol (VITAMIN D3 PO)  clopidogrel (PLAVIX) 75 MG tablet  Furosemide (LASIX) 20 MG tablet  IBUPROFEN PO  lisinopril (PRINIVIL,ZESTRIL) 5 MG tablet  LORazepam (ATIVAN) 1 MG tablet  metoprolol (TOPROL-XL) 50 MG 24 hr tablet  pantoprazole (PROTONIX) 40 MG enteric coated tablet  QUEtiapine Fumarate (SEROQUEL PO)  spironolactone (ALDACTONE) 25 MG tablet  venlafaxine (EFFEXOR-ER) 150 MG TB24  warfarin (COUMADIN) 5 MG tablet  ZOLPIDEM TARTRATE PO      Allergies   Allergen Reactions    No Known Drug Allergy      Family History  Family History   Problem Relation Age of Onset    C.A.D. Father          at age 63; had suffered mult heart attacks    Gastrointestinal Disease Mother          at age 68; some sort of liver problem    Heart Disease Father      Social History   Social History     Tobacco Use    Smoking status: Former     Types: Cigars     Quit date: 2014     Years since quittin.0     Smokeless tobacco: Current    Tobacco comments:     3-5 cigars a week   Substance Use Topics    Alcohol use: Yes     Comment: Alcoholic Drinks/day: 2/week    Drug use: No        Past medical history, past surgical history, medications, allergies, family history, and social history were reviewed with the patient. No additional pertinent items.      A medically appropriate review of systems was performed with pertinent positives and negatives noted in the HPI, and all other systems negative.    Physical Exam   /89   Pulse 67   Temp 97.8  F (36.6  C) (Oral)   Resp 16   SpO2 97%     GEN: Tired and somnolent appearing, non toxic, cooperative and conversant.   HEENT: The head is normocephalic and atraumatic. Pupils are equal round and reactive to light. Extraocular motions are intact. There is no facial swelling. The neck is nontender and supple.   CV: Regular rate and rhythm. 2+ radial pulses bilaterally.  PULM: Clear to auscultation bilaterally, basilar crackles.  ABD: Soft, nontender, nondistended.   EXT: Full range of motion.  Chronic bilateral lower extremity edema below the knees.  NEURO: Cranial nerves II through XII are intact and symmetric. Bilateral upper and lower extremities grossly show full range of motion without any focal deficits. Median, ulnar, radial nerve strength examined at the hand 5/5 and symmetric, SILT.   EHL, FHL, TA 5/5 and symmetric, SILT.   FNF I/S   PSYCH: Calm and cooperative, interactive.       ED Course, Procedures, & Data      Procedures  Results for orders placed during the hospital encounter of 01/19/25    POC US ECHO LIMITED    Impression  Bedside, focused cardiac ultrasound performed and interpreted by me.    Indication: Eval global function    Parasternal long, parasternal short, apical four-chamber views were acquired for gross evaluation of global cardiac function, Gross RV strain and pericardial effusion.  Image quality was satisfactory. Global left ventricular function  appears grossly diminished.  The LV chamber appears larger than the RV chamber.  There is biatrial enlargement.  There is no evidence of free fluid within the pericardium.    Impression: Bedside limited cardiac ultrasound showing grossly diminished left ventricular function. No gross evidence for right ventricular strain.   There is no pericardial effusion.  Atrial enlargement      Bedside focused lung ultrasound performed and interpreted by me.    Indication: Eval for evidence of pulmonary edema    Patient position: semirecumbent  Windows: apical fields, lateral fields.    Findings:    Predominance  Impression: pulmonary edema             EKG at 20:58.    Generalized weakness at time of ECG.  ECG interpretted by me within 10 minutes of production  Atrial fibrillation at 80 bpm. Normal axis.  Normal intervals. Nl R-wave progress. Nonspecific  ST abnormality. Pathologic T-wave inversion are absent     Interpretation: QTC prolonged, otherwise unchanged from 12/5/2017         Results for orders placed or performed during the hospital encounter of 01/19/25   CT Head w/o Contrast     Status: None    Narrative    EXAM: CT HEAD W/O CONTRAST  LOCATION: [facility]  DATE: 1/19/2025    INDICATION: AMS  COMPARISON: 12/18/2024  TECHNIQUE: Routine CT Head without IV contrast. Multiplanar reformats. Dose reduction techniques were used.    FINDINGS:  INTRACRANIAL CONTENTS: No intracranial hemorrhage, extraaxial collection, or mass effect.  No CT evidence of acute infarct. Mild to moderate presumed chronic small vessel ischemic changes. Mild to moderate generalized volume loss. No hydrocephalus.     VISUALIZED ORBITS/SINUSES/MASTOIDS: No intraorbital abnormality. No paranasal sinus mucosal disease. No middle ear or mastoid effusion.    BONES/SOFT TISSUES: No acute abnormality.      Impression    IMPRESSION:  1.  No acute intracranial process.     XR Chest 2 Views     Status: None     Narrative    EXAM: XR CHEST 2 VIEWS  LOCATION: [facility]  DATE: 1/19/2025    INDICATION: Weakness.  COMPARISON: 6/4/2024.      Impression    IMPRESSION: Heart size and pulmonary vascularity within normal limits. Mild hazy increased density left lower lung with a mild or low-grade pneumonitis not excluded. Clinical correlation. Right lung is clear. Aortic calcification. Degenerative   postoperative changes in the spine. Otherwise unremarkable.   POC US ECHO LIMITED     Status: None    Impression    Bedside, focused cardiac ultrasound performed and interpreted by me.    Indication: Eval global function    Parasternal long, parasternal short, apical four-chamber views were acquired for gross evaluation of global cardiac function, Gross RV strain and pericardial effusion.  Image quality was satisfactory. Global left ventricular function appears grossly diminished.  The LV chamber appears larger than the RV chamber.  There is biatrial enlargement.  There is no evidence of free fluid within the pericardium.     Impression: Bedside limited cardiac ultrasound showing grossly diminished left ventricular function. No gross evidence for right ventricular strain.   There is no pericardial effusion.  Atrial enlargement      Bedside focused lung ultrasound performed and interpreted by me.     Indication: Eval for evidence of pulmonary edema     Patient position: semirecumbent    Windows: apical fields, lateral fields.     Findings:    Predominance  Impression: pulmonary edema          Comprehensive metabolic panel     Status: Abnormal   Result Value Ref Range    Sodium 137 135 - 145 mmol/L    Potassium 4.3 3.4 - 5.3 mmol/L    Carbon Dioxide (CO2) 20 (L) 22 - 29 mmol/L    Anion Gap 10 7 - 15 mmol/L    Urea Nitrogen 23.6 (H) 8.0 - 23.0 mg/dL    Creatinine 0.99 0.67 - 1.17 mg/dL    GFR Estimate 82 >60 mL/min/1.73m2    Calcium 8.9 8.8 - 10.4 mg/dL    Chloride 107 98 - 107 mmol/L     Glucose 125 (H) 70 - 99 mg/dL    Alkaline Phosphatase 111 40 - 150 U/L    AST 21 0 - 45 U/L    ALT 18 0 - 70 U/L    Protein Total 7.0 6.4 - 8.3 g/dL    Albumin 3.9 3.5 - 5.2 g/dL    Bilirubin Total 0.3 <=1.2 mg/dL   Lactic acid whole blood with 1x repeat in 2 hr when >2     Status: Normal   Result Value Ref Range    Lactic Acid, Initial 1.8 0.7 - 2.0 mmol/L   UA with Microscopic reflex to Culture     Status: Abnormal    Specimen: Urine, Clean Catch   Result Value Ref Range    Color Urine Straw Colorless, Straw, Light Yellow, Yellow    Appearance Urine Clear Clear    Glucose Urine Negative Negative mg/dL    Bilirubin Urine Negative Negative    Ketones Urine Negative Negative mg/dL    Specific Gravity Urine 1.007 1.003 - 1.035    Blood Urine Negative Negative    pH Urine 6.5 5.0 - 7.0    Protein Albumin Urine Negative Negative mg/dL    Urobilinogen Urine Normal Normal, 2.0 mg/dL    Nitrite Urine Negative Negative    Leukocyte Esterase Urine Negative Negative    Bacteria Urine Few (A) None Seen /HPF    RBC Urine 1 <=2 /HPF    WBC Urine 2 <=5 /HPF    Narrative    Urine Culture not indicated   CBC with platelets and differential     Status: Abnormal   Result Value Ref Range    WBC Count 9.0 4.0 - 11.0 10e3/uL    RBC Count 4.41 4.40 - 5.90 10e6/uL    Hemoglobin 12.5 (L) 13.3 - 17.7 g/dL    Hematocrit 39.8 (L) 40.0 - 53.0 %    MCV 90 78 - 100 fL    MCH 28.3 26.5 - 33.0 pg    MCHC 31.4 (L) 31.5 - 36.5 g/dL    RDW 16.3 (H) 10.0 - 15.0 %    Platelet Count 176 150 - 450 10e3/uL    % Neutrophils 66 %    % Lymphocytes 25 %    % Monocytes 7 %    % Eosinophils 2 %    % Basophils 0 %    % Immature Granulocytes 0 %    NRBCs per 100 WBC 0 <1 /100    Absolute Neutrophils 5.9 1.6 - 8.3 10e3/uL    Absolute Lymphocytes 2.3 0.8 - 5.3 10e3/uL    Absolute Monocytes 0.6 0.0 - 1.3 10e3/uL    Absolute Eosinophils 0.2 0.0 - 0.7 10e3/uL    Absolute Basophils 0.0 0.0 - 0.2 10e3/uL    Absolute Immature Granulocytes 0.0 <=0.4 10e3/uL    Absolute  NRBCs 0.0 10e3/uL   [facility] Draw     Status: None    Narrative    The following orders were created for panel order [facility] Draw.  Procedure                               Abnormality         Status                     ---------                               -----------         ------                     Extra Blue Top Tube[[id number]]                              Final result                 Please view results for these tests on the individual orders.   Extra Blue Top Tube     Status: None   Result Value Ref Range    Hold Specimen JIC    Troponin T, High Sensitivity     Status: Normal   Result Value Ref Range    Troponin T, High Sensitivity 17 <=22 ng/L   Nt probnp inpatient     Status: Abnormal   Result Value Ref Range    N terminal Pro BNP Inpatient 1,326 (H) 0 - 900 pg/mL   Troponin T, High Sensitivity     Status: Normal   Result Value Ref Range    Troponin T, High Sensitivity 17 <=22 ng/L   EKG 12 lead     Status: None   Result Value Ref Range    Systolic Blood Pressure  mmHg    Diastolic Blood Pressure  mmHg    Ventricular Rate 80 BPM    Atrial Rate  BPM    NH Interval  ms    QRS Duration 114 ms     ms    QTc 495 ms    P Axis  degrees    R AXIS 78 degrees    T Axis 66 degrees    Interpretation ECG       Atrial fibrillation  Nonspecific ST abnormality  QTcB >= 480 msec  Abnormal ECG    Unconfirmed report - interpretation of this ECG is computer generated - see medical record for final interpretation  Confirmed by - EMERGENCY ROOM, PHYSICIAN ([id number]),  Kaylie Malone ([id number]) on 1/19/2025 10:46:29 PM     CBC with platelets differential     Status: Abnormal    Narrative    The following orders were created for panel order CBC with platelets differential.  Procedure                               Abnormality         Status                     ---------                               -----------         ------                     CBC with platelets and d...[[id number]]  Abnormal            Final result                  Please view results for these tests on the individual orders.     Medications   sodium chloride 0.9% BOLUS 1,000 mL (0 mLs Intravenous Stopped 1/20/25 0007)   furosemide (LASIX) injection 40 mg (40 mg Intravenous Not Given 1/19/25 2080)     Labs Ordered and Resulted from Time of ED Arrival to Time of ED Departure   COMPREHENSIVE METABOLIC PANEL - Abnormal       Result Value    Sodium 137      Potassium 4.3      Carbon Dioxide (CO2) 20 (*)     Anion Gap 10      Urea Nitrogen 23.6 (*)     Creatinine 0.99      GFR Estimate 82      Calcium 8.9      Chloride 107      Glucose 125 (*)     Alkaline Phosphatase 111      AST 21      ALT 18      Protein Total 7.0      Albumin 3.9      Bilirubin Total 0.3     ROUTINE UA WITH MICROSCOPIC REFLEX TO CULTURE - Abnormal    Color Urine Straw      Appearance Urine Clear      Glucose Urine Negative      Bilirubin Urine Negative      Ketones Urine Negative      Specific Gravity Urine 1.007      Blood Urine Negative      pH Urine 6.5      Protein Albumin Urine Negative      Urobilinogen Urine Normal      Nitrite Urine Negative      Leukocyte Esterase Urine Negative      Bacteria Urine Few (*)     RBC Urine 1      WBC Urine 2     CBC WITH PLATELETS AND DIFFERENTIAL - Abnormal    WBC Count 9.0      RBC Count 4.41      Hemoglobin 12.5 (*)     Hematocrit 39.8 (*)     MCV 90      MCH 28.3      MCHC 31.4 (*)     RDW 16.3 (*)     Platelet Count 176      % Neutrophils 66      % Lymphocytes 25      % Monocytes 7      % Eosinophils 2      % Basophils 0      % Immature Granulocytes 0      NRBCs per 100 WBC 0      Absolute Neutrophils 5.9      Absolute Lymphocytes 2.3      Absolute Monocytes 0.6      Absolute Eosinophils 0.2      Absolute Basophils 0.0      Absolute Immature Granulocytes 0.0      Absolute NRBCs 0.0     NT PROBNP INPATIENT - Abnormal    N terminal Pro BNP Inpatient 1,326 (*)    LACTIC ACID WHOLE BLOOD WITH 1X REPEAT IN 2 HR WHEN >2 - Normal    Lactic Acid, Initial 1.8      TROPONIN T, HIGH SENSITIVITY - Normal    Troponin T, High Sensitivity 17     TROPONIN T, HIGH SENSITIVITY - Normal    Troponin T, High Sensitivity 17     INR   PARTIAL THROMBOPLASTIN TIME   CK TOTAL   PROCALCITONIN   CRP INFLAMMATION     CT Head w/o Contrast   Final Result   IMPRESSION:   1.  No acute intracranial process.         XR Chest 2 Views   Final Result   IMPRESSION: Heart size and pulmonary vascularity within normal limits. Mild hazy increased density left lower lung with a mild or low-grade pneumonitis not excluded. Clinical correlation. Right lung is clear. Aortic calcification. Degenerative    postoperative changes in the spine. Otherwise unremarkable.      POC US ECHO LIMITED   Final Result   Bedside, focused cardiac ultrasound performed and interpreted by me.      Indication: Eval global function      Parasternal long, parasternal short, apical four-chamber views were acquired for gross evaluation of global cardiac function, Gross RV strain and pericardial effusion.  Image quality was satisfactory. Global left ventricular function appears grossly diminished.  The LV chamber appears larger than the RV chamber.  There is biatrial enlargement.  There is no evidence of free fluid within the pericardium.       Impression: Bedside limited cardiac ultrasound showing grossly diminished left ventricular function. No gross evidence for right ventricular strain.   There is no pericardial effusion.  Atrial enlargement         Bedside focused lung ultrasound performed and interpreted by me.       Indication: Eval for evidence of pulmonary edema       Patient position: semirecumbent     Windows: apical fields, lateral fields.       Findings:      Predominance   Impression: pulmonary edema                         Medical Decision Making Matrix    Problems Addressed   MDM High: 1 acute or chronic illness or injury that poses a threat to life or bodily function     Data   Considered   Data Extensive: Order of (or  considering) each unique test (Cat 1), Review of the results of each unique test (Cat 1), and Independent interpretation of a test performed by another practitioner (Cat 2)     Risk of Patient Management                  Assessment & Plan    69-year-old male with medical history as noted including CHF, dermatomyositis, depression, A-fib DM 2 among other medical problems who presents with general weakness as described    DDx is broad including ACS, CHF exacerbation, ketoacidosis, dehydration, electrolyte abnormalities, sepsis including urinary or pulmonary sources, among other causes    Clinically patient quite somnolent initially.  On serial evaluations continues to improve however, and ultimately at approximately 1230 appears to be speaking quite well and alert.  Noticed that zolpidem was on his list however he notes this was no longer given to him for at least 1 month.    During x-ray evaluation had quite a lot of difficulty with transfers due to generalized weakness.  Do not believe he would be safe for discharge.  Workup however overall not quite revealing.  Bedside cardiac ultrasound with diminished global function likely at baseline and B-lines as suspected related to mild CHF.  His chest x-ray is noted as questionable mild infiltrate but history not suggestive of symptoms explained by pneumonia.  Procalcitonin ordered to further help differentiate.    Laboratories notable for BNP of 1300, troponin negative x 2, labs otherwise unrevealing.  Due to concern for weakness and anticoagulation CT head was also acquired which shows no acute findings    Ultimately etiology of weakness is not certain.  Discussed with internal medicine admitted for further evaluation and management.    I have reviewed the nursing notes. I have reviewed the findings, diagnosis, plan and need for follow up with the patient.    New Prescriptions    No medications on file       Final diagnoses:   Weakness generalized       Shane Lawson,  MD  [facility] EMERGENCY DEPARTMENT  January 19, 2025      Shane Lawson MD  01/20/25 0053

## 2025-01-20 NOTE — PLAN OF CARE
Physical Therapy: Orders received. Chart reviewed and discussed with care team.? Physical Therapy not indicated due to patient near recent baseline, supervision with 2WW, admitted from facility (vs TCU per pt report), will have staff assist 24/7 per report. OT will continue to follow for mobility and cognition, anticipate safe to return to facility with resumption of PT/OT.? Defer discharge recommendations to OT.? Will complete orders.

## 2025-01-20 NOTE — PROGRESS NOTES
"Brief Internal Medicine Progress Note:    This patient was seen by my colleague earlier on this calendar day. This is a non-billable note.     Updates Today:  - weakness improved, the feeling of \"lost control\" of body resolved   - MRI brain obtained -> no acute intracranial pathology; moderate generalized volume loss and chronic small vessel ischemic disease   - echo with mildly reduced LV function, EF 45-50%, inferior wall akinesis, RV function normal, no pericardial effusion   - consider cardiology referral vs inpatient consult for reduced EF   - OT discharge rec: home with assist (resumption of PT)  - social work/RNCC consult to assist with dispo planning   - says that he currently resides at a TCU and works with PT/OT on a regular basis since his fall and subsequent hip fracture last summer     Weakness   Hx of left hip fracture s/p fall   Describes an episode where he \"lost control of my body both upper and lower extremities\" and was unable to ambulate without any other associated symptoms or known inciting illness.  No loss of consciousness.  At baseline utilizes a walker.  Vitally stable.  Labs without leukocytosis, normal procal and mildly elevated CRP.  EKG and troponin reassuring against cardiac event.  CT head without acute process.  Chest x-ray noted to have possible density in the left lower lung with? pneumonitis, clinically does not appear like overt pneumonia. COVID, flu A and B, and RSV negative. Initially appeared hypervolemic and was diuresed. He did have a hospitalization in April 2024 that began with weakness and failure to thrive. TSH, CK, troponin, and procal WNL. CRP elevated at 15.20 however unremarkable workup. MRI brain 1/20 with no acute intracranial pathology; moderate generalized volume loss and chronic small vessel ischemic disease. Echo 1/20 with mildly reduced LV function, EF 45-50%, inferior wall akinesis, RV function normal, no pericardial effusion.   - PT/OT consulted, appreciate " assistance   - if fevers, would culture and start antibiotics     HTN  Hx of ischemic cardiomyopathy   HFrEF   Hx CAD s/p PCI with LACHO to LAD and LCX  BNP elevated to 1300 (though higher in the past), bedside echo performed by emergency department physician demonstrated grossly diminished function. S/p 1L fluid followed by diuresis in the ED. Previous echo in April 2024 at OSH with EF 55-60%, normal LV function, normal RV function. Repeat echo on 1/20 with EF 45-50%, mildly reduced LV function, inferior wall akinesis, RV function normal, no pericardial effusion. Previously on lasix and spironolactone however does not take anymore.   - continue PTA atorvastatin 40 mg daily   - continue PTA lisinopril 5 mg daily  - decrease PTA metoprolol 100 mg BID to 75 mg BID d/t pressure on admission   - continue PTA eliquis 5 mg BID     Atrial fibrillation  - decrease PTA metoprolol 100 mg BID to 75 mg BID d/t pressure on admission   - continue PTA eliquis 5 mg BID     Type 2 DM  HgbA1c 6.2% on 1/19/2025.   - hold PTA metformin ER 1000 mg daily  while in the hospital  - POCT glucose checks   - no sliding scale insulin at this time     CKD stage 3  Creatinine 0.99 on admission, appears stable per chart review.   - continue to monitor; renally dose medications; avoid nephrotoxic meds when able       Bipolar disorder  Depression  - continue PTA seroquel 400 mg at bedtime, duloxetine 120 mg daily, bupropion  mg daily, and lamotrigine 50 mg daily     Tatum Maurice DNP, ACNPC-AG  Internal Medicine [facility]

## 2025-01-21 ENCOUNTER — APPOINTMENT (OUTPATIENT)
Dept: PHYSICAL THERAPY | Facility: CLINIC | Age: 70
End: 2025-01-21
Payer: COMMERCIAL

## 2025-01-21 LAB
ANION GAP SERPL CALCULATED.3IONS-SCNC: 8 MMOL/L (ref 7–15)
BUN SERPL-MCNC: 23.1 MG/DL (ref 8–23)
CALCIUM SERPL-MCNC: 9.2 MG/DL (ref 8.8–10.4)
CHLORIDE SERPL-SCNC: 109 MMOL/L (ref 98–107)
CREAT SERPL-MCNC: 1.04 MG/DL (ref 0.67–1.17)
EGFRCR SERPLBLD CKD-EPI 2021: 77 ML/MIN/1.73M2
ERYTHROCYTE [DISTWIDTH] IN BLOOD BY AUTOMATED COUNT: 16.4 % (ref 10–15)
GLUCOSE BLDC GLUCOMTR-MCNC: 88 MG/DL (ref 70–99)
GLUCOSE SERPL-MCNC: 100 MG/DL (ref 70–99)
HCO3 SERPL-SCNC: 22 MMOL/L (ref 22–29)
HCT VFR BLD AUTO: 44.3 % (ref 40–53)
HGB BLD-MCNC: 14.5 G/DL (ref 13.3–17.7)
INR PPP: 1.04 (ref 0.85–1.15)
MCH RBC QN AUTO: 28.8 PG (ref 26.5–33)
MCHC RBC AUTO-ENTMCNC: 32.7 G/DL (ref 31.5–36.5)
MCV RBC AUTO: 88 FL (ref 78–100)
PLATELET # BLD AUTO: 192 10E3/UL (ref 150–450)
POTASSIUM SERPL-SCNC: 4.4 MMOL/L (ref 3.4–5.3)
RBC # BLD AUTO: 5.03 10E6/UL (ref 4.4–5.9)
SODIUM SERPL-SCNC: 139 MMOL/L (ref 135–145)
WBC # BLD AUTO: 10.4 10E3/UL (ref 4–11)

## 2025-01-21 PROCEDURE — 250N000013 HC RX MED GY IP 250 OP 250 PS 637

## 2025-01-21 PROCEDURE — 97161 PT EVAL LOW COMPLEX 20 MIN: CPT | Mod: GP

## 2025-01-21 PROCEDURE — G0378 HOSPITAL OBSERVATION PER HR: HCPCS

## 2025-01-21 PROCEDURE — 80048 BASIC METABOLIC PNL TOTAL CA: CPT

## 2025-01-21 PROCEDURE — 85610 PROTHROMBIN TIME: CPT

## 2025-01-21 PROCEDURE — 97530 THERAPEUTIC ACTIVITIES: CPT | Mod: GP

## 2025-01-21 PROCEDURE — 36415 COLL VENOUS BLD VENIPUNCTURE: CPT

## 2025-01-21 PROCEDURE — 99233 SBSQ HOSP IP/OBS HIGH 50: CPT | Mod: FS

## 2025-01-21 PROCEDURE — 250N000013 HC RX MED GY IP 250 OP 250 PS 637: Performed by: HOSPITALIST

## 2025-01-21 PROCEDURE — 99207 PR APP CREDIT; MD BILLING SHARED VISIT: CPT | Mod: FS | Performed by: STUDENT IN AN ORGANIZED HEALTH CARE EDUCATION/TRAINING PROGRAM

## 2025-01-21 PROCEDURE — 82962 GLUCOSE BLOOD TEST: CPT

## 2025-01-21 PROCEDURE — 97116 GAIT TRAINING THERAPY: CPT | Mod: GP

## 2025-01-21 PROCEDURE — 85027 COMPLETE CBC AUTOMATED: CPT

## 2025-01-21 PROCEDURE — 250N000013 HC RX MED GY IP 250 OP 250 PS 637: Performed by: STUDENT IN AN ORGANIZED HEALTH CARE EDUCATION/TRAINING PROGRAM

## 2025-01-21 RX ORDER — METOPROLOL SUCCINATE 50 MG/1
100 TABLET, EXTENDED RELEASE ORAL 2 TIMES DAILY
Status: DISCONTINUED | OUTPATIENT
Start: 2025-01-21 | End: 2025-01-22 | Stop reason: HOSPADM

## 2025-01-21 RX ADMIN — APIXABAN 5 MG: 5 TABLET, FILM COATED ORAL at 10:06

## 2025-01-21 RX ADMIN — SENNOSIDES AND DOCUSATE SODIUM 1 TABLET: 50; 8.6 TABLET ORAL at 22:22

## 2025-01-21 RX ADMIN — GABAPENTIN 300 MG: 300 CAPSULE ORAL at 10:03

## 2025-01-21 RX ADMIN — APIXABAN 5 MG: 5 TABLET, FILM COATED ORAL at 20:06

## 2025-01-21 RX ADMIN — ATORVASTATIN CALCIUM 40 MG: 40 TABLET, FILM COATED ORAL at 20:06

## 2025-01-21 RX ADMIN — QUETIAPINE FUMARATE 400 MG: 200 TABLET ORAL at 22:22

## 2025-01-21 RX ADMIN — GABAPENTIN 300 MG: 300 CAPSULE ORAL at 20:06

## 2025-01-21 RX ADMIN — LAMOTRIGINE 50 MG: 25 TABLET ORAL at 15:15

## 2025-01-21 RX ADMIN — LISINOPRIL 5 MG: 5 TABLET ORAL at 10:04

## 2025-01-21 RX ADMIN — TOPIRAMATE 100 MG: 100 TABLET, FILM COATED ORAL at 20:10

## 2025-01-21 RX ADMIN — TOPIRAMATE 100 MG: 100 TABLET, FILM COATED ORAL at 10:03

## 2025-01-21 RX ADMIN — METOPROLOL SUCCINATE 100 MG: 50 TABLET, EXTENDED RELEASE ORAL at 20:06

## 2025-01-21 RX ADMIN — BUPROPION HYDROCHLORIDE 450 MG: 300 TABLET, EXTENDED RELEASE ORAL at 10:02

## 2025-01-21 RX ADMIN — SENNOSIDES AND DOCUSATE SODIUM 1 TABLET: 50; 8.6 TABLET ORAL at 10:08

## 2025-01-21 RX ADMIN — DULOXETINE HYDROCHLORIDE 120 MG: 60 CAPSULE, DELAYED RELEASE ORAL at 10:03

## 2025-01-21 RX ADMIN — METOPROLOL SUCCINATE 75 MG: 25 TABLET, EXTENDED RELEASE ORAL at 10:02

## 2025-01-21 RX ADMIN — FOLIC ACID 1 MG: 1 TABLET ORAL at 10:02

## 2025-01-21 RX ADMIN — GABAPENTIN 300 MG: 300 CAPSULE ORAL at 15:15

## 2025-01-21 ASSESSMENT — ACTIVITIES OF DAILY LIVING (ADL)
ADLS_ACUITY_SCORE: 52
ADLS_ACUITY_SCORE: 59
ADLS_ACUITY_SCORE: 52
ADLS_ACUITY_SCORE: 59
ADLS_ACUITY_SCORE: 52
DEPENDENT_IADLS:: CLEANING;COOKING;LAUNDRY;MEDICATION MANAGEMENT;MONEY MANAGEMENT;TRANSPORTATION
ADLS_ACUITY_SCORE: 52
ADLS_ACUITY_SCORE: 59
ADLS_ACUITY_SCORE: 52
ADLS_ACUITY_SCORE: 59

## 2025-01-21 NOTE — PLAN OF CARE
Goal Outcome Evaluation:        -diagnostic tests and consults completed and resulted: met   -vital signs normal or at patient baseline: met. Per pt feels like he is improving and at his baseline

## 2025-01-21 NOTE — PROGRESS NOTES
"[facility]    Medicine Progress Note - Hospitalist Service    Date of Admission:  1/19/2025    Assessment & Plan   Obi Messer V is a 69 year old male admitted on 1/19/2025.  History of HFpEF, coronary artery disease, hypertension, chronic kidney disease, depression, dermatomyositis, bipolar disorder, atrial fibrillation & type 2 diabetes admitted for generalized weakness.     Updates Today:  - continue to work with SW/RNCC for dispo planning  - continue to work with PT/OT regarding dispo planning   - patient does not currently reside in TCU but rather is currently residing at the facility's LTC side as a safe discharge plan was not identified following his TCU stay. Will likely need re-evaluation of needs from PT/OT as he does not have the PTA therapies originally thought   - discussed echo findings with Pedro Luis. Would prefer to follow up with his PCP at discharge to discuss options and potentially have a referral sent at that time within their health system     Weakness   Hx of left hip fracture s/p fall   Describes an episode where he \"lost control of my body both upper and lower extremities\" and was unable to ambulate without any other associated symptoms or known inciting illness.  No loss of consciousness.  At baseline utilizes a walker.  Vitally stable.  Labs without leukocytosis, normal procal and mildly elevated CRP.  EKG and troponin reassuring against cardiac event.  CT head without acute process.  Chest x-ray noted to have possible density in the left lower lung with? pneumonitis, clinically does not appear like overt pneumonia. COVID, flu A and B, and RSV negative. Initially appeared hypervolemic and was diuresed. He did have a hospitalization in April 2024 that began with weakness and failure to thrive. TSH, CK, troponin, and procal WNL. CRP elevated at 15.20 however unremarkable workup. MRI brain 1/20 with no acute intracranial pathology; moderate " generalized volume loss and chronic small vessel ischemic disease. Echo 1/20 with mildly reduced LV function, EF 45-50%, inferior wall akinesis, RV function normal, no pericardial effusion.   - PT/OT consulted, appreciate assistance   - if fevers, would culture and start antibiotics      HTN  Hx of ischemic cardiomyopathy   HFrEF   Hx CAD s/p PCI with LACHO to LAD and LCX  BNP elevated to 1300 (though higher in the past), bedside echo performed by emergency department physician demonstrated grossly diminished function. S/p 1L fluid followed by diuresis in the ED. Previous echo in April 2024 at OSH with EF 55-60%, normal LV function, normal RV function. Repeat echo on 1/20 with EF 45-50%, mildly reduced LV function, inferior wall akinesis, RV function normal, no pericardial effusion. Previously on lasix and spironolactone however does not take anymore. Discussed echo findings with Pedro Luis. Would prefer to follow up with his PCP at discharge to discuss options and potentially have a referral sent at that time within their health system   - continue PTA atorvastatin 40 mg daily   - continue PTA lisinopril 5 mg daily  - decrease PTA metoprolol 100 mg BID to 75 mg BID d/t pressure on admission   - continue PTA eliquis 5 mg BID      Atrial fibrillation  - resume full dose PTA metoprolol 100 mg BID  - continue PTA eliquis 5 mg BID      Type 2 DM  HgbA1c 6.2% on 1/19/2025.   - hold PTA metformin ER 1000 mg daily  while in the hospital  - POCT glucose checks   - no sliding scale insulin at this time      CKD stage 3  Creatinine 0.99 on admission, appears stable per chart review.   - continue to monitor; renally dose medications; avoid nephrotoxic meds when able         Bipolar disorder  Depression  - continue PTA seroquel 400 mg at bedtime, duloxetine 120 mg daily, bupropion  mg daily, and lamotrigine 50 mg daily        Observation Goals: -diagnostic tests and consults completed and resulted, -vital signs normal or at  "patient baseline, Nurse to notify provider when observation goals have been met and patient is ready for discharge.  Diet: 2 Gram Sodium Diet  Diet    DVT Prophylaxis: DOAC  Reis Catheter: Not present  Lines: None     Cardiac Monitoring: None  Code Status: No CPR- Do NOT Intubate      Clinically Significant Risk Factors Present on Admission          # Hyperchloremia: Highest Cl = 109 mmol/L in last 2 days, will monitor as appropriate           # Drug Induced Coagulation Defect: home medication list includes an anticoagulant medication  # Drug Induced Platelet Defect: home medication list includes an antiplatelet medication   # Hypertension: Noted on problem list           # Obesity: Estimated body mass index is 30.99 kg/m  as calculated from the following:    Height as of this encounter: 1.727 m (5' 8\").    Weight as of this encounter: 92.4 kg (203 lb 13 oz).       # Financial/Environmental Concerns: other (see comments) (Pt shared his social security is the only money he gets and it is not enough.)         Social Drivers of Health    Food Insecurity: Food Insecurity Present (4/3/2024)    Received from [facility]    Food Insecurity     Do you worry your food will run out before you are able to buy more?: 2   Housing Stability: High Risk (4/3/2024)    Received from [facility]    Housing Stability     What is your housing situation today?: 3   Tobacco Use: High Risk (7/11/2021)    Patient History     Smoking Tobacco Use: Former     Smokeless Tobacco Use: Current   Social Connections: Socially Isolated (4/3/2024)    Received from [facility]    Social Connections     Do you often feel lonely or isolated from those around you?: 4          Disposition Plan     Medically Ready for Discharge: Ready Now; disposition needs            The patient's care was discussed with the Attending Physician, Dr. Cheney, Bedside Nurse, " Care Coordinator/, and Patient.    Tatum Maurice NP  Hospitalist Service  [facility]  Securely message with [employer] (more info)  Text page via [facility] Paging/Directory   ______________________________________________________________________    Interval History   Pedro Luis was seen sitting up in bed today. No acute events overnight. We discuss his echo findings and confirm that he has not seen a cardiologist since 2022. We discuss the options of placing a referral at discharge, having him reach out to his previous cardiology clinic ([facility]) to obtain an appointment, or following up with his PCP and arranging follow-up. Pedro Luis opted to follow up with his PCP due to insurance purposes. All questions and concerns addressed at this time.     Physical Exam   Vital Signs: Temp: 97.3  F (36.3  C) Temp src: Oral BP: (!) 159/126 Pulse: 74   Resp: 15 SpO2: 99 % O2 Device: None (Room air)    Weight: 203 lbs 13.04 oz    GENERAL: Alert and awake. Answering questions appropriately. Oriented x 3. NAD. Pleasant and conversational   HEENT: Anicteric sclera. EOMI. Mucous membranes moist   CARDIOVASCULAR: RRR. S1, S2. No murmurs, rubs, or gallops.   RESPIRATORY: Effort normal on RA. Clear to auscultation bilaterally, no rales, rhonchi or wheezes  GI: Abdomen soft, non-tender abdomen without rebound or guarding, normoactive bowel sounds present  MUSCULOSKELETAL: Moves all extremities.   EXTREMITIES: No peripheral edema. No calf asymmetry, erythema, or tenderness.   NEUROLOGICAL: No focal deficits. Moving all extremities symmetrically.   SKIN: Intact. Warm and dry. No jaundice. No rashes on exposed skin  PSYCH: Affect appropriate     Medical Decision Making       60 MINUTES SPENT BY ME on the date of service doing chart review, history, exam, documentation & further activities per the note.      Data   Imaging results reviewed over the past 24 hrs:   No results found for this or  any previous visit (from the past 24 hours).  Recent Labs   Lab 01/21/25  1030 01/21/25  0945 01/21/25  0605 01/20/25  1735 01/20/25  0907 01/20/25  0543 01/19/25 2113 01/19/25 2110   WBC 10.4  --   --   --   --  8.1  --  9.0   HGB 14.5  --   --   --   --  13.3  --  12.5*   MCV 88  --   --   --   --  89  --  90     --   --   --   --  174  --  176   INR 1.04  --   --   --   --  1.18* 1.26*  --    NA  --   --  139  --   --  140  --  137   POTASSIUM  --   --  4.4  --   --  3.6  --  4.3   CHLORIDE  --   --  109*  --   --  108*  --  107   CO2  --   --  22  --   --  21*  --  20*   BUN  --   --  23.1*  --   --  20.0  --  23.6*   CR  --   --  1.04  --   --  0.94  --  0.99   ANIONGAP  --   --  8  --   --  11  --  10   CHUCK  --   --  9.2  --   --  9.2  --  8.9   GLC  --  88 100* 166*   < > 83  --  125*   ALBUMIN  --   --   --   --   --   --   --  3.9   PROTTOTAL  --   --   --   --   --   --   --  7.0   BILITOTAL  --   --   --   --   --   --   --  0.3   ALKPHOS  --   --   --   --   --   --   --  111   ALT  --   --   --   --   --   --   --  18   AST  --   --   --   --   --   --   --  21    < > = values in this interval not displayed.

## 2025-01-21 NOTE — CONSULTS
Care Management Initial Consult    General Information  Assessment completed with: Patient,    Type of CM/SW Visit: Initial Assessment    Primary Care Provider verified and updated as needed: Yes   Readmission within the last 30 days: no previous admission in last 30 days      Reason for Consult: discharge planning  Advance Care Planning: Advance Care Planning Reviewed: concerns discussed        Communication Assessment  Patient's communication style: spoken language (English or Bilingual)    Hearing Difficulty or Deaf: no      Cognitive  Cognitive/Neuro/Behavioral: WDL  Level of Consciousness: alert  Arousal Level: opens eyes spontaneously  Orientation: disoriented to, time  Mood/Behavior: behavior appropriate to situation  Best Language: 0 - No aphasia  Speech: clear, logical, spontaneous    Living Environment:   People in home: facility resident     Current living Arrangements: other (see comments) (LTC facility)  Name of Facility: [facility]   Able to return to prior arrangements: yes     Family/Social Support:  Care provided by: self, other (see comments) (facility staff)  Provides care for: no one  Marital Status:   Support system: Facility resident(s)/Staff          Description of Support System: Supportive    Support Assessment: Limited social contact and support (Pt shared he has no family.)    Current Resources:   Patient receiving home care services: Yes  Skilled Home Care Services: Physical Therapy     Community Resources: None  Equipment currently used at home: dressing device, grab bar, tub/shower, walker, standard  Supplies currently used at home: None    Employment/Financial:  Employment Status: retired        Financial Concerns: other (see comments) (Pt shared his social security is the only money he gets and it is not enough.)   Referral to Financial Worker: No    Does the patient's insurance plan have a 3 day qualifying hospital stay waiver?  No    Lifestyle & Psychosocial  Needs:  Social Drivers of Health     Food Insecurity: Food Insecurity Present (4/3/2024)    [facility] from [facility]    Food Insecurity     Do you worry your food will run out before you are able to buy more?: 2   Depression: Not at risk (5/26/2020)    PHQ-2     PHQ-2 Score: 2   Housing Stability: High Risk (4/3/2024)    Received from [facility]    Housing Stability     What is your housing situation today?: 3   Tobacco Use: High Risk (7/11/2021)    Patient History     Smoking Tobacco Use: Former     Smokeless Tobacco Use: Current     Passive Exposure: Not on file   Financial Resource Strain: Low Risk  (4/3/2024)    Received from [facility]    Financial Resource Strain     Difficulty of Paying Living Expenses: 3     Difficulty of Paying Living Expenses: Not on file   Alcohol Use: Not on file   Transportation Needs: No Transportation Needs (4/3/2024)    Received from [facility]    Transportation Needs     Does lack of transportation keep you from medical appointments?: 1     Does lack of transportation keep you from work, meetings or getting things that you need?: 1   Physical Activity: Not on file   Interpersonal Safety: Not on file   Stress: Not on file   Social Connections: Socially Isolated (4/3/2024)    Received from [facility]    Social Connections     Do you often feel lonely or isolated from those around you?: 4   Health Literacy: Not on file     Functional Status:  Prior to admission patient needed assistance:   Dependent ADLs:: Ambulation-walker, Bathing  Dependent IADLs:: Cleaning, Cooking, Laundry, Medication Management, Money Management, Transportation     Mental Health Status:  Mental Health Status: No Current Concerns       Chemical Dependency Status:  Chemical Dependency Status: No Current Concerns            Values/Beliefs:  Spiritual, Cultural Beliefs, Confucianist Practices, Values that affect care: no             Discussed  Partnership in Safe Discharge Planning  document with patient/family: No    Additional Information:  SW received consult for discharge planning. SW met with pt at bedside and introduced self , role , and explained purpose of assessment. Pt reported he lives at [facility] and has been receiving PT. Pt is not certain whether he was at their LTC facility or TCU and reported the staff has been helping him with his ADLs and IADLs needs. Pt shared he gets supervision for bathing and utilizes a walker at the facility. Pt reported financial concerns due to social security money not being enough and stated he has no community services or waivers. Pt reported he has no family and needs help with discharge transportation back to facility. SAGAR called [facility] and spoke to Dalia who confirmed that pt is resident of the LTC facility and has been previously receiving PT. SAGAR called back [facility] to ask if a new order is needed for PT or a resumption of previous PT and spoke with Magui who asked for resumption of PT. Magui asked for SW to fax ( [phone]) orders and SAGAR will arrange discharge transportation when [facility] confirms they have receives orders.     SAGAR received a call from SAGAR Martinez ( [phone])  at [facility] and she reported that pt is not appropriate for level of care at LT since they have only been helping with medication management. Michelle reported that pt was previously at shelter prior to coming to their TCU in July and they have been working on [facility] placement. SAGAR asked payment for detention since pt reported he has no family and Michelle shared they have applied for MA for pt back in May  and were  awaiting for his application to be processed.  SAGAR notified Michelle that pt was ready for discharge, his orders are in and she talked to nurses at [facility]  "Home who confirmed that pt can return today. Michelle apologized for what happened and shared they were in a meeting discussing the pt. SAGAR met with pt at bedside and told him what Michelle has said about him not being able to return to [facility]. Pt was sadden by news and kept repeating that the facility told him \" they will find him a placement\" and lied to him. Pt stated that no one from [facility] has called him to tell him this.  SAGAR updated pt's Obs 1 provider and bedside nurse.     Next Steps:   -Follow up on pt's pending LOUISE Salazar, LGSAGAR  OBS/ED   Phone: [phone]  Fax: [phone]  Vocera: 1A Obs SAGAR   "

## 2025-01-21 NOTE — PLAN OF CARE
"-diagnostic tests and consults completed and resulted - met  -vital signs normal or at patient baseline - met   BP (!) 159/126 (BP Location: Right arm)   Pulse 74   Temp 97.3  F (36.3  C) (Oral)   Resp 15   Ht 1.727 m (5' 8\")   Wt 92.4 kg (203 lb 13 oz)   SpO2 99%   BMI 30.99 kg/m      PRIMARY DIAGNOSIS: GENERALIZED WEAKNESS    OUTPATIENT/OBSERVATION GOALS TO BE MET BEFORE DISCHARGE  1. Orthostatic performed: N/A    2. Tolerating PO medications: Yes    3. Return to near baseline physical activity: Yes    4. Cleared for discharge by consultants (if involved): No    Discharge Planner Nurse   Safe discharge environment identified: Yes  Barriers to discharge: Yes       Entered by: Yesenia García RN 01/21/2025 10:28 AM    "

## 2025-01-21 NOTE — PLAN OF CARE
"-diagnostic tests and consults completed and resulted - met  -vital signs normal or at patient baseline - met   BP (!) 159/126 (BP Location: Right arm)   Pulse 74   Temp 97.3  F (36.3  C) (Oral)   Resp 15   Ht 1.727 m (5' 8\")   Wt 92.4 kg (203 lb 13 oz)   SpO2 99%   BMI 30.99 kg/m      PRIMARY DIAGNOSIS: GENERALIZED WEAKNESS    OUTPATIENT/OBSERVATION GOALS TO BE MET BEFORE DISCHARGE  1. Orthostatic performed: N/A    2. Tolerating PO medications: Yes    3. Return to near baseline physical activity: Yes    4. Cleared for discharge by consultants (if involved): No    Discharge Planner Nurse   Safe discharge environment identified: No  Barriers to discharge: Yes       Entered by: Yesenia García RN 01/21/2025 12:37 PM      "

## 2025-01-21 NOTE — PROGRESS NOTES
"OBS PT Evaluation:      01/21/25 1500   Appointment Info   Signing Clinician's Name / Credentials (PT) Rudi Shine, PT, DPT   Quick Adds   Quick Adds Certification   Living Environment   People in Home facility resident   Current Living Arrangements other (see comments)  (LTC)   Home Accessibility no concerns   Transportation Anticipated agency   Living Environment Comments Currently resides in a LTC facility, was at their TCU prior. Facility was working on finding custodial for pt.   Self-Care   Equipment Currently Used at Home walker, standard;walker, rolling;grab bar, toilet;grab bar, tub/shower   Fall history within last six months yes   Number of times patient has fallen within last six months   (\"couple\")   Activity/Exercise/Self-Care Comment Pt reports they are IND/mod I with mobility and basic ADLs at baseline. Ambulates with 4WW. Facility assists with IADLs (meals, meds, and transportation). Reports they work with PT at the facility regularly. Endorses hx of falls due to chronic balance impairments.   General Information   Onset of Illness/Injury or Date of Surgery 01/19/25   Referring Physician Tatum Maurice NP   Patient/Family Therapy Goals Statement (PT) return to facility   Pertinent History of Current Problem (include personal factors and/or comorbidities that impact the POC) Per EMR: Obi Messer V is a 69 year old male admitted on 1/19/2025.  History of HFpEF, coronary artery disease, hypertension, chronic kidney disease, depression, dermatomyositis, bipolar disorder, atrial fibrillation & type 2 diabetes admitted for generalized weakness.   Existing Precautions/Restrictions fall   General Observations Pt up in chair, pleasant, agreeable to session.   Cognition   Affect/Mental Status (Cognition) WFL   Posture    Posture Forward head position;Protracted shoulders   Range of Motion (ROM)   Range of Motion ROM is WFL   Strength (Manual Muscle Testing)   Strength (Manual Muscle Testing) Deficits " observed during functional mobility   Strength Comments Grossly deconditioned   Transfers   Transfers sit-stand transfer   Sit-Stand Transfer   Sit-Stand Hand (Transfers) modified independence   Assistive Device (Sit-Stand Transfers) walker, front-wheeled   Gait/Stairs (Locomotion)   Hand Level (Gait) supervision   Assistive Device (Gait) walker, front-wheeled   Distance in Feet (Gait) 5   Balance   Balance Comments Dependence on FWW with ambulatory activities   Sensory Examination   Sensory Perception patient reports no sensory changes   Coordination   Coordination no deficits were identified   Clinical Impression   Criteria for Skilled Therapeutic Intervention Yes, treatment indicated   PT Diagnosis (PT) impaired functional mobiltiy   Influenced by the following impairments Deconditioning, chronic balance impairements   Functional limitations due to impairments gait, balance, activity tolerance   Clinical Presentation (PT Evaluation Complexity) stable   Clinical Presentation Rationale Clinical judgment   Clinical Decision Making (Complexity) low complexity   Planned Therapy Interventions (PT) balance training;gait training;home exercise program;patient/family education;strengthening;risk factor education;progressive activity/exercise;home program guidelines   Risk & Benefits of therapy have been explained evaluation/treatment results reviewed;care plan/treatment goals reviewed;risks/benefits reviewed;current/potential barriers reviewed;participants voiced agreement with care plan;participants included;patient   PT Total Evaluation Time   PT Eval, Low Complexity Minutes (01484) 6   Therapy Certification   Start of care date 01/19/25   Certification date from 01/21/25   Certification date to 02/04/25   Medical Diagnosis debility   Physical Therapy Goals   PT Frequency 3x/week   PT Predicted Duration/Target Date for Goal Attainment 02/04/25   PT Goals Gait;PT Goal 1   PT: Gait Modified  independent;Greater than 200 feet;Assistive device;Standard walker   PT: Goal 1 Demonstrate low falls risk with a balance assessment score of < 13.5 seconds on TUG,  > 19/24 on Dynamic Gait Index, > 9/12 on 4-Item Dynamic Gait Index, >22/30 on Functional Gait Assessment, or > 45/56 on Meyers Balance Assessment.   PT Discharge Planning   PT Plan gait with FWW/4WW, dynamic balance, functional strengthening   PT Discharge Recommendation (DC Rec) home with assist;home with home care physical therapy   PT Rationale for DC Rec Patient mobilizing at baseline. Appears safe to return back to LTC vs nursing home when medically ready. Recommend HH PT to address chronic balance impairments to reduce fall risk. Will also defer to OT for additional insight/recommendations regarding ADLs.   PT Brief overview of current status Mod I with FWW; encourage to walk halls as able for activity during stay   Physical Therapy Time and Intention   Total Session Time (sum of timed and untimed services) [address] [place] [address]                                                                                   OUTPATIENT PHYSICAL THERAPY    PLAN OF TREATMENT FOR OUTPATIENT REHABILITATION   Patient's Last Name, First Name, Obi Rinaldi    YOB: 1955   Provider's Name   [facility]   Medical Record No.  [id number]     Onset Date: 01/19/25 Start of Care Date: 01/19/25     Medical Diagnosis:  debility               PT Diagnosis:  impaired functional mobiltiy Certification Dates:  From: 01/21/25  To: 02/04/25       See note for plan of treatment, functional goals, and certification details.    I CERTIFY THE NEED FOR THESE SERVICES FURNISHED UNDER        THIS PLAN OF TREATMENT AND WHILE UNDER MY CARE (Physician co-signature of this document indicates review and certification of the therapy plan).

## 2025-01-21 NOTE — PLAN OF CARE
Goal Outcome Evaluation:      Plan of Care Reviewed With: patient          Outcome Evaluation: Pt will discharge back to LTC facility today.

## 2025-01-21 NOTE — DISCHARGE SUMMARY
"[facility]  Hospitalist Discharge Summary      Date of Admission:  1/19/2025  Date of Discharge:  1/21/2025  Discharging Provider: Tatum Maurice NP  Discharge Service: Hospitalist Service    Discharge Diagnoses   Weakness   Hx of left hip fracture s/p fall   HTN  Hx of ischemic cardiomyopathy   HFrEF   Hx CAD s/p PCI with LACHO to LAD and LCX  Atrial fibrillation  Type 2 DM  CKD stage 3  Bipolar disorder  Depression    Clinically Significant Risk Factors     # Obesity: Estimated body mass index is 30.99 kg/m  as calculated from the following:    Height as of this encounter: 1.727 m (5' 8\").    Weight as of this encounter: 92.4 kg (203 lb 13 oz).       Follow-ups Needed After Discharge   Follow-up Appointments       Adult [facility] Follow-up and recommended labs and tests      Follow up with primary care provider, DAVID GANT, within 7 days for hospital follow- up and to follow up on results.  No follow up labs or test are needed. Please discuss a potential referral to a cardiologist within your network at discussed on day of discharge.       Appointments on [place] and/or [facility] (with [facility] or [facility] provider or service). Call [phone] if you haven't heard regarding these appointments within 7 days of discharge.                Unresulted Labs Ordered in the Past 30 Days of this Admission       No orders found from 12/20/2024 to 1/20/2025.        These results will be followed up by PCP    Discharge Disposition   Discharged to long-term care facility  Condition at discharge: Stable    Hospital Course   Obi Messer V is a 69 year old male admitted on 1/19/2025.  History of HFpEF, coronary artery disease, hypertension, chronic kidney disease, depression, dermatomyositis, bipolar disorder, atrial fibrillation & type 2 diabetes admitted for generalized weakness.      Weakness   Hx of left hip fracture s/p fall   Describes an episode where he \"lost control " "of my body both upper and lower extremities\" and was unable to ambulate without any other associated symptoms or known inciting illness.  No loss of consciousness.  At baseline utilizes a walker.  Vitally stable.  Labs without leukocytosis, normal procal and mildly elevated CRP.  EKG and troponin reassuring against cardiac event.  CT head without acute process.  Chest x-ray noted to have possible density in the left lower lung with? pneumonitis, clinically does not appear like overt pneumonia. COVID, flu A and B, and RSV negative. Initially appeared hypervolemic and was diuresed. He did have a hospitalization in April 2024 that began with weakness and failure to thrive. TSH, CK, troponin, and procal WNL. CRP elevated at 15.20 however unremarkable workup. MRI brain 1/20 with no acute intracranial pathology; moderate generalized volume loss and chronic small vessel ischemic disease. Echo 1/20 with mildly reduced LV function, EF 45-50%, inferior wall akinesis, RV function normal, no pericardial effusion. PT/OT recommended discharging back to his facility with resumption of PT.      HTN  Hx of ischemic cardiomyopathy   HFrEF   Hx CAD s/p PCI with LACHO to LAD and LCX  BNP elevated to 1300 (though higher in the past), bedside echo performed by emergency department physician demonstrated grossly diminished function. S/p 1L fluid followed by diuresis in the ED. Previous echo in April 2024 at OSH with EF 55-60%, normal LV function, normal RV function. Repeat echo on 1/20 with EF 45-50%, mildly reduced LV function, inferior wall akinesis, RV function normal, no pericardial effusion. Previously on lasix and spironolactone however does not take anymore. Will resume PTA atorvastatin, lisinopril, metoprolol, and eliquis at discharge. Discussed his most recent echo and placing referral for OP cardiology as he has not seen a cardiologist since 2022. He says that he would prefer to see his PCP first and have them place a referral in " their system as needed.      Atrial fibrillation  Continue PTA eliquis and metoprolol at discharge.      Type 2 DM  HgbA1c 6.2% on 1/19/2025. Resume PTA metformin at discharge.      CKD stage 3  Creatinine 0.99 on admission, appears stable per chart review.      Bipolar disorder  Depression  Continue PTA seroquel 400 mg at bedtime, duloxetine 120 mg daily, bupropion  mg daily, and lamotrigine 50 mg daily at discharge.     Consultations This Hospital Stay   NURSING TO CONSULT FOR VASCULAR ACCESS CARE IP CONSULT  PHARMACY TO DOSE WARFARIN  PHYSICAL THERAPY ADULT IP CONSULT  OCCUPATIONAL THERAPY ADULT IP CONSULT  MEDICATION HISTORY IP PHARMACY CONSULT  CARE MANAGEMENT / SOCIAL WORK IP CONSULT    Code Status   No CPR- Do NOT Intubate    Time Spent on this Encounter   I, Tatum Maurice NP, personally saw the patient today and spent greater than 30 minutes discharging this patient.       Tatum Maurice NP  [facility] [address] OBSERVATION  [address] [address]  Phone: [phone]  Fax: [phone]  ______________________________________________________________________    Physical Exam   Vital Signs: Temp: 97.3  F (36.3  C) Temp src: Oral BP: (!) 159/126 Pulse: 74   Resp: 15 SpO2: 99 % O2 Device: None (Room air)    Weight: 203 lbs 13.04 oz    GENERAL: Alert and awake. Answering questions appropriately. Oriented x 3. NAD. Pleasant and conversational   HEENT: Anicteric sclera. EOMI. Mucous membranes moist   CARDIOVASCULAR: RRR. S1, S2. No murmurs, rubs, or gallops.   RESPIRATORY: Effort normal on RA. Clear to auscultation bilaterally, no rales, rhonchi or wheezes  GI: Abdomen soft, non-tender abdomen without rebound or guarding, normoactive bowel sounds present  MUSCULOSKELETAL: Moves all extremities.   EXTREMITIES: No peripheral edema. No calf asymmetry, erythema, or tenderness.   NEUROLOGICAL: No focal deficits. Moving all extremities symmetrically.   SKIN: Intact.  Warm and dry. No jaundice. No rashes on exposed skin  PSYCH: Affect appropriate        Primary Care Physician   DAVID GANT    Discharge Orders      Reason for your hospital stay    You presented to the ED for generalized weakness and difficulty with walking. Large workup was completed including labs and CT scan which was unremarkable. PT/OT worked with you and determined you were safe to return to your facility with resumption of physical therapy.     Activity    Your activity upon discharge: activity as tolerated     Adult [facility] Follow-up and recommended labs and tests    Follow up with primary care provider, DAVID GANT, within 7 days for hospital follow- up and to follow up on results.  No follow up labs or test are needed. Please discuss a potential referral to a cardiologist within your network at discussed on day of discharge.       Appointments on [place] and/or [facility] (with [facility] or [facility] provider or service). Call [phone] if you haven't heard regarding these appointments within 7 days of discharge.     Resume Home Care Services     Diet    Follow this diet upon discharge: Current Diet:Orders Placed This Encounter      2 Gram Sodium Diet       Significant Results and Procedures   Most Recent 3 CBC's:  Recent Labs   Lab Test 01/21/25  1030 01/20/25  0543 01/19/25  2110   WBC 10.4 8.1 9.0   HGB 14.5 13.3 12.5*   MCV 88 89 90    174 176     Most Recent 3 BMP's:  Recent Labs   Lab Test 01/21/25  0945 01/21/25  0605 01/20/25  1735 01/20/25  0907 01/20/25  0543 01/19/25  2110   NA  --  139  --   --  140 137   POTASSIUM  --  4.4  --   --  3.6 4.3   CHLORIDE  --  109*  --   --  108* 107   CO2  --  22  --   --  21* 20*   BUN  --  23.1*  --   --  20.0 23.6*   CR  --  1.04  --   --  0.94 0.99   ANIONGAP  --  8  --   --  11 10   CHUCK  --  9.2  --   --  9.2 8.9   GLC 88 100* 166*   < > 83 125*    < > = values in this interval not displayed.   ,   Results for orders placed or performed  during the hospital encounter of 01/19/25   CT Head w/o Contrast    Narrative    EXAM: CT HEAD W/O CONTRAST  LOCATION: [facility]  DATE: 1/19/2025    INDICATION: AMS  COMPARISON: 12/18/2024  TECHNIQUE: Routine CT Head without IV contrast. Multiplanar reformats. Dose reduction techniques were used.    FINDINGS:  INTRACRANIAL CONTENTS: No intracranial hemorrhage, extraaxial collection, or mass effect.  No CT evidence of acute infarct. Mild to moderate presumed chronic small vessel ischemic changes. Mild to moderate generalized volume loss. No hydrocephalus.     VISUALIZED ORBITS/SINUSES/MASTOIDS: No intraorbital abnormality. No paranasal sinus mucosal disease. No middle ear or mastoid effusion.    BONES/SOFT TISSUES: No acute abnormality.      Impression    IMPRESSION:  1.  No acute intracranial process.     XR Chest 2 Views    Narrative    EXAM: XR CHEST 2 VIEWS  LOCATION: [facility]  DATE: 1/19/2025    INDICATION: Weakness.  COMPARISON: 6/4/2024.      Impression    IMPRESSION: Heart size and pulmonary vascularity within normal limits. Mild hazy increased density left lower lung with a mild or low-grade pneumonitis not excluded. Clinical correlation. Right lung is clear. Aortic calcification. Degenerative   postoperative changes in the spine. Otherwise unremarkable.   POC US ECHO LIMITED    Impression    Bedside, focused cardiac ultrasound performed and interpreted by me.    Indication: Eval global function    Parasternal long, parasternal short, apical four-chamber views were acquired for gross evaluation of global cardiac function, Gross RV strain and pericardial effusion.  Image quality was satisfactory. Global left ventricular function appears grossly diminished.  The LV chamber appears larger than the RV chamber.  There is biatrial enlargement.  There is no evidence of free fluid within the pericardium.     Impression:  Bedside limited cardiac ultrasound showing grossly diminished left ventricular function. No gross evidence for right ventricular strain.   There is no pericardial effusion.  Atrial enlargement      Bedside focused lung ultrasound performed and interpreted by me.     Indication: Eval for evidence of pulmonary edema     Patient position: semirecumbent    Windows: apical fields, lateral fields.     Findings:    Predominance  Impression: pulmonary edema          MR Brain w/o Contrast    Narrative    MR BRAIN W/O CONTRAST 2025 10:18 AM    Provided History: reported weakness, R>L, eval CVA  ICD-10:    Comparison:  Head CT 2025     Technique: Sagittal T1-weighted, coronal T2-weighted, axial T2 FLAIR,  axial susceptibility weighted, and axial diffusion-weighted with ADC  map images of the brain were obtained without intravenous contrast.    Findings: No intracranial mass lesion, mass effect, midline shift, or  abnormal fluid collection. Moderate generalized volume loss with  enlarged right precentral sulcus and bilateral temporal sulci and  enlarged lateral ventricles. Moderate chronic small vessel ischemic  changes. Subcortical T2 hyperintensity and suspected cortical loss in  the posterior right middle frontal gyrus No abnormality of reduced  diffusion.  Normal intravascular flow voids.       Impression    Impression: No acute intracranial pathology. Moderate generalized  volume loss and chronic small vessel ischemic disease.    I have personally reviewed the examination and initial interpretation  and I agree with the findings.    LEONARD LEONE MD         SYSTEM ID:  [id number]   Echo Complete     Value    LVEF  45-50% (mildly reduced)    Narrative    [id number]  [id number]  [id number]  [id number]^ERI^REJI^NICK     [facility]  Echocardiography Laboratory  [address] [place] [id number]     Name: GLORIA MARTIN V  MRN: [id number]  : 1955  Study Date: 2025  11:27 AM  Age: 69 yrs  Gender: Male  Patient Location: [facility]  Reason For Study: Dyspnea  Ordering Physician: REJI HWANG  Performed By: Elfego Fernando RDCS     BSA: 2.3 m2  Height: 68 in  Weight: 272 lb  HR: 80  BP: 147/96 mmHg  ______________________________________________________________________________  Procedure  Echocardiogram with two-dimensional, color and spectral Doppler.  ______________________________________________________________________________  Interpretation Summary  Left ventricular size is normal.  Left ventricular function is decreased. The ejection fraction is 45-50%  (mildly reduced).  Inferior wall akinesis is present.  Right ventricular function, chamber size, wall motion, and thickness are  normal.  Pulmonary artery systolic pressure cannot be assessed.  The inferior vena cava is normal.  No pericardial effusion is present.  ______________________________________________________________________________  Left Ventricle  Left ventricular size is normal. Left ventricular wall thickness is normal.  Left ventricular function is decreased. The ejection fraction is 45-50%  (mildly reduced). Diastolic function not assessed due to arrhythmia. Inferior  wall akinesis is present.     Right Ventricle  Right ventricular function, chamber size, wall motion, and thickness are  normal.     Atria  The right atria appears normal. Mild left atrial enlargement is present. The  atrial septum is intact as assessed by color Doppler .     Mitral Valve  The mitral valve is normal. Trace to mild mitral insufficiency is present.     Aortic Valve  Aortic valve sclerosis is present.     Tricuspid Valve  The tricuspid valve is normal. Trace tricuspid insufficiency is present.  Pulmonary artery systolic pressure cannot be assessed.     Pulmonic Valve  The pulmonic valve is normal.     Vessels  The thoracic aorta is normal. The pulmonary artery is normal. The inferior  vena cava is normal.     Pericardium  No  pericardial effusion is present.     ______________________________________________________________________________  MMode/2D Measurements & Calculations  IVSd: 1.1 cm  LVIDd: 5.4 cm  LVIDs: 4.3 cm  LVPWd: 1.2 cm  FS: 20.2 %     LV mass(C)d: 257.0 grams  LV mass(C)dI: 110.4 grams/m2  Ao root diam: 3.3 cm  asc Aorta Diam: 3.5 cm  LVOT diam: 2.2 cm  LVOT area: 3.8 cm2     EF(MOD-bp): 48.5 %  Ao root diam index Ht(cm/m): 1.9  Ao root diam index BSA (cm/m2): 1.4  Asc Ao diam index BSA (cm/m2): 1.5  Asc Ao diam index Ht(cm/m): 2.0  LA Volume (BP): 85.1 ml     LA Volume Index (BP): 36.5 ml/m2  RV Base: 4.3 cm  RWT: 0.46  TAPSE: 2.0 cm     Doppler Measurements & Calculations  MV E max slick: 106.0 cm/sec  MV dec slope: 546.6 cm/sec2  MV dec time: 0.19 sec  PA acc time: 0.09 sec  E/E' av.6  Lateral E/e': 5.8  Medial E/e': 11.3  RV S Slick: 11.0 cm/sec     ______________________________________________________________________________  Report approved by: TIAGO Cuellar MD on 2025 12:38 PM             Discharge Medications   Current Discharge Medication List        CONTINUE these medications which have NOT CHANGED    Details   acetaminophen (TYLENOL) 500 MG tablet Take 1,000 mg by mouth 3 times daily.      apixaban ANTICOAGULANT (ELIQUIS) 5 MG tablet Take 5 mg by mouth 2 times daily.      aspirin EC 81 MG EC tablet Take 1 tablet by mouth daily.  Qty: 100 tablet, Refills: 5    Associated Diagnoses: CAD (coronary artery disease)      atorvastatin (LIPITOR) 40 MG tablet Take 40 mg by mouth every evening.      !! buPROPion (WELLBUTRIN XL) 150 MG 24 hr tablet Take 150 mg by mouth every morning. Take with the 300 mg tablet for total morning dose of 450 mg      !! buPROPion (WELLBUTRIN XL) 300 MG 24 hr tablet Take 300 mg by mouth every morning. Take with the 150 mg tablet for total morning dose of 450 mg      carboxymethylcellulose PF (REFRESH PLUS) 0.5 % ophthalmic solution Place 1 drop into both eyes every hour as needed for  dry eyes.      cetirizine (ZYRTEC) 10 MG tablet Take 10 mg by mouth daily.      clotrimazole (LOTRIMIN) 1 % external cream Apply topically daily as needed (groin rash).      Dextromethorphan-guaiFENesin  MG/20ML LIQD Take 10 mLs by mouth every 4 hours as needed (cough).      diclofenac (VOLTAREN) 1 % topical gel Apply 2 g topically every 6 hours as needed for moderate pain (knees).      DULoxetine (CYMBALTA) 60 MG capsule Take 120 mg by mouth daily.      folic acid (FOLVITE) 1 MG tablet Take 1 mg by mouth daily.      gabapentin (NEURONTIN) 100 MG capsule Take 300 mg by mouth 3 times daily.      lamoTRIgine (LAMICTAL) 25 MG tablet Take 50 mg by mouth daily.      lisinopril (PRINIVIL,ZESTRIL) 5 MG tablet Take 1 tablet by mouth 2 times daily.  Qty: 90 tablet, Refills: 5    Associated Diagnoses: Unspecified essential hypertension      loperamide (IMODIUM) 1 MG/7.5ML liquid Take 2 mg by mouth 4 times daily as needed for diarrhea.      metFORMIN (GLUCOPHAGE XR) 500 MG 24 hr tablet Take 1,000 mg by mouth daily (with breakfast).      methocarbamol (ROBAXIN) 500 MG tablet Take 500 mg by mouth 3 times daily as needed for muscle spasms.      metoprolol succinate ER (TOPROL XL) 100 MG 24 hr tablet Take 100 mg by mouth 2 times daily.      !! NONFORMULARY PROSTATE X PLUS -  take 1 capsule every 24 hr as needed for prostate supplement.      !! NONFORMULARY Herbal Virility Max - take 2 tablets every 24 hr as needed for dietary supplement.      polyethylene glycol (MIRALAX) 17 GM/Dose powder Take 1 Capful by mouth daily as needed for constipation. Dissolved in 4-8 oz of beverage/drink      QUEtiapine (SEROQUEL) 200 MG tablet Take 400 mg by mouth at bedtime.      thiamine (B-1) 100 MG tablet Take 100 mg by mouth daily.      topiramate (TOPAMAX) 50 MG tablet Take 100 mg by mouth 2 times daily.      vitamin B-12 (CYANOCOBALAMIN) 1000 MCG tablet Take 1,000 mcg by mouth daily.       !! - Potential duplicate medications found.  Please discuss with provider.        Allergies   Allergies   Allergen Reactions    No Known Drug Allergy

## 2025-01-21 NOTE — PLAN OF CARE
"-diagnostic tests and consults completed and resulted - met  -vital signs normal or at patient baseline - met   BP (!) 127/106 (BP Location: Right arm)   Pulse 71   Temp 98.3  F (36.8  C) (Oral)   Resp 17   Ht 1.727 m (5' 8\")   Wt 92.4 kg (203 lb 13 oz)   SpO2 98%   BMI 30.99 kg/m      PRIMARY DIAGNOSIS: GENERALIZED WEAKNESS    OUTPATIENT/OBSERVATION GOALS TO BE MET BEFORE DISCHARGE  1. Orthostatic performed: N/A    2. Tolerating PO medications: Yes    3. Return to near baseline physical activity: Yes    4. Cleared for discharge by consultants (if involved): No    Discharge Planner Nurse   Safe discharge environment identified: No  Barriers to discharge: Yes       Entered by: Yesenia García RN 01/21/2025 4:05 PM      "

## 2025-01-21 NOTE — PLAN OF CARE
Goal Outcome Evaluation:       -diagnostic tests and consults completed and resulted: met   -vital signs normal or at patient baseline: met. Per pt feels like he is improving

## 2025-01-22 ENCOUNTER — APPOINTMENT (OUTPATIENT)
Dept: OCCUPATIONAL THERAPY | Facility: CLINIC | Age: 70
End: 2025-01-22
Payer: COMMERCIAL

## 2025-01-22 ENCOUNTER — HOSPITAL ENCOUNTER (OUTPATIENT)
Facility: CLINIC | Age: 70
End: 2025-01-22
Attending: HOSPITALIST | Admitting: HOSPITALIST

## 2025-01-22 VITALS
HEIGHT: 68 IN | WEIGHT: 203.81 LBS | OXYGEN SATURATION: 100 % | TEMPERATURE: 97.5 F | SYSTOLIC BLOOD PRESSURE: 142 MMHG | BODY MASS INDEX: 30.89 KG/M2 | DIASTOLIC BLOOD PRESSURE: 104 MMHG | RESPIRATION RATE: 16 BRPM | HEART RATE: 74 BPM

## 2025-01-22 DIAGNOSIS — I10 ESSENTIAL HYPERTENSION: Primary | ICD-10-CM

## 2025-01-22 DIAGNOSIS — F32.A DEPRESSION, UNSPECIFIED DEPRESSION TYPE: ICD-10-CM

## 2025-01-22 DIAGNOSIS — I25.5 ISCHEMIC CARDIOMYOPATHY: ICD-10-CM

## 2025-01-22 DIAGNOSIS — I21.4 NSTEMI (NON-ST ELEVATED MYOCARDIAL INFARCTION) (H): ICD-10-CM

## 2025-01-22 DIAGNOSIS — I48.91 ATRIAL FIBRILLATION (H): ICD-10-CM

## 2025-01-22 DIAGNOSIS — F41.1 ANXIETY STATE: ICD-10-CM

## 2025-01-22 LAB
ANION GAP SERPL CALCULATED.3IONS-SCNC: 11 MMOL/L (ref 7–15)
BUN SERPL-MCNC: 23.7 MG/DL (ref 8–23)
CALCIUM SERPL-MCNC: 9.7 MG/DL (ref 8.8–10.4)
CHLORIDE SERPL-SCNC: 108 MMOL/L (ref 98–107)
CREAT SERPL-MCNC: 0.9 MG/DL (ref 0.67–1.17)
EGFRCR SERPLBLD CKD-EPI 2021: >90 ML/MIN/1.73M2
ERYTHROCYTE [DISTWIDTH] IN BLOOD BY AUTOMATED COUNT: 16 % (ref 10–15)
GLUCOSE BLDC GLUCOMTR-MCNC: 134 MG/DL (ref 70–99)
GLUCOSE BLDC GLUCOMTR-MCNC: 80 MG/DL (ref 70–99)
GLUCOSE BLDC GLUCOMTR-MCNC: 86 MG/DL (ref 70–99)
GLUCOSE SERPL-MCNC: 89 MG/DL (ref 70–99)
HCO3 SERPL-SCNC: 19 MMOL/L (ref 22–29)
HCT VFR BLD AUTO: 43 % (ref 40–53)
HGB BLD-MCNC: 13.9 G/DL (ref 13.3–17.7)
MCH RBC QN AUTO: 29.1 PG (ref 26.5–33)
MCHC RBC AUTO-ENTMCNC: 32.3 G/DL (ref 31.5–36.5)
MCV RBC AUTO: 90 FL (ref 78–100)
PLATELET # BLD AUTO: 170 10E3/UL (ref 150–450)
POTASSIUM SERPL-SCNC: 5.1 MMOL/L (ref 3.4–5.3)
RBC # BLD AUTO: 4.78 10E6/UL (ref 4.4–5.9)
SODIUM SERPL-SCNC: 138 MMOL/L (ref 135–145)
WBC # BLD AUTO: 7 10E3/UL (ref 4–11)

## 2025-01-22 PROCEDURE — G0378 HOSPITAL OBSERVATION PER HR: HCPCS

## 2025-01-22 PROCEDURE — 80048 BASIC METABOLIC PNL TOTAL CA: CPT

## 2025-01-22 PROCEDURE — 99207 PR APP CREDIT; MD BILLING SHARED VISIT: CPT | Mod: FS | Performed by: STUDENT IN AN ORGANIZED HEALTH CARE EDUCATION/TRAINING PROGRAM

## 2025-01-22 PROCEDURE — 101N000002 HC CUSTODIAL CARE DAILY

## 2025-01-22 PROCEDURE — 97129 THER IVNTJ 1ST 15 MIN: CPT | Mod: GO

## 2025-01-22 PROCEDURE — 85041 AUTOMATED RBC COUNT: CPT

## 2025-01-22 PROCEDURE — 97535 SELF CARE MNGMENT TRAINING: CPT | Mod: GO

## 2025-01-22 PROCEDURE — 99207 PR NO BILLABLE SERVICE THIS VISIT: CPT

## 2025-01-22 PROCEDURE — 250N000013 HC RX MED GY IP 250 OP 250 PS 637

## 2025-01-22 PROCEDURE — 250N000013 HC RX MED GY IP 250 OP 250 PS 637: Performed by: STUDENT IN AN ORGANIZED HEALTH CARE EDUCATION/TRAINING PROGRAM

## 2025-01-22 PROCEDURE — 250N000013 HC RX MED GY IP 250 OP 250 PS 637: Performed by: HOSPITALIST

## 2025-01-22 PROCEDURE — 85014 HEMATOCRIT: CPT

## 2025-01-22 PROCEDURE — 99239 HOSP IP/OBS DSCHRG MGMT >30: CPT | Mod: FS

## 2025-01-22 PROCEDURE — 82962 GLUCOSE BLOOD TEST: CPT

## 2025-01-22 PROCEDURE — 97130 THER IVNTJ EA ADDL 15 MIN: CPT | Mod: GO

## 2025-01-22 PROCEDURE — 36415 COLL VENOUS BLD VENIPUNCTURE: CPT

## 2025-01-22 RX ORDER — METFORMIN HYDROCHLORIDE 500 MG/1
1000 TABLET, EXTENDED RELEASE ORAL
Status: DISCONTINUED | OUTPATIENT
Start: 2025-01-23 | End: 2025-01-28 | Stop reason: HOSPADM

## 2025-01-22 RX ORDER — LAMOTRIGINE 25 MG/1
50 TABLET ORAL DAILY
Status: DISCONTINUED | OUTPATIENT
Start: 2025-01-23 | End: 2025-01-28 | Stop reason: HOSPADM

## 2025-01-22 RX ORDER — TOPIRAMATE 100 MG/1
100 TABLET, FILM COATED ORAL 2 TIMES DAILY
Status: DISCONTINUED | OUTPATIENT
Start: 2025-01-22 | End: 2025-01-28 | Stop reason: HOSPADM

## 2025-01-22 RX ORDER — DEXTROSE MONOHYDRATE 25 G/50ML
25-50 INJECTION, SOLUTION INTRAVENOUS
Status: DISCONTINUED | OUTPATIENT
Start: 2025-01-22 | End: 2025-01-28 | Stop reason: HOSPADM

## 2025-01-22 RX ORDER — LISINOPRIL 5 MG/1
5 TABLET ORAL DAILY
Status: DISCONTINUED | OUTPATIENT
Start: 2025-01-23 | End: 2025-01-28 | Stop reason: HOSPADM

## 2025-01-22 RX ORDER — BUPROPION HYDROCHLORIDE 150 MG/1
150 TABLET ORAL EVERY MORNING
Status: DISCONTINUED | OUTPATIENT
Start: 2025-01-23 | End: 2025-01-22

## 2025-01-22 RX ORDER — GABAPENTIN 300 MG/1
300 CAPSULE ORAL 3 TIMES DAILY
Status: DISCONTINUED | OUTPATIENT
Start: 2025-01-22 | End: 2025-01-28 | Stop reason: HOSPADM

## 2025-01-22 RX ORDER — METOPROLOL SUCCINATE 50 MG/1
100 TABLET, EXTENDED RELEASE ORAL 2 TIMES DAILY
Status: DISCONTINUED | OUTPATIENT
Start: 2025-01-22 | End: 2025-01-28 | Stop reason: HOSPADM

## 2025-01-22 RX ORDER — NICOTINE POLACRILEX 4 MG
15-30 LOZENGE BUCCAL
Status: DISCONTINUED | OUTPATIENT
Start: 2025-01-22 | End: 2025-01-28 | Stop reason: HOSPADM

## 2025-01-22 RX ORDER — DULOXETIN HYDROCHLORIDE 60 MG/1
120 CAPSULE, DELAYED RELEASE ORAL DAILY
Status: DISCONTINUED | OUTPATIENT
Start: 2025-01-23 | End: 2025-01-28 | Stop reason: HOSPADM

## 2025-01-22 RX ORDER — QUETIAPINE FUMARATE 200 MG/1
400 TABLET, FILM COATED ORAL AT BEDTIME
Status: DISCONTINUED | OUTPATIENT
Start: 2025-01-22 | End: 2025-01-28 | Stop reason: HOSPADM

## 2025-01-22 RX ORDER — ATORVASTATIN CALCIUM 40 MG/1
40 TABLET, FILM COATED ORAL EVERY EVENING
Status: DISCONTINUED | OUTPATIENT
Start: 2025-01-22 | End: 2025-01-28 | Stop reason: HOSPADM

## 2025-01-22 RX ORDER — BUPROPION HYDROCHLORIDE 300 MG/1
300 TABLET ORAL EVERY MORNING
Status: DISCONTINUED | OUTPATIENT
Start: 2025-01-23 | End: 2025-01-22

## 2025-01-22 RX ADMIN — GABAPENTIN 300 MG: 300 CAPSULE ORAL at 18:31

## 2025-01-22 RX ADMIN — GABAPENTIN 300 MG: 300 CAPSULE ORAL at 10:41

## 2025-01-22 RX ADMIN — METOPROLOL SUCCINATE 100 MG: 50 TABLET, EXTENDED RELEASE ORAL at 10:42

## 2025-01-22 RX ADMIN — ATORVASTATIN CALCIUM 40 MG: 40 TABLET, FILM COATED ORAL at 22:01

## 2025-01-22 RX ADMIN — BUPROPION HYDROCHLORIDE 450 MG: 300 TABLET, EXTENDED RELEASE ORAL at 10:45

## 2025-01-22 RX ADMIN — DULOXETINE HYDROCHLORIDE 120 MG: 60 CAPSULE, DELAYED RELEASE ORAL at 10:42

## 2025-01-22 RX ADMIN — LISINOPRIL 5 MG: 5 TABLET ORAL at 10:40

## 2025-01-22 RX ADMIN — APIXABAN 5 MG: 5 TABLET, FILM COATED ORAL at 10:42

## 2025-01-22 RX ADMIN — METOPROLOL SUCCINATE 100 MG: 50 TABLET, EXTENDED RELEASE ORAL at 22:01

## 2025-01-22 RX ADMIN — QUETIAPINE FUMARATE 400 MG: 200 TABLET ORAL at 22:01

## 2025-01-22 RX ADMIN — TOPIRAMATE 100 MG: 100 TABLET, FILM COATED ORAL at 10:45

## 2025-01-22 RX ADMIN — TOPIRAMATE 100 MG: 100 TABLET, FILM COATED ORAL at 22:03

## 2025-01-22 RX ADMIN — APIXABAN 5 MG: 5 TABLET, FILM COATED ORAL at 22:01

## 2025-01-22 RX ADMIN — GABAPENTIN 300 MG: 300 CAPSULE ORAL at 22:01

## 2025-01-22 RX ADMIN — LAMOTRIGINE 50 MG: 25 TABLET ORAL at 10:46

## 2025-01-22 RX ADMIN — FOLIC ACID 1 MG: 1 TABLET ORAL at 10:41

## 2025-01-22 ASSESSMENT — ACTIVITIES OF DAILY LIVING (ADL)
ADLS_ACUITY_SCORE: 38
ADLS_ACUITY_SCORE: 38
ADLS_ACUITY_SCORE: 56
ADLS_ACUITY_SCORE: 56
ADLS_ACUITY_SCORE: 53
ADLS_ACUITY_SCORE: 53
ADLS_ACUITY_SCORE: 56
ADLS_ACUITY_SCORE: 53
ADLS_ACUITY_SCORE: 56
ADLS_ACUITY_SCORE: 56
ADLS_ACUITY_SCORE: 52
ADLS_ACUITY_SCORE: 56
ADLS_ACUITY_SCORE: 52
ADLS_ACUITY_SCORE: 56
ADLS_ACUITY_SCORE: 53
ADLS_ACUITY_SCORE: 38
ADLS_ACUITY_SCORE: 56
ADLS_ACUITY_SCORE: 56
ADLS_ACUITY_SCORE: 36
ADLS_ACUITY_SCORE: 56
ADLS_ACUITY_SCORE: 52
ADLS_ACUITY_SCORE: 56
ADLS_ACUITY_SCORE: 38

## 2025-01-22 NOTE — PLAN OF CARE
"PRIMARY DIAGNOSIS: GENERALIZED WEAKNESS    OUTPATIENT/OBSERVATION GOALS TO BE MET BEFORE DISCHARGE  1. Orthostatic performed: No    2. Tolerating PO medications: Yes    3. Return to near baseline physical activity: No    4. Cleared for discharge by consultants (if involved): No    Discharge Planner Nurse   Safe discharge environment identified: No  Barriers to discharge: Yes       Entered by: Marisabel Friedman RN 01/22/2025 12:29 PM     Please review provider order for any additional goals.   Nurse to notify provider when observation goals have been met and patient is ready for discharge.   Goal Outcome Evaluation:  -diagnostic tests and consults completed and resulted - In process  -vital signs normal or at patient baseline - Met  /80 (BP Location: Right arm)   Pulse 71   Temp 98.1  F (36.7  C) (Oral)   Resp 16   Ht 1.727 m (5' 8\")   Wt 92.4 kg (203 lb 13 oz)   SpO2 99%   BMI 30.99 kg/m      Nurse to notify provider when observation goals have been met and patient is ready for discharge   "

## 2025-01-22 NOTE — PLAN OF CARE
Goal Outcome Evaluation:       diagnostic tests and consults completed and resulted: met  -vital signs normal or at patient baseline: met

## 2025-01-22 NOTE — PLAN OF CARE
Occupational Therapy Discharge Summary    Reason for therapy discharge:    Discharged to home.    Progress towards therapy goal(s). See goals on Care Plan in [facility] electronic health record for goal details.  Goals partially met.  Barriers to achieving goals:   discharge from facility.    Therapy recommendation(s):    Continued therapy is recommended.  Rationale/Recommendations:  recommend continuation of HHPT and recommend assist with all IADLs d/t impaired cognition.

## 2025-01-22 NOTE — SIGNIFICANT EVENT
Fall:  found pt on floor by bedside. Writer assessed pt at bedside. Per pt he was sitting at the edge of bed and voided in urinal and dosed off. Pt stated he tucked and rolled and hit his head on the wall first then the ground. Pt denied pain or dizziness. Vitals signs stable. A x O 4. Charge nurse, NST, and primary nurse helped pt back into bed. New gown placed on pt. Provider aware and notified. Bed alarm on and pt education provided regarding call light.     Pt refused CT scan. Neuro checks q 2 hours x2 per prov order.

## 2025-01-22 NOTE — DISCHARGE SUMMARY
"[facility]  Hospitalist Discharge Summary      Date of Admission:  1/19/2025  Date of Discharge:  1/22/2025  2:34 PM  Discharging Provider: Tatum Maurice NP  Discharge Service: Hospitalist Service    Discharge Diagnoses   Weakness   Hx of left hip fracture s/p fall   HTN  Hx of ischemic cardiomyopathy   HFrEF   Hx CAD s/p PCI with LACHO to LAD and LCX  Atrial fibrillation  Type 2 DM  CKD stage 3  Bipolar disorder  Depression  Fall with head strike    Clinically Significant Risk Factors     # Obesity: Estimated body mass index is 30.99 kg/m  as calculated from the following:    Height as of this encounter: 1.727 m (5' 8\").    Weight as of this encounter: 92.4 kg (203 lb 13 oz).       Follow-ups Needed After Discharge   Follow-up Appointments       Adult [facility] Follow-up and recommended labs and tests      Follow up with primary care provider, DAVID GANT, within 7 days for hospital follow- up and to follow up on results.  No follow up labs or test are needed. Please discuss a potential referral to a cardiologist within your network at discussed on day of discharge.       Appointments on [place] and/or [facility] (with [facility] or [facility] provider or service). Call [phone] if you haven't heard regarding these appointments within 7 days of discharge.                Unresulted Labs Ordered in the Past 30 Days of this Admission       No orders found from 12/20/2024 to 1/20/2025.        These results will be followed up by Hospitalist Team     Discharge Disposition   Discharged to group home Care   Condition at discharge: Stable    Hospital Course   Obi Messer V is a 69 year old male admitted on 1/19/2025.  History of HFpEF, coronary artery disease, hypertension, chronic kidney disease, depression, dermatomyositis, bipolar disorder, atrial fibrillation & type 2 diabetes admitted for generalized weakness.      Weakness   Hx of left hip fracture s/p fall " "  Describes an episode where he \"lost control of my body both upper and lower extremities\" and was unable to ambulate without any other associated symptoms or known inciting illness.  No loss of consciousness.  At baseline utilizes a walker.  Vitally stable.  Labs without leukocytosis, normal procal and mildly elevated CRP.  EKG and troponin reassuring against cardiac event.  CT head without acute process.  Chest x-ray noted to have possible density in the left lower lung with? pneumonitis, clinically does not appear like overt pneumonia. COVID, flu A and B, and RSV negative. Initially appeared hypervolemic and was diuresed. He did have a hospitalization in April 2024 that began with weakness and failure to thrive. TSH, CK, troponin, and procal WNL. CRP elevated at 15.20 however unremarkable workup. MRI brain 1/20 with no acute intracranial pathology; moderate generalized volume loss and chronic small vessel ischemic disease. Echo 1/20 with mildly reduced LV function, EF 45-50%, inferior wall akinesis, RV function normal, no pericardial effusion. PT/OT consulted who recommended discharging to home with home PT.      HTN  Hx of ischemic cardiomyopathy   HFrEF   Hx CAD s/p PCI with LACHO to LAD and LCX  BNP elevated to 1300 (though higher in the past), bedside echo performed by emergency department physician demonstrated grossly diminished function. S/p 1L fluid followed by diuresis in the ED. Previous echo in April 2024 at OSH with EF 55-60%, normal LV function, normal RV function. Repeat echo on 1/20 with EF 45-50%, mildly reduced LV function, inferior wall akinesis, RV function normal, no pericardial effusion. Previously on lasix and spironolactone however does not take anymore. Discussed echo findings with Pedro Luis. Would prefer to follow up with his PCP at discharge to discuss options and potentially have a referral sent at that time within their health system. Continue PTA atorvastatin, lisinopril, metoprolol, and " eliquis.      Atrial fibrillation  Continue PTA metoprolol 100 mg BID and eliquis 5 mg BID      Type 2 DM  HgbA1c 6.2% on 1/19/2025. PTA metformin held while inpatient; okay to resume as OP.      CKD stage 3  Creatinine 0.99 on admission, appears stable per chart review.      Bipolar disorder  Depression  Continue PTA seroquel 400 mg at bedtime, duloxetine 120 mg daily, bupropion  mg daily, and lamotrigine 50 mg daily      Fall with head strike  Morning of 1/22, patient was using urinal on EOB when he dozed off. Patient said that he tucked and rolled, hitting his head on the wall and then the ground. Denied pain or dizziness, vitals WNL, A&O x4. Advised for CT scan as he is on blood thinners however patient refused. Neuro checks stable.     Consultations This Hospital Stay   NURSING TO CONSULT FOR VASCULAR ACCESS CARE IP CONSULT  PHARMACY TO DOSE WARFARIN  PHYSICAL THERAPY ADULT IP CONSULT  OCCUPATIONAL THERAPY ADULT IP CONSULT  MEDICATION HISTORY IP PHARMACY CONSULT  CARE MANAGEMENT / SOCIAL WORK IP CONSULT  PHYSICAL THERAPY ADULT IP CONSULT  OCCUPATIONAL THERAPY ADULT IP CONSULT  CARE MANAGEMENT / SOCIAL WORK IP CONSULT    Code Status   No CPR- Do NOT Intubate    Time Spent on this Encounter   ITatum NP, personally saw the patient today and spent greater than 30 minutes discharging this patient.       Tatum Maurice NP  [facility] [address] OBSERVATION  [address] [facility] [address]  Phone: [phone]  Fax: [phone]  ______________________________________________________________________    Physical Exam   Vital Signs: Temp: 97.5  F (36.4  C) Temp src: Oral BP: (!) 142/104 Pulse: 74   Resp: 16 SpO2: 100 % O2 Device: None (Room air)    Weight: 203 lbs 13.04 oz    GENERAL: Alert and awake. Answering questions appropriately. Oriented x 3. NAD. Pleasant and conversational   HEENT: Anicteric sclera. EOMI. Mucous membranes moist   CARDIOVASCULAR: RRR. S1, S2.  No murmurs, rubs, or gallops.   RESPIRATORY: Effort normal on RA. Clear to auscultation bilaterally, no rales, rhonchi or wheezes  GI: Abdomen soft, non-tender abdomen without rebound or guarding, normoactive bowel sounds present  MUSCULOSKELETAL: Moves all extremities.   EXTREMITIES: No peripheral edema. No calf asymmetry, erythema, or tenderness.   NEUROLOGICAL: No focal deficits. Moving all extremities symmetrically.   SKIN: Intact. Warm and dry. No jaundice. No rashes on exposed skin  PSYCH: Affect appropriate        Primary Care Physician   DAVID GANT    Discharge Orders      Reason for your hospital stay    You presented to the ED for generalized weakness and difficulty with walking. Large workup was completed including labs and CT scan which was unremarkable. PT/OT worked with you and determined you were safe to return to your facility with resumption of physical therapy.     Activity    Your activity upon discharge: activity as tolerated     Adult [facility] Follow-up and recommended labs and tests    Follow up with primary care provider, DAVID GANT, within 7 days for hospital follow- up and to follow up on results.  No follow up labs or test are needed. Please discuss a potential referral to a cardiologist within your network at discussed on day of discharge.       Appointments on [place] and/or [facility] (with [facility] or [facility] provider or service). Call [phone] if you haven't heard regarding these appointments within 7 days of discharge.     Resume Home Care Services     Diet    Follow this diet upon discharge: Current Diet:Orders Placed This Encounter      2 Gram Sodium Diet       Significant Results and Procedures   Most Recent 3 CBC's:  Recent Labs   Lab Test 01/22/25  0622 01/21/25  1030 01/20/25  0543   WBC 7.0 10.4 8.1   HGB 13.9 14.5 13.3   MCV 90 88 89    192 174     Most Recent 3 BMP's:  Recent Labs   Lab Test 01/22/25  1153 01/22/25  0622 01/21/25  0945 01/21/25  0605  01/20/25  0907 01/20/25  0543   NA  --  138  --  139  --  140   POTASSIUM  --  5.1  --  4.4  --  3.6   CHLORIDE  --  108*  --  109*  --  108*   CO2  --  19*  --  22  --  21*   BUN  --  23.7*  --  23.1*  --  20.0   CR  --  0.90  --  1.04  --  0.94   ANIONGAP  --  11  --  8  --  11   CHUCK  --  9.7  --  9.2  --  9.2   GLC 80 89 88 100*   < > 83    < > = values in this interval not displayed.   ,   Results for orders placed or performed during the hospital encounter of 01/19/25   CT Head w/o Contrast    Narrative    EXAM: CT HEAD W/O CONTRAST  LOCATION: [facility]  DATE: 1/19/2025    INDICATION: AMS  COMPARISON: 12/18/2024  TECHNIQUE: Routine CT Head without IV contrast. Multiplanar reformats. Dose reduction techniques were used.    FINDINGS:  INTRACRANIAL CONTENTS: No intracranial hemorrhage, extraaxial collection, or mass effect.  No CT evidence of acute infarct. Mild to moderate presumed chronic small vessel ischemic changes. Mild to moderate generalized volume loss. No hydrocephalus.     VISUALIZED ORBITS/SINUSES/MASTOIDS: No intraorbital abnormality. No paranasal sinus mucosal disease. No middle ear or mastoid effusion.    BONES/SOFT TISSUES: No acute abnormality.      Impression    IMPRESSION:  1.  No acute intracranial process.     XR Chest 2 Views    Narrative    EXAM: XR CHEST 2 VIEWS  LOCATION: [facility]  DATE: 1/19/2025    INDICATION: Weakness.  COMPARISON: 6/4/2024.      Impression    IMPRESSION: Heart size and pulmonary vascularity within normal limits. Mild hazy increased density left lower lung with a mild or low-grade pneumonitis not excluded. Clinical correlation. Right lung is clear. Aortic calcification. Degenerative   postoperative changes in the spine. Otherwise unremarkable.   POC US ECHO LIMITED    Impression    Bedside, focused cardiac ultrasound performed and interpreted by me.    Indication: Eval global  function    Parasternal long, parasternal short, apical four-chamber views were acquired for gross evaluation of global cardiac function, Gross RV strain and pericardial effusion.  Image quality was satisfactory. Global left ventricular function appears grossly diminished.  The LV chamber appears larger than the RV chamber.  There is biatrial enlargement.  There is no evidence of free fluid within the pericardium.     Impression: Bedside limited cardiac ultrasound showing grossly diminished left ventricular function. No gross evidence for right ventricular strain.   There is no pericardial effusion.  Atrial enlargement      Bedside focused lung ultrasound performed and interpreted by me.     Indication: Eval for evidence of pulmonary edema     Patient position: semirecumbent    Windows: apical fields, lateral fields.     Findings:    Predominance  Impression: pulmonary edema          MR Brain w/o Contrast    Narrative    MR BRAIN W/O CONTRAST 1/20/2025 10:18 AM    Provided History: reported weakness, R>L, eval CVA  ICD-10:    Comparison:  Head CT 1/19/2025     Technique: Sagittal T1-weighted, coronal T2-weighted, axial T2 FLAIR,  axial susceptibility weighted, and axial diffusion-weighted with ADC  map images of the brain were obtained without intravenous contrast.    Findings: No intracranial mass lesion, mass effect, midline shift, or  abnormal fluid collection. Moderate generalized volume loss with  enlarged right precentral sulcus and bilateral temporal sulci and  enlarged lateral ventricles. Moderate chronic small vessel ischemic  changes. Subcortical T2 hyperintensity and suspected cortical loss in  the posterior right middle frontal gyrus No abnormality of reduced  diffusion.  Normal intravascular flow voids.       Impression    Impression: No acute intracranial pathology. Moderate generalized  volume loss and chronic small vessel ischemic disease.    I have personally reviewed the examination and initial  interpretation  and I agree with the findings.    LEONARD LEOEN MD         SYSTEM ID:  [id number]   Echo Complete     Value    LVEF  45-50% (mildly reduced)    Narrative    [id number]  [id number]  [id number]  [id number]^ERI^REJI^NICK     [facility]  Echocardiography Laboratory  [address] [place] [id number]     Name: GLORIA MARTIN V  MRN: [id number]  : 1955  Study Date: 2025 11:27 AM  Age: 69 yrs  Gender: Male  Patient Location: [facility]  Reason For Study: Dyspnea  Ordering Physician: REJI HWANG  Performed By: Elfego Fernando RDCS     BSA: 2.3 m2  Height: 68 in  Weight: 272 lb  HR: 80  BP: 147/96 mmHg  ______________________________________________________________________________  Procedure  Echocardiogram with two-dimensional, color and spectral Doppler.  ______________________________________________________________________________  Interpretation Summary  Left ventricular size is normal.  Left ventricular function is decreased. The ejection fraction is 45-50%  (mildly reduced).  Inferior wall akinesis is present.  Right ventricular function, chamber size, wall motion, and thickness are  normal.  Pulmonary artery systolic pressure cannot be assessed.  The inferior vena cava is normal.  No pericardial effusion is present.  ______________________________________________________________________________  Left Ventricle  Left ventricular size is normal. Left ventricular wall thickness is normal.  Left ventricular function is decreased. The ejection fraction is 45-50%  (mildly reduced). Diastolic function not assessed due to arrhythmia. Inferior  wall akinesis is present.     Right Ventricle  Right ventricular function, chamber size, wall motion, and thickness are  normal.     Atria  The right atria appears normal. Mild left atrial enlargement is present. The  atrial septum is intact as assessed by color Doppler .     Mitral Valve  The mitral valve is  normal. Trace to mild mitral insufficiency is present.     Aortic Valve  Aortic valve sclerosis is present.     Tricuspid Valve  The tricuspid valve is normal. Trace tricuspid insufficiency is present.  Pulmonary artery systolic pressure cannot be assessed.     Pulmonic Valve  The pulmonic valve is normal.     Vessels  The thoracic aorta is normal. The pulmonary artery is normal. The inferior  vena cava is normal.     Pericardium  No pericardial effusion is present.     ______________________________________________________________________________  MMode/2D Measurements & Calculations  IVSd: 1.1 cm  LVIDd: 5.4 cm  LVIDs: 4.3 cm  LVPWd: 1.2 cm  FS: 20.2 %     LV mass(C)d: 257.0 grams  LV mass(C)dI: 110.4 grams/m2  Ao root diam: 3.3 cm  asc Aorta Diam: 3.5 cm  LVOT diam: 2.2 cm  LVOT area: 3.8 cm2     EF(MOD-bp): 48.5 %  Ao root diam index Ht(cm/m): 1.9  Ao root diam index BSA (cm/m2): 1.4  Asc Ao diam index BSA (cm/m2): 1.5  Asc Ao diam index Ht(cm/m): 2.0  LA Volume (BP): 85.1 ml     LA Volume Index (BP): 36.5 ml/m2  RV Base: 4.3 cm  RWT: 0.46  TAPSE: 2.0 cm     Doppler Measurements & Calculations  MV E max slick: 106.0 cm/sec  MV dec slope: 546.6 cm/sec2  MV dec time: 0.19 sec  PA acc time: 0.09 sec  E/E' av.6  Lateral E/e': 5.8  Medial E/e': 11.3  RV S Slick: 11.0 cm/sec     ______________________________________________________________________________  Report approved by: TIAGO Cuellar MD on 2025 12:38 PM             Discharge Medications   Discharge Medication List as of 2025  2:34 PM        CONTINUE these medications which have NOT CHANGED    Details   acetaminophen (TYLENOL) 500 MG tablet Take 1,000 mg by mouth 3 times daily., Historical      apixaban ANTICOAGULANT (ELIQUIS) 5 MG tablet Take 5 mg by mouth 2 times daily., Historical      aspirin EC 81 MG EC tablet Take 1 tablet by mouth daily., 81 mg, Oral, DAILY Starting 2013, Until Discontinued, Disp-100 tablet, R-5, Fax      atorvastatin  (LIPITOR) 40 MG tablet Take 40 mg by mouth every evening., Historical      !! buPROPion (WELLBUTRIN XL) 150 MG 24 hr tablet Take 150 mg by mouth every morning. Take with the 300 mg tablet for total morning dose of 450 mg, Historical      !! buPROPion (WELLBUTRIN XL) 300 MG 24 hr tablet Take 300 mg by mouth every morning. Take with the 150 mg tablet for total morning dose of 450 mg, Historical      carboxymethylcellulose PF (REFRESH PLUS) 0.5 % ophthalmic solution Place 1 drop into both eyes every hour as needed for dry eyes., Historical      cetirizine (ZYRTEC) 10 MG tablet Take 10 mg by mouth daily., Historical      clotrimazole (LOTRIMIN) 1 % external cream Apply topically daily as needed (groin rash).Historical      Dextromethorphan-guaiFENesin  MG/20ML LIQD Take 10 mLs by mouth every 4 hours as needed (cough)., Historical      diclofenac (VOLTAREN) 1 % topical gel Apply 2 g topically every 6 hours as needed for moderate pain (knees)., Historical      DULoxetine (CYMBALTA) 60 MG capsule Take 120 mg by mouth daily., Historical      folic acid (FOLVITE) 1 MG tablet Take 1 mg by mouth daily., Historical      gabapentin (NEURONTIN) 100 MG capsule Take 300 mg by mouth 3 times daily., Historical      lamoTRIgine (LAMICTAL) 25 MG tablet Take 50 mg by mouth daily., Historical      lisinopril (PRINIVIL,ZESTRIL) 5 MG tablet Take 1 tablet by mouth 2 times daily., 5 mg, Oral, 2 TIMES DAILY Starting 5/13/2013, Until Discontinued, Disp-90 tablet, R-5, E-Prescribe      loperamide (IMODIUM) 1 MG/7.5ML liquid Take 2 mg by mouth 4 times daily as needed for diarrhea., Historical      metFORMIN (GLUCOPHAGE XR) 500 MG 24 hr tablet Take 1,000 mg by mouth daily (with breakfast)., Historical      methocarbamol (ROBAXIN) 500 MG tablet Take 500 mg by mouth 3 times daily as needed for muscle spasms., Historical      metoprolol succinate ER (TOPROL XL) 100 MG 24 hr tablet Take 100 mg by mouth 2 times daily., Historical      !!  NONFORMULARY PROSTATE X PLUS -  take 1 capsule every 24 hr as needed for prostate supplement., Historical      !! NONFORMULARY Herbal Virility Max - take 2 tablets every 24 hr as needed for dietary supplement., Historical      polyethylene glycol (MIRALAX) 17 GM/Dose powder Take 1 Capful by mouth daily as needed for constipation. Dissolved in 4-8 oz of beverage/drink, Historical      QUEtiapine (SEROQUEL) 200 MG tablet Take 400 mg by mouth at bedtime., Historical      thiamine (B-1) 100 MG tablet Take 100 mg by mouth daily., Historical      topiramate (TOPAMAX) 50 MG tablet Take 100 mg by mouth 2 times daily., Historical      vitamin B-12 (CYANOCOBALAMIN) 1000 MCG tablet Take 1,000 mcg by mouth daily., Historical       !! - Potential duplicate medications found. Please discuss with provider.        Allergies   Allergies   Allergen Reactions    No Known Drug Allergy

## 2025-01-22 NOTE — PLAN OF CARE
Goal Outcome Evaluation:       -diagnostic tests and consults completed and resulted: met  -vital signs normal or at patient baseline: met

## 2025-01-22 NOTE — H&P
[facility]    History and Physical - Hospitalist Service       Date of Admission:  1/22/2025    Assessment & Plan   Obi Messer V is a 69 year old male being transitioned to correction care on 1/22/2025. Please see the encounters from the same date for more details of her presentation and workup.    jail Care Admission  - social work consulted, appreciate assistance     Weakness, Hx of left hip fracture s/p fall: PT/OT rec discharge to home with home PT  HTN, hx of ischemic cardiomyopathy, HFrEF, Hx CAD s/p PCI with LACHO to LAD and LCX: continue PTA atorvastatin, lisinopril, metoprolol, and eliquis  Atrial fibrillation: continue PTA metoprolol 100 mg BID and eliquis 5 mg BID   Type 2 DM: continue PTA metformin   CKD stage 3: stable during prior admission  Bipolar disorder, depression: continue PTA seroquel 400 mg at bedtime, duloxetine 120 mg daily, bupropion  mg daily, and lamotrigine 50 mg daily        Diet: Combination Diet Regular Diet      Expected Discharge: working on discharge plans with care coordination  Disposition difficulty: lack of housing and support systems     Tatum Maurice NP  Hospitalist Service  [facility]  Securely message with [employer] (more info)  Text page via [facility] Paging/Directory     ______________________________________________________________________    Chief Complaint   Transitioning to correction care    History of Present Illness   Obi Messer V is a 69 year old male who is being transitioned to correction care.  Please see the other encounters from the same date for more details; a brief summary of her current symptoms and situation is included here.    Prior to Admission Medications   Prior to Admission Medications   Prescriptions Last Dose Informant Patient Reported? Taking?   DULoxetine (CYMBALTA) 60 MG capsule   Yes No   Sig: Take 120 mg by mouth daily.    Dextromethorphan-guaiFENesin  MG/20ML LIQD   Yes No   Sig: Take 10 mLs by mouth every 4 hours as needed (cough).   NONFORMULARY   Yes No   Sig: PROSTATE X PLUS -  take 1 capsule every 24 hr as needed for prostate supplement.   NONFORMULARY   Yes No   Sig: Herbal Virility Max - take 2 tablets every 24 hr as needed for dietary supplement.   QUEtiapine (SEROQUEL) 200 MG tablet   Yes No   Sig: Take 400 mg by mouth at bedtime.   acetaminophen (TYLENOL) 500 MG tablet   Yes No   Sig: Take 1,000 mg by mouth 3 times daily.   apixaban ANTICOAGULANT (ELIQUIS) 5 MG tablet   Yes No   Sig: Take 5 mg by mouth 2 times daily.   aspirin EC 81 MG EC tablet   No No   Sig: Take 1 tablet by mouth daily.   atorvastatin (LIPITOR) 40 MG tablet   Yes No   Sig: Take 40 mg by mouth every evening.   buPROPion (WELLBUTRIN XL) 150 MG 24 hr tablet   Yes No   Sig: Take 150 mg by mouth every morning. Take with the 300 mg tablet for total morning dose of 450 mg   buPROPion (WELLBUTRIN XL) 300 MG 24 hr tablet   Yes No   Sig: Take 300 mg by mouth every morning. Take with the 150 mg tablet for total morning dose of 450 mg   carboxymethylcellulose PF (REFRESH PLUS) 0.5 % ophthalmic solution   Yes No   Sig: Place 1 drop into both eyes every hour as needed for dry eyes.   cetirizine (ZYRTEC) 10 MG tablet   Yes No   Sig: Take 10 mg by mouth daily.   clotrimazole (LOTRIMIN) 1 % external cream   Yes No   Sig: Apply topically daily as needed (groin rash).   diclofenac (VOLTAREN) 1 % topical gel   Yes No   Sig: Apply 2 g topically every 6 hours as needed for moderate pain (knees).   folic acid (FOLVITE) 1 MG tablet   Yes No   Sig: Take 1 mg by mouth daily.   gabapentin (NEURONTIN) 100 MG capsule   Yes No   Sig: Take 300 mg by mouth 3 times daily.   lamoTRIgine (LAMICTAL) 25 MG tablet   Yes No   Sig: Take 50 mg by mouth daily.   lisinopril (PRINIVIL,ZESTRIL) 5 MG tablet   No No   Sig: Take 1 tablet by mouth 2 times daily.   Patient taking differently:  "Take 5 mg by mouth daily.   loperamide (IMODIUM) 1 MG/7.5ML liquid   Yes No   Sig: Take 2 mg by mouth 4 times daily as needed for diarrhea.   metFORMIN (GLUCOPHAGE XR) 500 MG 24 hr tablet   Yes No   Sig: Take 1,000 mg by mouth daily (with breakfast).   methocarbamol (ROBAXIN) 500 MG tablet   Yes No   Sig: Take 500 mg by mouth 3 times daily as needed for muscle spasms.   metoprolol succinate ER (TOPROL XL) 100 MG 24 hr tablet   Yes No   Sig: Take 100 mg by mouth 2 times daily.   polyethylene glycol (MIRALAX) 17 GM/Dose powder   Yes No   Sig: Take 1 Capful by mouth daily as needed for constipation. Dissolved in 4-8 oz of beverage/drink   thiamine (B-1) 100 MG tablet   Yes No   Sig: Take 100 mg by mouth daily.   topiramate (TOPAMAX) 50 MG tablet   Yes No   Sig: Take 100 mg by mouth 2 times daily.   vitamin B-12 (CYANOCOBALAMIN) 1000 MCG tablet   Yes No   Sig: Take 1,000 mcg by mouth daily.      Facility-Administered Medications: None        Physical Exam   Vital Signs: Temp: 98.1  F (36.7  C) Temp src: Oral BP: (!) 147/91 Pulse: 88   Resp: 16 SpO2: 100 % O2 Device: None (Room air)    Weight: 0 lbs 0 oz    Physical exam deferred given long term status          Diet: Combination Diet Regular Diet    DVT Prophylaxis: DOAC  Reis Catheter: Not present  Lines: None     Cardiac Monitoring: None  Code Status: No CPR- Do NOT Intubate      Clinically Significant Risk Factors Present on Admission          # Hyperchloremia: Highest Cl = 109 mmol/L in last 2 days, will monitor as appropriate           # Drug Induced Coagulation Defect: home medication list includes an anticoagulant medication  # Drug Induced Platelet Defect: home medication list includes an antiplatelet medication   # Hypertension: Noted on problem list           # Obesity: Estimated body mass index is 30.99 kg/m  as calculated from the following:    Height as of 1/20/25: 1.727 m (5' 8\").    Weight as of 1/20/25: 92.4 kg (203 lb 13 oz).       # " Financial/Environmental Concerns:           Disposition Plan     Medically Ready for Discharge: Ready Now         The patient's care was discussed with the Attending Physician, Dr. Cheney, Bedside Nurse, Care Coordinator/, and Patient.    Tatum Maurice NP  Hospitalist Service  [facility]  Securely message with [employer] (more info)  Text page via [facility] Paging/Directory     ______________________________________________________________________

## 2025-01-22 NOTE — UTILIZATION REVIEW
Concurrent stay review; Secondary Review Determination - [facility]        Under the authority of the Utilization Management Committee, the utilization review process indicated a secondary review on the above patient.  The review outcome is based on review of the medical records, discussions with staff, and applying clinical experience noted on the date of the review.        (x) Outpatient senior living status is appropriate       RATIONALE FOR DETERMINATION:     Patient delayed discharge is related to disposition, there is no medical necessity for inpatient admission at the time of this review. If there is a change in patient status, please resend for review.    The information on this document is developed by the utilization review team in order for the business office to ensure compliance.  This only denotes the appropriateness of proper admission status and does not reflect the quality of care rendered.       The definitions of Inpatient Status and Observation Status used in making the determination above are those provided in the CMS Coverage Manual, Chapter 1 and Chapter 6, section 70.4.       Sincerely,    Reid Malcolm MD

## 2025-01-22 NOTE — PLAN OF CARE
"PRIMARY DIAGNOSIS: GENERALIZED WEAKNESS    OUTPATIENT/OBSERVATION GOALS TO BE MET BEFORE DISCHARGE  1. Orthostatic performed: No    2. Tolerating PO medications: Yes    3. Return to near baseline physical activity: No    4. Cleared for discharge by consultants (if involved): No    Discharge Planner Nurse   Safe discharge environment identified: No  Barriers to discharge: Yes       Entered by: Marisabel Friedman RN 01/22/2025 12:33 PM     Please review provider order for any additional goals.   Nurse to notify provider when observation goals have been met and patient is ready for discharge.   Goal Outcome Evaluation:  -diagnostic tests and consults completed and resulted - In process  -vital signs normal or at patient baseline - Met  BP (!) 142/104   Pulse 74   Temp 97.5  F (36.4  C) (Oral)   Resp 16   Ht 1.727 m (5' 8\")   Wt 92.4 kg (203 lb 13 oz)   SpO2 100%   BMI 30.99 kg/m      Nurse to notify provider when observation goals have been met and patient is ready for discharge   "

## 2025-01-22 NOTE — PROGRESS NOTES
"[facility]    Medicine Progress Note - Hospitalist Service    Date of Admission:  1/19/2025    Assessment & Plan   Obi Messer V is a 69 year old male admitted on 1/19/2025.  History of HFpEF, coronary artery disease, hypertension, chronic kidney disease, depression, dermatomyositis, bipolar disorder, atrial fibrillation & type 2 diabetes admitted for generalized weakness.     Updates Today:  - fall earlier this morning after falling asleep while using urinal on EOB; see notes for further details   - continue working with SW/RNCC for dispo plan   - continues to be neurologically intact following fall     Weakness   Hx of left hip fracture s/p fall   Describes an episode where he \"lost control of my body both upper and lower extremities\" and was unable to ambulate without any other associated symptoms or known inciting illness.  No loss of consciousness.  At baseline utilizes a walker.  Vitally stable.  Labs without leukocytosis, normal procal and mildly elevated CRP.  EKG and troponin reassuring against cardiac event.  CT head without acute process.  Chest x-ray noted to have possible density in the left lower lung with? pneumonitis, clinically does not appear like overt pneumonia. COVID, flu A and B, and RSV negative. Initially appeared hypervolemic and was diuresed. He did have a hospitalization in April 2024 that began with weakness and failure to thrive. TSH, CK, troponin, and procal WNL. CRP elevated at 15.20 however unremarkable workup. MRI brain 1/20 with no acute intracranial pathology; moderate generalized volume loss and chronic small vessel ischemic disease. Echo 1/20 with mildly reduced LV function, EF 45-50%, inferior wall akinesis, RV function normal, no pericardial effusion.   - PT/OT consulted, appreciate assistance    - home with assist and home PT   - if fevers, would culture and start antibiotics      HTN  Hx of ischemic cardiomyopathy   HFrEF   Hx " CAD s/p PCI with LACHO to LAD and LCX  BNP elevated to 1300 (though higher in the past), bedside echo performed by emergency department physician demonstrated grossly diminished function. S/p 1L fluid followed by diuresis in the ED. Previous echo in April 2024 at OSH with EF 55-60%, normal LV function, normal RV function. Repeat echo on 1/20 with EF 45-50%, mildly reduced LV function, inferior wall akinesis, RV function normal, no pericardial effusion. Previously on lasix and spironolactone however does not take anymore. Discussed echo findings with Pedro Luis. Would prefer to follow up with his PCP at discharge to discuss options and potentially have a referral sent at that time within their health system   - continue PTA atorvastatin 40 mg daily   - continue PTA lisinopril 5 mg daily  - continue PTA metoprolol 100 mg BID  - continue PTA eliquis 5 mg BID      Atrial fibrillation  - continue PTA metoprolol 100 mg BID  - continue PTA eliquis 5 mg BID      Type 2 DM  HgbA1c 6.2% on 1/19/2025.   - hold PTA metformin ER 1000 mg daily  while in the hospital  - POCT glucose checks   - no sliding scale insulin at this time      CKD stage 3  Creatinine 0.99 on admission, appears stable per chart review.   - continue to monitor; renally dose medications; avoid nephrotoxic meds when able      Bipolar disorder  Depression  - continue PTA seroquel 400 mg at bedtime, duloxetine 120 mg daily, bupropion  mg daily, and lamotrigine 50 mg daily     Fall with head strike  Morning of 1/22, patient was using urinal on EOB when he dozed off. Patient said that he tucked and rolled, hitting his head on the wall and then the ground. Denied pain or dizziness, vitals WNL, A&O x4. Advised for CT scan as he is on blood thinners however patient refused.   - continue neuro checks q2h x2        Observation Goals: -diagnostic tests and consults completed and resulted, -vital signs normal or at patient baseline, Nurse to notify provider when  "observation goals have been met and patient is ready for discharge.  Diet: 2 Gram Sodium Diet  Diet    DVT Prophylaxis: DOAC  Reis Catheter: Not present  Lines: None     Cardiac Monitoring: None  Code Status: No CPR- Do NOT Intubate      Clinically Significant Risk Factors Present on Admission          # Hyperchloremia: Highest Cl = 109 mmol/L in last 2 days, will monitor as appropriate           # Drug Induced Coagulation Defect: home medication list includes an anticoagulant medication  # Drug Induced Platelet Defect: home medication list includes an antiplatelet medication   # Hypertension: Noted on problem list           # Obesity: Estimated body mass index is 30.99 kg/m  as calculated from the following:    Height as of this encounter: 1.727 m (5' 8\").    Weight as of this encounter: 92.4 kg (203 lb 13 oz).       # Financial/Environmental Concerns: other (see comments) (Pt shared his social security is the only money he gets and it is not enough.)         Social Drivers of Health    Food Insecurity: Food Insecurity Present (4/3/2024)    [facility]    Food Insecurity     Do you worry your food will run out before you are able to buy more?: 2   Housing Stability: High Risk (4/3/2024)    Received from [employer]    Housing Stability     What is your housing situation today?: 3   Tobacco Use: High Risk (7/11/2021)    Patient History     Smoking Tobacco Use: Former     Smokeless Tobacco Use: Current   Social Connections: Socially Isolated (4/3/2024)    Received from [employer]    Social Connections     Do you often feel lonely or isolated from those around you?: 4          Disposition Plan     Medically Ready for Discharge: Ready Now; Working on safe disposition plan            The patient's care was discussed with the Attending Physician, Dr. Cheney and Patient.    Tatum Maurice, " NP  Hospitalist Service  [facility]  Securely message with [employer] (more info)  Text page via [facility] Paging/Directory   ______________________________________________________________________    Interval History   Pedro Luis was seen resting in bed. No acute events overnight however did experience a fall this AM. Was sitting on EOB using the urinal when he fell asleep, causing him to roll out of bed and hit his head against the fall and floor. Denies headache, blurry vision, or any other acute neurologic symptoms. Discuss plan to continue working with RNCC/SW for a safe discharge plan. All questions and concerns addressed at this time.     Physical Exam   Vital Signs: Temp: 98.1  F (36.7  C) Temp src: Oral BP: 134/80 Pulse: 71   Resp: 16 SpO2: 99 % O2 Device: None (Room air)    Weight: 203 lbs 13.04 oz    GENERAL: Alert and awake. Answering questions appropriately. Oriented x 3. NAD. Pleasant and conversational   HEENT: Anicteric sclera. EOMI. Mucous membranes moist   CARDIOVASCULAR: RRR. S1, S2. No murmurs, rubs, or gallops.   RESPIRATORY: Effort normal on RA. Clear to auscultation bilaterally, no rales, rhonchi or wheezes  GI: Abdomen soft, non-tender abdomen without rebound or guarding, normoactive bowel sounds present  MUSCULOSKELETAL: Moves all extremities.   EXTREMITIES: No peripheral edema. No calf asymmetry, erythema, or tenderness.   NEUROLOGICAL: No focal deficits. Moving all extremities symmetrically.   SKIN: Intact. Warm and dry. No jaundice. No rashes on exposed skin  PSYCH: Affect appropriate     Medical Decision Making       30 MINUTES SPENT BY ME on the date of service doing chart review, history, exam, documentation & further activities per the note.      Data   Imaging results reviewed over the past 24 hrs:   No results found for this or any previous visit (from the past 24 hours).  Recent Labs   Lab 01/22/25  0622 01/21/25  1030 01/21/25  0945 01/21/25  0605  01/20/25  1735 01/20/25  0907 01/20/25  0543 01/19/25 2113 01/19/25 2110   WBC 7.0 10.4  --   --   --   --  8.1  --  9.0   HGB 13.9 14.5  --   --   --   --  13.3  --  12.5*   MCV 90 88  --   --   --   --  89  --  90    192  --   --   --   --  174  --  176   INR  --  1.04  --   --   --   --  1.18* 1.26*  --    NA  --   --   --  139  --   --  140  --  137   POTASSIUM  --   --   --  4.4  --   --  3.6  --  4.3   CHLORIDE  --   --   --  109*  --   --  108*  --  107   CO2  --   --   --  22  --   --  21*  --  20*   BUN  --   --   --  23.1*  --   --  20.0  --  23.6*   CR  --   --   --  1.04  --   --  0.94  --  0.99   ANIONGAP  --   --   --  8  --   --  11  --  10   CHUCK  --   --   --  9.2  --   --  9.2  --  8.9   GLC  --   --  88 100* 166*   < > 83  --  125*   ALBUMIN  --   --   --   --   --   --   --   --  3.9   PROTTOTAL  --   --   --   --   --   --   --   --  7.0   BILITOTAL  --   --   --   --   --   --   --   --  0.3   ALKPHOS  --   --   --   --   --   --   --   --  111   ALT  --   --   --   --   --   --   --   --  18   AST  --   --   --   --   --   --   --   --  21    < > = values in this interval not displayed.

## 2025-01-22 NOTE — PROVIDER NOTIFICATION
Provider notified - patient refused his lab for INR. He might be willing to do a finger stick lab if that's available through lab.

## 2025-01-22 NOTE — PROGRESS NOTES
0600- paged for unwitnessed fall    Patient seen at bedside. He states he was using the urinal at bedside and fell asleep, falling forward. He did hit his head on the wall. Denies chest pain, shortness of breath or dizziness prior to fall. No injuries noted, he is on apixaban. Patient advised for CT scan of the head due to hitting his head on blood thinners. He refused imaging. He is alert and oriented and states that he is his own decision maker. No family available to contact.    Plan with nursing staff present  - Patient has refused head CT  - Neuro checks every 2 hours X 2    Patient and nurse in agreement with plan      Susanne Randle, CNP

## 2025-01-22 NOTE — PHARMACY-ADMISSION MEDICATION HISTORY
Pharmacist Admission Medication History    Admission medication history completed 1/20/25 by Guevara Stanley.

## 2025-01-22 NOTE — CARE PLAN
01/22/25 0600   Fall Event   Patient Assessed By nurse;provider   Name of Provider Notified Susanne Randle   Family/Designated Caregiver Notified of Fall No   Fall Prevention Plan Updated Yes   Reason Family/Designated Caregiver Not Notified no known family per pt and chart

## 2025-01-23 VITALS
DIASTOLIC BLOOD PRESSURE: 65 MMHG | HEART RATE: 67 BPM | TEMPERATURE: 98.2 F | OXYGEN SATURATION: 98 % | RESPIRATION RATE: 18 BRPM | SYSTOLIC BLOOD PRESSURE: 99 MMHG

## 2025-01-23 LAB — GLUCOSE BLDC GLUCOMTR-MCNC: 78 MG/DL (ref 70–99)

## 2025-01-23 PROCEDURE — 250N000013 HC RX MED GY IP 250 OP 250 PS 637

## 2025-01-23 PROCEDURE — 250N000013 HC RX MED GY IP 250 OP 250 PS 637: Performed by: HOSPITALIST

## 2025-01-23 PROCEDURE — 99207 PR NO BILLABLE SERVICE THIS VISIT: CPT

## 2025-01-23 PROCEDURE — 101N000002 HC CUSTODIAL CARE DAILY

## 2025-01-23 PROCEDURE — 82962 GLUCOSE BLOOD TEST: CPT

## 2025-01-23 RX ADMIN — APIXABAN 5 MG: 5 TABLET, FILM COATED ORAL at 21:10

## 2025-01-23 RX ADMIN — BUPROPION HYDROCHLORIDE 450 MG: 300 TABLET, EXTENDED RELEASE ORAL at 09:27

## 2025-01-23 RX ADMIN — GABAPENTIN 300 MG: 300 CAPSULE ORAL at 21:10

## 2025-01-23 RX ADMIN — METFORMIN ER 500 MG 1000 MG: 500 TABLET ORAL at 09:26

## 2025-01-23 RX ADMIN — METOPROLOL SUCCINATE 100 MG: 50 TABLET, EXTENDED RELEASE ORAL at 09:26

## 2025-01-23 RX ADMIN — LAMOTRIGINE 50 MG: 25 TABLET ORAL at 11:42

## 2025-01-23 RX ADMIN — METOPROLOL SUCCINATE 100 MG: 50 TABLET, EXTENDED RELEASE ORAL at 21:10

## 2025-01-23 RX ADMIN — THIAMINE HCL TAB 100 MG 100 MG: 100 TAB at 09:27

## 2025-01-23 RX ADMIN — TOPIRAMATE 100 MG: 100 TABLET, FILM COATED ORAL at 09:25

## 2025-01-23 RX ADMIN — QUETIAPINE FUMARATE 400 MG: 200 TABLET ORAL at 21:10

## 2025-01-23 RX ADMIN — GABAPENTIN 300 MG: 300 CAPSULE ORAL at 15:00

## 2025-01-23 RX ADMIN — ATORVASTATIN CALCIUM 40 MG: 40 TABLET, FILM COATED ORAL at 21:10

## 2025-01-23 RX ADMIN — GABAPENTIN 300 MG: 300 CAPSULE ORAL at 09:26

## 2025-01-23 RX ADMIN — APIXABAN 5 MG: 5 TABLET, FILM COATED ORAL at 09:26

## 2025-01-23 RX ADMIN — DULOXETINE HYDROCHLORIDE 120 MG: 60 CAPSULE, DELAYED RELEASE ORAL at 09:25

## 2025-01-23 RX ADMIN — LISINOPRIL 5 MG: 5 TABLET ORAL at 09:25

## 2025-01-23 RX ADMIN — TOPIRAMATE 100 MG: 100 TABLET, FILM COATED ORAL at 21:09

## 2025-01-23 ASSESSMENT — ACTIVITIES OF DAILY LIVING (ADL)
ADLS_ACUITY_SCORE: 42
ADLS_ACUITY_SCORE: 40
ADLS_ACUITY_SCORE: 40
ADLS_ACUITY_SCORE: 42
ADLS_ACUITY_SCORE: 40
ADLS_ACUITY_SCORE: 42
ADLS_ACUITY_SCORE: 40
ADLS_ACUITY_SCORE: 40
ADLS_ACUITY_SCORE: 42
ADLS_ACUITY_SCORE: 42
ADLS_ACUITY_SCORE: 40
ADLS_ACUITY_SCORE: 42
ADLS_ACUITY_SCORE: 42
ADLS_ACUITY_SCORE: 40
ADLS_ACUITY_SCORE: 42
ADLS_ACUITY_SCORE: 41
ADLS_ACUITY_SCORE: 42
ADLS_ACUITY_SCORE: 40

## 2025-01-23 NOTE — PLAN OF CARE
Shift: 0650-0900  VS: Blood pressure 130/77, pulse 69, temperature 97.7  F (36.5  C), temperature source Oral, resp. rate 20, SpO2 94%.  Pain: Denies pain at this time  Neuro: A x O 4. PERRLA. Calls appropriately and makes needs known  Cardiac: WDL. No cardiac related chest pain   Respiratory: WDL. Denies SOB. O2 sat > 94% on RA  GI/Diet/Appetite:  Denies N/V. Regular diet   : voids via urinal   LDA's: n/A  Skin: intact  Activity: A x 1 with gait belt and walker. Bed alarm on  Tests/Procedures: n/a  Pertinent Labs/Lab Collection: n/A     Plan: pt under group home outpatient status. No significant changes this shift, continue POC.

## 2025-01-23 NOTE — PROGRESS NOTES
"[facility]    Medicine Progress Note - Hospitalist Service    Date of Admission:  1/22/2025    Assessment & Plan   Obi Messer V is a 69 year old male being transitioned to skilled nursing care on 1/22/2025. Please see the encounters from the same date for more details of her presentation and workup     [facility] Care Admission  - social work consulted, appreciate assistance      Weakness, Hx of left hip fracture s/p fall: PT/OT rec discharge to home with home PT  HTN, hx of ischemic cardiomyopathy, HFrEF, Hx CAD s/p PCI with LACHO to LAD and LCX: continue PTA atorvastatin, lisinopril, metoprolol, and eliquis  Atrial fibrillation: continue PTA metoprolol 100 mg BID and eliquis 5 mg BID   Type 2 DM: continue PTA metformin   CKD stage 3: stable during prior admission  Bipolar disorder, depression: continue PTA seroquel 400 mg at bedtime, duloxetine 120 mg daily, bupropion  mg daily, and lamotrigine 50 mg daily           Diet: Combination Diet Regular Diet    DVT Prophylaxis: DOAC  Reis Catheter: Not present  Lines: None     Cardiac Monitoring: None  Code Status: No CPR- Do NOT Intubate      Clinically Significant Risk Factors Present on Admission          # Hyperchloremia: Highest Cl = 108 mmol/L in last 2 days, will monitor as appropriate           # Drug Induced Coagulation Defect: home medication list includes an anticoagulant medication  # Drug Induced Platelet Defect: home medication list includes an antiplatelet medication   # Hypertension: Noted on problem list           # Obesity: Estimated body mass index is 30.99 kg/m  as calculated from the following:    Height as of 1/20/25: 1.727 m (5' 8\").    Weight as of 1/20/25: 92.4 kg (203 lb 13 oz).       # Financial/Environmental Concerns:           Social Drivers of Health    Food Insecurity: High Risk (1/22/2025)    Food Insecurity     Within the past 12 months, did you worry that your food would run out before you " got money to buy more?: No     Within the past 12 months, did the food you bought just not last and you didn t have money to get more?: Yes   Housing Stability: High Risk (1/22/2025)    Housing Stability     Do you have housing? : Yes     Are you worried about losing your housing?: Yes   Tobacco Use: High Risk (7/11/2021)    Patient History     Smoking Tobacco Use: Former     Smokeless Tobacco Use: Current   Transportation Needs: High Risk (1/22/2025)    Transportation Needs     Within the past 12 months, has lack of transportation kept you from medical appointments, getting your medicines, non-medical meetings or appointments, work, or from getting things that you need?: Yes   Social Connections: Socially Isolated (4/3/2024)    Received from [facility]    Social Connections     Do you often feel lonely or isolated from those around you?: 4          Disposition Plan     Medically Ready for Discharge: Ready Now  Expected Discharge: working on discharge plans with care coordination  Disposition difficulty: lack of housing and support systems            The patient's care was discussed with the Attending Physician, Dr. Cheney and Patient.    Tatum Maurice NP  Hospitalist Service  [facility]  Securely message with [employer] (more info)  Text page via [facility] Paging/Directory   ______________________________________________________________________    Interval History   Pedro Luis seen resting in bed. No acute events overnight.     Brief Visual Physical Exam   Vital Signs: Temp: 97.7  F (36.5  C) Temp src: Oral BP: 130/77 Pulse: 69   Resp: 20 SpO2: 94 % O2 Device: None (Room air)    Weight: 0 lbs 0 oz    GENERAL: Alert and awake. Answering questions appropriately. NAD. Pleasant and conversational  CARDIOVASCULAR: Appears well perfused   RESPIRATORY: Effort normal on RA.   MUSCULOSKELETAL: Moves all extremities.   EXTREMITIES: No peripheral  edema.  NEUROLOGICAL: Moving all extremities symmetrically.   SKIN: Intact. Warm and dry. No jaundice.     Medical Decision Making       20 MINUTES SPENT BY ME on the date of service doing chart review, history, exam, documentation & further activities per the note.

## 2025-01-23 NOTE — PROGRESS NOTES
Care Management Follow Up    Length of Stay (days): 0    Expected Discharge Date: 01/23/2025     Concerns to be Addressed:  Discharge planning   Patient plan of care discussed at interdisciplinary rounds: Yes    Anticipated Discharge Disposition:  TBD     Anticipated Discharge Services:  None  Anticipated Discharge DME:  None    Patient/family educated on Medicare website which has current facility and service quality ratings:  NO  Education Provided on the Discharge Plan:  Yes  Patient/Family in Agreement with the Plan:  Yes    Referrals Placed by CM/SAGAR:    Private pay costs discussed: Not applicable    Discussed  Partnership in Safe Discharge Planning  document with patient/family: No     Handoff Completed: No, handoff not indicated or clinically appropriate    Additional Information:  SAGAR received a message from Gabi at  that she needs pt to sign LUZ ELENA for her to follow up on MA application. SAGAR provided LUZ ELENA paper to pt and he signed and was agreeable to Gabi helping him with MA application follow up. SAGAR emailed the signed LUZ ELENA paper to Gabi. SAGAR called Michelle at [facility] to inform her that pt is now prison and has no medical reason to be at the hospital anymore and needs to return. Michelle stated as she said the other day, pt was not getting care at the facility and is not appropriate for the level of care at LTC. Michelle stated that they can help with pt's belongings when he finds a place but he cannot return. SAGAR asked Michelle to give her the number for their  and Michelle reported [facility] ([phone]) is there .  SAGAR called the office of the [facility]( [phone]) and left voicemail.     Next Steps:   Follow for discharge planning.    LOUISE Atkins, CHRISS  OBS/ED   Phone: [phone]  Fax: [phone]  Vocera: 1A Obs SAGAR

## 2025-01-23 NOTE — PLAN OF CARE
Patient A&O x 4, VSS, on RA  BP (!) 130/96 (BP Location: Right arm)   Pulse 93   Temp 98.5  F (36.9  C) (Oral)   Resp 17   SpO2 94%   Swallows po meds without difficulty. Tolerated 2 gram Sodium diet. Blood sugar BID 80, 86, Diabetes managed with diet at home. Patient refused INR lab draw this am.   Patient had fall at bedside overnight, Bed Alarm on. Voiding per urinal at bedside. No BM this shift. Ambulated with Assist x 1 with walker to Bathroom down the steel.     Patient's LTC Facility not accepting him back, SW is looking to resolve issue vs looking for new LTC Facility.

## 2025-01-23 NOTE — CONSULTS
Care Management Medical FDC Outpatient Consult    Care management consulted by provider for half-way assessment.  CM spoke with provider to discuss half-way case. Provider has confirmed patient is medically stable for discharge.    Background information: Patient has been residing at [facility] for over six months. He was initially admitted under TCU status, and then remained there awaiting an MNChoices Assessment and possible AL placement.     Care team recommended discharge disposition:  Return to [facility]; patient needs nurse home level of care but lacks long-term care MA.    Resources needed for discharge: LTC Aubrey presley Assessment    Discharge plan secured to meet patient's needs?  No    Estimated timeline for discharge:   greater than 24 hours    Barriers to discharge: [facility] declining to have patient return to facility.    Discussed above with provider & charge RN.      Next steps: Consider discussion with LTC [facility]; send referrals for LTC.    ________________    LOUISE Ledesma, CHRISS  ED/Observation   [facility]  Phone: [phone]  Fax: [phone]    After hours [employer] and After Hours [employer]  Available from 4:00pm - 8:30pm

## 2025-01-24 LAB — GLUCOSE BLDC GLUCOMTR-MCNC: 102 MG/DL (ref 70–99)

## 2025-01-24 PROCEDURE — 250N000013 HC RX MED GY IP 250 OP 250 PS 637

## 2025-01-24 PROCEDURE — 99207 PR NO BILLABLE SERVICE THIS VISIT: CPT

## 2025-01-24 PROCEDURE — 250N000013 HC RX MED GY IP 250 OP 250 PS 637: Performed by: HOSPITALIST

## 2025-01-24 PROCEDURE — 82962 GLUCOSE BLOOD TEST: CPT

## 2025-01-24 PROCEDURE — 101N000002 HC CUSTODIAL CARE DAILY

## 2025-01-24 RX ADMIN — APIXABAN 5 MG: 5 TABLET, FILM COATED ORAL at 08:17

## 2025-01-24 RX ADMIN — GABAPENTIN 300 MG: 300 CAPSULE ORAL at 08:15

## 2025-01-24 RX ADMIN — GABAPENTIN 300 MG: 300 CAPSULE ORAL at 16:57

## 2025-01-24 RX ADMIN — METOPROLOL SUCCINATE 100 MG: 50 TABLET, EXTENDED RELEASE ORAL at 22:24

## 2025-01-24 RX ADMIN — LISINOPRIL 5 MG: 5 TABLET ORAL at 08:15

## 2025-01-24 RX ADMIN — LAMOTRIGINE 50 MG: 25 TABLET ORAL at 08:16

## 2025-01-24 RX ADMIN — THIAMINE HCL TAB 100 MG 100 MG: 100 TAB at 08:15

## 2025-01-24 RX ADMIN — BUPROPION HYDROCHLORIDE 450 MG: 300 TABLET, EXTENDED RELEASE ORAL at 08:15

## 2025-01-24 RX ADMIN — APIXABAN 5 MG: 5 TABLET, FILM COATED ORAL at 22:24

## 2025-01-24 RX ADMIN — ATORVASTATIN CALCIUM 40 MG: 40 TABLET, FILM COATED ORAL at 22:24

## 2025-01-24 RX ADMIN — METOPROLOL SUCCINATE 100 MG: 50 TABLET, EXTENDED RELEASE ORAL at 08:15

## 2025-01-24 RX ADMIN — DULOXETINE HYDROCHLORIDE 120 MG: 60 CAPSULE, DELAYED RELEASE ORAL at 08:15

## 2025-01-24 RX ADMIN — QUETIAPINE FUMARATE 400 MG: 200 TABLET ORAL at 22:24

## 2025-01-24 RX ADMIN — TOPIRAMATE 100 MG: 100 TABLET, FILM COATED ORAL at 08:16

## 2025-01-24 RX ADMIN — GABAPENTIN 300 MG: 300 CAPSULE ORAL at 22:24

## 2025-01-24 RX ADMIN — METFORMIN ER 500 MG 1000 MG: 500 TABLET ORAL at 08:15

## 2025-01-24 RX ADMIN — TOPIRAMATE 100 MG: 100 TABLET, FILM COATED ORAL at 22:24

## 2025-01-24 ASSESSMENT — ACTIVITIES OF DAILY LIVING (ADL)
ADLS_ACUITY_SCORE: 40

## 2025-01-24 NOTE — PLAN OF CARE
Goal Outcome Evaluation:      Plan of Care Reviewed With: patient               Nursing note    Pain/Comfort: Pt having pain of right hip, pillow support provided, medication offered but patient refused.     Assessments/Progress: Vital signs stable. Afebrile. A&Ox4. Patient is able to stand and use walker independently to mobilize. Plan of care reviewed and choices provided. Safety factors WNL.     Priority nursing care for next shift: Continue finding placement  Discharge plan/ barriers to discharge: Patient condition is adequate for discharge

## 2025-01-24 NOTE — PROGRESS NOTES
"[facility]    Medicine Progress Note - Hospitalist Service    Date of Admission:  1/22/2025    Assessment & Plan   Obi Messer V is a 69 year old male being transitioned to snf care on 1/22/2025. Please see the encounters from the same date for more details of her presentation and workup     USP Care Admission  - social work consulted, appreciate assistance      Weakness, Hx of left hip fracture s/p fall: PT/OT rec discharge to home with home PT  HTN, hx of ischemic cardiomyopathy, HFrEF, Hx CAD s/p PCI with LACHO to LAD and LCX: continue PTA atorvastatin, lisinopril, metoprolol, and eliquis  Atrial fibrillation: continue PTA metoprolol 100 mg BID and eliquis 5 mg BID   Type 2 DM: continue PTA metformin   CKD stage 3: stable during prior admission  Bipolar disorder, depression: continue PTA seroquel 400 mg at bedtime, duloxetine 120 mg daily, bupropion  mg daily, and lamotrigine 50 mg daily           Diet: Combination Diet Regular Diet    DVT Prophylaxis: DOAC  Reis Catheter: Not present  Lines: None     Cardiac Monitoring: None  Code Status: No CPR- Do NOT Intubate      Clinically Significant Risk Factors Present on Admission                # Drug Induced Coagulation Defect: home medication list includes an anticoagulant medication  # Drug Induced Platelet Defect: home medication list includes an antiplatelet medication   # Hypertension: Noted on problem list           # Obesity: Estimated body mass index is 30.99 kg/m  as calculated from the following:    Height as of 1/20/25: 1.727 m (5' 8\").    Weight as of 1/20/25: 92.4 kg (203 lb 13 oz).       # Financial/Environmental Concerns:           Social Drivers of Health    Food Insecurity: High Risk (1/22/2025)    Food Insecurity     Within the past 12 months, did you worry that your food would run out before you got money to buy more?: No     Within the past 12 months, did the food you bought just not " last and you didn t have money to get more?: Yes   Housing Stability: High Risk (1/22/2025)    Housing Stability     Do you have housing? : Yes     Are you worried about losing your housing?: Yes   Tobacco Use: High Risk (7/11/2021)    Patient History     Smoking Tobacco Use: Former     Smokeless Tobacco Use: Current   Transportation Needs: High Risk (1/22/2025)    Transportation Needs     Within the past 12 months, has lack of transportation kept you from medical appointments, getting your medicines, non-medical meetings or appointments, work, or from getting things that you need?: Yes   Social Connections: Socially Isolated (4/3/2024)    Received from [employer] & [facility]    Social Connections     Do you often feel lonely or isolated from those around you?: 4          Disposition Plan     Medically Ready for Discharge: Ready Now  Expected Discharge: working on discharge plans with care coordination  Disposition difficulty: lack of housing and support systems            The patient's care was discussed with the Patient.    Tatum Maurice NP  Hospitalist Service  [facility]  Securely message with [employer] (more info)  Text page via [employer] Paging/Directory   ______________________________________________________________________    Interval History   Pedro Luis was seen sitting up in the chair. No acute events overnight.     Brief Visual Physical Exam   Vital Signs: Temp: 97.6  F (36.4  C) Temp src: Oral BP: 120/82 Pulse: 67   Resp: 16 SpO2: 96 % O2 Device: None (Room air)    Weight: 0 lbs 0 oz    GENERAL: Alert and awake. Answering questions appropriately. NAD. Pleasant and conversational  CARDIOVASCULAR: Appears well perfused   RESPIRATORY: Effort normal on RA.   MUSCULOSKELETAL: Moves all extremities.   EXTREMITIES: No peripheral edema.  NEUROLOGICAL: Moving all extremities symmetrically.   SKIN: Intact. Warm and dry. No jaundice.     Medical  Decision Making       10 MINUTES SPENT BY ME on the date of service doing chart review, history, exam, documentation & further activities per the note.

## 2025-01-24 NOTE — PROGRESS NOTES
Patient A&O, on intermediate outpatient status, denies pain at this time. Ambulated to the bathroom with a walker.

## 2025-01-24 NOTE — PLAN OF CARE
Patient A&O x4, no c/o pain this shift. BG 78 prior to dinner. Not OOB this shift, resting in bed.

## 2025-01-25 PROCEDURE — 250N000013 HC RX MED GY IP 250 OP 250 PS 637

## 2025-01-25 PROCEDURE — 250N000013 HC RX MED GY IP 250 OP 250 PS 637: Performed by: HOSPITALIST

## 2025-01-25 PROCEDURE — 99207 PR NO BILLABLE SERVICE THIS VISIT: CPT

## 2025-01-25 PROCEDURE — 101N000002 HC CUSTODIAL CARE DAILY

## 2025-01-25 RX ADMIN — DULOXETINE HYDROCHLORIDE 120 MG: 60 CAPSULE, DELAYED RELEASE ORAL at 08:13

## 2025-01-25 RX ADMIN — METOPROLOL SUCCINATE 100 MG: 50 TABLET, EXTENDED RELEASE ORAL at 08:13

## 2025-01-25 RX ADMIN — METOPROLOL SUCCINATE 100 MG: 50 TABLET, EXTENDED RELEASE ORAL at 23:25

## 2025-01-25 RX ADMIN — METFORMIN ER 500 MG 1000 MG: 500 TABLET ORAL at 08:13

## 2025-01-25 RX ADMIN — GABAPENTIN 300 MG: 300 CAPSULE ORAL at 14:20

## 2025-01-25 RX ADMIN — APIXABAN 5 MG: 5 TABLET, FILM COATED ORAL at 23:25

## 2025-01-25 RX ADMIN — LAMOTRIGINE 50 MG: 25 TABLET ORAL at 08:13

## 2025-01-25 RX ADMIN — GABAPENTIN 300 MG: 300 CAPSULE ORAL at 23:25

## 2025-01-25 RX ADMIN — TOPIRAMATE 100 MG: 100 TABLET, FILM COATED ORAL at 23:25

## 2025-01-25 RX ADMIN — ATORVASTATIN CALCIUM 40 MG: 40 TABLET, FILM COATED ORAL at 23:25

## 2025-01-25 RX ADMIN — BUPROPION HYDROCHLORIDE 450 MG: 300 TABLET, EXTENDED RELEASE ORAL at 08:14

## 2025-01-25 RX ADMIN — THIAMINE HCL TAB 100 MG 100 MG: 100 TAB at 08:13

## 2025-01-25 RX ADMIN — TOPIRAMATE 100 MG: 100 TABLET, FILM COATED ORAL at 08:14

## 2025-01-25 RX ADMIN — LISINOPRIL 5 MG: 5 TABLET ORAL at 08:13

## 2025-01-25 RX ADMIN — APIXABAN 5 MG: 5 TABLET, FILM COATED ORAL at 08:13

## 2025-01-25 RX ADMIN — GABAPENTIN 300 MG: 300 CAPSULE ORAL at 08:13

## 2025-01-25 RX ADMIN — QUETIAPINE FUMARATE 400 MG: 200 TABLET ORAL at 23:25

## 2025-01-25 ASSESSMENT — ACTIVITIES OF DAILY LIVING (ADL)
ADLS_ACUITY_SCORE: 42
ADLS_ACUITY_SCORE: 40
ADLS_ACUITY_SCORE: 42
ADLS_ACUITY_SCORE: 42
ADLS_ACUITY_SCORE: 40
ADLS_ACUITY_SCORE: 42
ADLS_ACUITY_SCORE: 40
ADLS_ACUITY_SCORE: 42

## 2025-01-25 NOTE — PROGRESS NOTES
Pt alert and Oriented, he is on residential outpatient statues, Pt incontinent and has urinal at bedside. Pt took all schedule medications.

## 2025-01-25 NOTE — PROGRESS NOTES
"[facility]    Medicine Progress Note - Hospitalist Service    Date of Admission:  1/22/2025    Assessment & Plan   Obi Messer V is a 69 year old male being transitioned to FDC care on 1/22/2025. Please see the encounters from the same date for more details of her presentation and workup     prison Care Admission  - social work consulted, appreciate assistance      Weakness, Hx of left hip fracture s/p fall: PT/OT rec discharge to home with home PT  HTN, hx of ischemic cardiomyopathy, HFrEF, Hx CAD s/p PCI with LACHO to LAD and LCX: continue PTA atorvastatin, lisinopril, metoprolol, and eliquis  Atrial fibrillation: continue PTA metoprolol 100 mg BID and eliquis 5 mg BID   Type 2 DM: continue PTA metformin   CKD stage 3: stable during prior admission  Bipolar disorder, depression: continue PTA seroquel 400 mg at bedtime, duloxetine 120 mg daily, bupropion  mg daily, and lamotrigine 50 mg daily           Diet: Combination Diet Regular Diet    DVT Prophylaxis: DOAC  Reis Catheter: Not present  Lines: None     Cardiac Monitoring: None  Code Status: No CPR- Do NOT Intubate      Clinically Significant Risk Factors Present on Admission                # Drug Induced Coagulation Defect: home medication list includes an anticoagulant medication  # Drug Induced Platelet Defect: home medication list includes an antiplatelet medication   # Hypertension: Noted on problem list           # Obesity: Estimated body mass index is 30.99 kg/m  as calculated from the following:    Height as of 1/20/25: 1.727 m (5' 8\").    Weight as of 1/20/25: 92.4 kg (203 lb 13 oz).       # Financial/Environmental Concerns:           Social Drivers of Health    Food Insecurity: High Risk (1/22/2025)    Food Insecurity     Within the past 12 months, did you worry that your food would run out before you got money to buy more?: No     Within the past 12 months, did the food you bought just not " last and you didn t have money to get more?: Yes   Housing Stability: High Risk (1/22/2025)    Housing Stability     Do you have housing? : Yes     Are you worried about losing your housing?: Yes   Tobacco Use: High Risk (7/11/2021)    Patient History     Smoking Tobacco Use: Former     Smokeless Tobacco Use: Current   Transportation Needs: High Risk (1/22/2025)    Transportation Needs     Within the past 12 months, has lack of transportation kept you from medical appointments, getting your medicines, non-medical meetings or appointments, work, or from getting things that you need?: Yes   Social Connections: Socially Isolated (4/3/2024)    Received from [facility]    Social Connections     Do you often feel lonely or isolated from those around you?: 4          Disposition Plan     Medically Ready for Discharge: Ready Now  Expected Discharge: working on discharge plans with care coordination  Disposition difficulty: lack of housing and support systems       Tatum Maurice NP  Hospitalist Service  [facility]  Securely message with [employer] (more info)  Text page via [employer] Paging/Directory   ______________________________________________________________________    Interval History   Pedro Luis was seen resting in bed. No acute events overnight    Physical Exam   Vital Signs: Temp: 98.1  F (36.7  C) Temp src: Oral BP: (!) 160/109 Pulse: 73   Resp: 18 SpO2: 98 % O2 Device: None (Room air)    Weight: 0 lbs 0 oz    GENERAL: Alert and awake. Answering questions appropriately. NAD. Pleasant and conversational  CARDIOVASCULAR: Appears well perfused   RESPIRATORY: Effort normal on RA.   MUSCULOSKELETAL: Moves all extremities.   EXTREMITIES: No peripheral edema.  NEUROLOGICAL: Moving all extremities symmetrically.   SKIN: Intact. Warm and dry. No jaundice.     Medical Decision Making       10 MINUTES SPENT BY ME on the date of service doing chart  review, history, exam, documentation & further activities per the note.

## 2025-01-26 ENCOUNTER — HEALTH MAINTENANCE LETTER (OUTPATIENT)
Age: 70
End: 2025-01-26

## 2025-01-26 PROCEDURE — 250N000013 HC RX MED GY IP 250 OP 250 PS 637: Performed by: HOSPITALIST

## 2025-01-26 PROCEDURE — 99207 PR NO BILLABLE SERVICE THIS VISIT: CPT

## 2025-01-26 PROCEDURE — 101N000002 HC CUSTODIAL CARE DAILY

## 2025-01-26 PROCEDURE — 250N000013 HC RX MED GY IP 250 OP 250 PS 637

## 2025-01-26 RX ADMIN — THIAMINE HCL TAB 100 MG 100 MG: 100 TAB at 08:23

## 2025-01-26 RX ADMIN — APIXABAN 5 MG: 5 TABLET, FILM COATED ORAL at 08:22

## 2025-01-26 RX ADMIN — METFORMIN ER 500 MG 1000 MG: 500 TABLET ORAL at 08:24

## 2025-01-26 RX ADMIN — BUPROPION HYDROCHLORIDE 450 MG: 300 TABLET, EXTENDED RELEASE ORAL at 08:21

## 2025-01-26 RX ADMIN — TOPIRAMATE 100 MG: 100 TABLET, FILM COATED ORAL at 22:06

## 2025-01-26 RX ADMIN — TOPIRAMATE 100 MG: 100 TABLET, FILM COATED ORAL at 08:23

## 2025-01-26 RX ADMIN — METOPROLOL SUCCINATE 100 MG: 50 TABLET, EXTENDED RELEASE ORAL at 08:20

## 2025-01-26 RX ADMIN — QUETIAPINE FUMARATE 400 MG: 200 TABLET ORAL at 22:04

## 2025-01-26 RX ADMIN — GABAPENTIN 300 MG: 300 CAPSULE ORAL at 08:24

## 2025-01-26 RX ADMIN — DULOXETINE HYDROCHLORIDE 120 MG: 60 CAPSULE, DELAYED RELEASE ORAL at 08:22

## 2025-01-26 RX ADMIN — GABAPENTIN 300 MG: 300 CAPSULE ORAL at 16:11

## 2025-01-26 RX ADMIN — LISINOPRIL 5 MG: 5 TABLET ORAL at 08:23

## 2025-01-26 RX ADMIN — APIXABAN 5 MG: 5 TABLET, FILM COATED ORAL at 22:05

## 2025-01-26 RX ADMIN — GABAPENTIN 300 MG: 300 CAPSULE ORAL at 22:05

## 2025-01-26 RX ADMIN — METOPROLOL SUCCINATE 100 MG: 50 TABLET, EXTENDED RELEASE ORAL at 22:05

## 2025-01-26 RX ADMIN — LAMOTRIGINE 50 MG: 25 TABLET ORAL at 08:19

## 2025-01-26 RX ADMIN — ATORVASTATIN CALCIUM 40 MG: 40 TABLET, FILM COATED ORAL at 22:05

## 2025-01-26 ASSESSMENT — ACTIVITIES OF DAILY LIVING (ADL)
ADLS_ACUITY_SCORE: 42

## 2025-01-26 NOTE — PROGRESS NOTES
Care Management Follow Up    Length of Stay (days): 0    Expected Discharge Date: 01/23/2025     Concerns to be Addressed:       Patient plan of care discussed at interdisciplinary rounds: Yes    Anticipated Discharge Disposition:        Anticipated Discharge Services:    Anticipated Discharge DME:      Patient/family educated on Medicare website which has current facility and service quality ratings:    Education Provided on the Discharge Plan:    Patient/Family in Agreement with the Plan:      Referrals Placed by CM/SW:    Private pay costs discussed: Not applicable    Discussed  Partnership in Safe Discharge Planning  document with patient/family: No     Handoff Completed: No, handoff not indicated or clinically appropriate    Additional Information:      1550 SAGAR met with Pedro Luis at bedside to provide update on discharge planning. SAGAR introduced self and role, and explained the reason for the visit. Pedro Luis agreed to speak with SAGAR.    SW explained Unit SW's progress so far: Financial Services referral had been made and a Financial Counselor was working to secure MA-LTC for pt; that skilled nursing referrals could not be made until the application had been submitted; that FC and SW had been attempting to get a copy of pt's MA-LTC application that previous facility had reported submitting. SAGAR explained that any facility that SW made a referral to would want a copy of the application for their financial staff to review and confirm. SGAAR also explained that as it was the weekend, no additional progress had been made.    Pedro Luis voiced understanding. No additional questions or concerns were reported. SAGAR provided availability and contact information and the call was ended.    Next Steps:    Follow up with previous facility for copy of MA-LTC application      SAGAR will continue to follow as needed.    LOUISE Gallegos, LGSW  ED/OBS   [facility]  Phone: [phone]  Pager: [phone]  Fax: [phone]     After hours  [employer] and After Hours [employer]     On-call pager, [phone], 4:00 pm to 8:30 pm

## 2025-01-26 NOTE — PROGRESS NOTES
Pt alert and Oriented, he is on care home outpatient status. Pt calls appropriately and let needs know.

## 2025-01-26 NOTE — PROGRESS NOTES
"[facility]    Medicine Progress Note - Hospitalist Service    Date of Admission:  1/22/2025    Assessment & Plan   Obi Messer V is a 69 year old male being transitioned to skilled nursing care on 1/22/2025. Please see the encounters from the same date for more details of her presentation and workup     snf Care Admission  - social work consulted, appreciate assistance      Weakness, Hx of left hip fracture s/p fall: PT/OT rec discharge to home with home PT  HTN, hx of ischemic cardiomyopathy, HFrEF, Hx CAD s/p PCI with LACHO to LAD and LCX: continue PTA atorvastatin, lisinopril, metoprolol, and eliquis  Atrial fibrillation: continue PTA metoprolol 100 mg BID and eliquis 5 mg BID   Type 2 DM: continue PTA metformin   CKD stage 3: stable during prior admission  Bipolar disorder, depression: continue PTA seroquel 400 mg at bedtime, duloxetine 120 mg daily, bupropion  mg daily, and lamotrigine 50 mg daily           Diet: Combination Diet Regular Diet    DVT Prophylaxis: DOAC  Reis Catheter: Not present  Lines: None     Cardiac Monitoring: None  Code Status: No CPR- Do NOT Intubate      Clinically Significant Risk Factors Present on Admission                # Drug Induced Coagulation Defect: home medication list includes an anticoagulant medication  # Drug Induced Platelet Defect: home medication list includes an antiplatelet medication   # Hypertension: Noted on problem list           # Obesity: Estimated body mass index is 30.99 kg/m  as calculated from the following:    Height as of 1/20/25: 1.727 m (5' 8\").    Weight as of 1/20/25: 92.4 kg (203 lb 13 oz).       # Financial/Environmental Concerns:           Social Drivers of Health    Food Insecurity: High Risk (1/22/2025)    Food Insecurity     Within the past 12 months, did you worry that your food would run out before you got money to buy more?: No     Within the past 12 months, did the food you bought just not " last and you didn t have money to get more?: Yes   Housing Stability: High Risk (1/22/2025)    Housing Stability     Do you have housing? : Yes     Are you worried about losing your housing?: Yes   Tobacco Use: High Risk (7/11/2021)    Patient History     Smoking Tobacco Use: Former     Smokeless Tobacco Use: Current   Transportation Needs: High Risk (1/22/2025)    Transportation Needs     Within the past 12 months, has lack of transportation kept you from medical appointments, getting your medicines, non-medical meetings or appointments, work, or from getting things that you need?: Yes   Social Connections: Socially Isolated (4/3/2024)    Received from [employer]    Social Connections     Do you often feel lonely or isolated from those around you?: 4          Disposition Plan     Medically Ready for Discharge: Ready Now  Expected Discharge: working on discharge plans with care coordination  Disposition difficulty: lack of housing and support systems       Tatum Maurice NP  Hospitalist Service  [facility]  Securely message with [employer] (more info)  Text page via [employer] Paging/Directory   ______________________________________________________________________    Interval History   Pedro Luis was seen resting in bed. No acute events overnight     Physical Exam   Vital Signs: Temp: 97.8  F (36.6  C) Temp src: Oral BP: 140/89 Pulse: 71   Resp: 18 SpO2: 100 % O2 Device: None (Room air)    Weight: 0 lbs 0 oz    GENERAL: Alert and awake. Answering questions appropriately. NAD. Pleasant and conversational  CARDIOVASCULAR: Appears well perfused   RESPIRATORY: Effort normal on RA.   MUSCULOSKELETAL: Moves all extremities.   EXTREMITIES: No peripheral edema.  NEUROLOGICAL: Moving all extremities symmetrically.   SKIN: Intact. Warm and dry. No jaundice.     Medical Decision Making       10 MINUTES SPENT BY ME on the date of service doing chart review,  history, exam, documentation & further activities per the note.      Data   Imaging results reviewed over the past 24 hrs:   No results found for this or any previous visit (from the past 24 hours).  Recent Labs   Lab 01/24/25  2322 01/23/25  1823 01/22/25  2208 01/22/25  1153 01/22/25  0622 01/21/25  1030 01/21/25  0945 01/21/25  0605 01/20/25  0907 01/20/25  0543 01/19/25  2113 01/19/25 2110   WBC  --   --   --   --  7.0 10.4  --   --   --  8.1  --  9.0   HGB  --   --   --   --  13.9 14.5  --   --   --  13.3  --  12.5*   MCV  --   --   --   --  90 88  --   --   --  89  --  90   PLT  --   --   --   --  170 192  --   --   --  174  --  176   INR  --   --   --   --   --  1.04  --   --   --  1.18* 1.26*  --    NA  --   --   --   --  138  --   --  139  --  140  --  137   POTASSIUM  --   --   --   --  5.1  --   --  4.4  --  3.6  --  4.3   CHLORIDE  --   --   --   --  108*  --   --  109*  --  108*  --  107   CO2  --   --   --   --  19*  --   --  22  --  21*  --  20*   BUN  --   --   --   --  23.7*  --   --  23.1*  --  20.0  --  23.6*   CR  --   --   --   --  0.90  --   --  1.04  --  0.94  --  0.99   ANIONGAP  --   --   --   --  11  --   --  8  --  11  --  10   CHUCK  --   --   --   --  9.7  --   --  9.2  --  9.2  --  8.9   * 78 134*   < > 89  --    < > 100*   < > 83  --  125*   ALBUMIN  --   --   --   --   --   --   --   --   --   --   --  3.9   PROTTOTAL  --   --   --   --   --   --   --   --   --   --   --  7.0   BILITOTAL  --   --   --   --   --   --   --   --   --   --   --  0.3   ALKPHOS  --   --   --   --   --   --   --   --   --   --   --  111   ALT  --   --   --   --   --   --   --   --   --   --   --  18   AST  --   --   --   --   --   --   --   --   --   --   --  21    < > = values in this interval not displayed.

## 2025-01-27 PROCEDURE — 250N000013 HC RX MED GY IP 250 OP 250 PS 637

## 2025-01-27 PROCEDURE — 101N000002 HC CUSTODIAL CARE DAILY

## 2025-01-27 PROCEDURE — 99207 PR NO BILLABLE SERVICE THIS VISIT: CPT

## 2025-01-27 PROCEDURE — 250N000013 HC RX MED GY IP 250 OP 250 PS 637: Performed by: HOSPITALIST

## 2025-01-27 RX ADMIN — ATORVASTATIN CALCIUM 40 MG: 40 TABLET, FILM COATED ORAL at 20:06

## 2025-01-27 RX ADMIN — APIXABAN 5 MG: 5 TABLET, FILM COATED ORAL at 20:07

## 2025-01-27 RX ADMIN — GABAPENTIN 300 MG: 300 CAPSULE ORAL at 20:06

## 2025-01-27 RX ADMIN — TOPIRAMATE 100 MG: 100 TABLET, FILM COATED ORAL at 09:46

## 2025-01-27 RX ADMIN — TOPIRAMATE 100 MG: 100 TABLET, FILM COATED ORAL at 21:31

## 2025-01-27 RX ADMIN — BUPROPION HYDROCHLORIDE 450 MG: 300 TABLET, EXTENDED RELEASE ORAL at 09:48

## 2025-01-27 RX ADMIN — METOPROLOL SUCCINATE 100 MG: 50 TABLET, EXTENDED RELEASE ORAL at 20:07

## 2025-01-27 RX ADMIN — QUETIAPINE FUMARATE 400 MG: 200 TABLET ORAL at 21:30

## 2025-01-27 RX ADMIN — THIAMINE HCL TAB 100 MG 100 MG: 100 TAB at 09:43

## 2025-01-27 RX ADMIN — LAMOTRIGINE 50 MG: 25 TABLET ORAL at 09:48

## 2025-01-27 RX ADMIN — METFORMIN ER 500 MG 1000 MG: 500 TABLET ORAL at 09:53

## 2025-01-27 RX ADMIN — APIXABAN 5 MG: 5 TABLET, FILM COATED ORAL at 09:43

## 2025-01-27 RX ADMIN — LISINOPRIL 5 MG: 5 TABLET ORAL at 09:43

## 2025-01-27 RX ADMIN — GABAPENTIN 300 MG: 300 CAPSULE ORAL at 15:31

## 2025-01-27 RX ADMIN — METOPROLOL SUCCINATE 100 MG: 50 TABLET, EXTENDED RELEASE ORAL at 09:43

## 2025-01-27 RX ADMIN — GABAPENTIN 300 MG: 300 CAPSULE ORAL at 09:43

## 2025-01-27 RX ADMIN — DULOXETINE HYDROCHLORIDE 120 MG: 60 CAPSULE, DELAYED RELEASE ORAL at 09:46

## 2025-01-27 ASSESSMENT — ACTIVITIES OF DAILY LIVING (ADL)
ADLS_ACUITY_SCORE: 42

## 2025-01-27 NOTE — PROGRESS NOTES
"[facility]    Medicine Progress Note - Hospitalist Service    Date of Admission:  1/22/2025    Assessment & Plan   Obi Messer V is a 69 year old male being transitioned to [facility] care on 1/22/2025. Please see the encounters from the same date for more details of her presentation and workup     half-way Care Admission  - social work consulted, appreciate assistance      Weakness, Hx of left hip fracture s/p fall: PT/OT rec discharge to home with home PT  HTN, hx of ischemic cardiomyopathy, HFrEF, Hx CAD s/p PCI with LACHO to LAD and LCX: continue PTA atorvastatin, lisinopril, metoprolol, and eliquis  Atrial fibrillation: continue PTA metoprolol 100 mg BID and eliquis 5 mg BID   Type 2 DM: continue PTA metformin   CKD stage 3: stable during prior admission  Bipolar disorder, depression: continue PTA seroquel 400 mg at bedtime, duloxetine 120 mg daily, bupropion  mg daily, and lamotrigine 50 mg daily           Diet: Combination Diet Regular Diet    DVT Prophylaxis: Ambulate every shift  Reis Catheter: Not present  Lines: None     Cardiac Monitoring: None  Code Status: No CPR- Do NOT Intubate      Clinically Significant Risk Factors Present on Admission                # Drug Induced Coagulation Defect: home medication list includes an anticoagulant medication  # Drug Induced Platelet Defect: home medication list includes an antiplatelet medication   # Hypertension: Noted on problem list           # Obesity: Estimated body mass index is 30.99 kg/m  as calculated from the following:    Height as of 1/20/25: 1.727 m (5' 8\").    Weight as of 1/20/25: 92.4 kg (203 lb 13 oz).       # Financial/Environmental Concerns:           Social Drivers of Health    Food Insecurity: High Risk (1/22/2025)    Food Insecurity     Within the past 12 months, did you worry that your food would run out before you got money to buy more?: No     Within the past 12 months, did the food you " "bought just not last and you didn t have money to get more?: Yes   Housing Stability: High Risk (1/22/2025)    Housing Stability     Do you have housing? : Yes     Are you worried about losing your housing?: Yes   Tobacco Use: High Risk (7/11/2021)    Patient History     Smoking Tobacco Use: Former     Smokeless Tobacco Use: Current   Transportation Needs: High Risk (1/22/2025)    Transportation Needs     Within the past 12 months, has lack of transportation kept you from medical appointments, getting your medicines, non-medical meetings or appointments, work, or from getting things that you need?: Yes   Social Connections: Socially Isolated (4/3/2024)    Received from [employer] [facility]    Social Connections     Do you often feel lonely or isolated from those around you?: 4          Disposition Plan     Medically Ready for Discharge: Ready Now        The patient's care was discussed with the Attending Physician, Dr. Cheney, Bedside Nurse, Care Coordinator/, and Patient.    LILY Chen [facility]  Hospitalist Service  [facility]  Securely message with Vocera (more info)  Text page via [facility] Paging/Directory     This chart documentation was completed with Dragon voice-recognition software. Even though reviewed, this chart may still contain some grammatical, spelling, and word errors. Please contact the author for any questions or clarifications.     ______________________________________________________________________    Interval History   Nursing/SW/consult/interdisciplinary healthcare worker's notes reviewed.     I reviewed all medications, new labs and imaging results over the last 24 hours. Please see discussion of these results in the A/P.    No acute events or complaints overnight. Fears being discharged \"to the streets.\"     ROS: 12 point ROS negative other than the symptoms noted here or above in the assessment and plan. "       Physical Exam   Vital Signs: Temp: 98  F (36.7  C) Temp src: Oral BP: (!) 129/91 Pulse: 60   Resp: 18 SpO2: 99 % O2 Device: None (Room air)    Weight: 0 lbs 0 oz    GENERAL: Alert and awake. Answering questions appropriately. NAD. Pleasant and conversational,  CARDIOVASCULAR: Appears well perfused   RESPIRATORY: Effort normal on RA.   MUSCULOSKELETAL: Moves all extremities.   EXTREMITIES: No peripheral edema.  NEUROLOGICAL: Moving all extremities symmetrically. Sad affect  SKIN: Intact. Warm and dry. No jaundice.      Medical Decision Making       35 MINUTES SPENT BY ME on the date of service doing chart review, history, exam, documentation & further activities per the note.      Data         Imaging results reviewed over the past 24 hrs:   No results found for this or any previous visit (from the past 24 hours).

## 2025-01-27 NOTE — PROGRESS NOTES
Care Management Follow Up    Length of Stay (days): 0    Expected Discharge Date: 01/23/2025     Concerns to be Addressed: Discharge planning.       Patient plan of care discussed at interdisciplinary rounds: Yes    Anticipated Discharge Disposition:  TBD    Anticipated Discharge Services:  TBD  Anticipated Discharge DME: None    Patient/family educated on Medicare website which has current facility and service quality ratings:  NO  Education Provided on the Discharge Plan:  Yes  Patient/Family in Agreement with the Plan:  Yes    Referrals Placed by CM/SW:  SAGAR contacted [facility] regarding pt's LTC refusing to take him back.   Private pay costs discussed: Not applicable    Discussed  Partnership in Safe Discharge Planning  document with patient/family: No     Handoff Completed: No, handoff not indicated or clinically appropriate    Additional Information:  SAGAR called Rhea Aki ([phone] ) [facility] regarding pt's LTC refusing to take him back and not having discharge plan in place. SAGAR provided Rhea with the request information for her to obtain consent from pt to advocate for him . SAGAR met with pt at bedside and he shared that Rhea already called him and he was able to talk about what happened with [facility]. SAGAR called Rhea back and she shared that colleague Alayna( [phone]) will follow up with pt.  Next Steps:   -SAGAR will contact [facility] tomorrow regarding pt.    LOUISE Atkins, CHRISS  OBS/ED   Phone: [phone]  Fax: [phone]  Vocera: 1A Obs SAGAR

## 2025-01-28 VITALS
HEART RATE: 67 BPM | OXYGEN SATURATION: 100 % | TEMPERATURE: 97.5 F | SYSTOLIC BLOOD PRESSURE: 158 MMHG | RESPIRATION RATE: 16 BRPM | DIASTOLIC BLOOD PRESSURE: 97 MMHG

## 2025-01-28 LAB — GLUCOSE BLDC GLUCOMTR-MCNC: 82 MG/DL (ref 70–99)

## 2025-01-28 PROCEDURE — 250N000013 HC RX MED GY IP 250 OP 250 PS 637: Performed by: HOSPITALIST

## 2025-01-28 PROCEDURE — 99207 PR NO BILLABLE SERVICE THIS VISIT: CPT

## 2025-01-28 PROCEDURE — 250N000013 HC RX MED GY IP 250 OP 250 PS 637

## 2025-01-28 PROCEDURE — 82962 GLUCOSE BLOOD TEST: CPT

## 2025-01-28 RX ORDER — BUPROPION HYDROCHLORIDE 450 MG/1
450 TABLET, FILM COATED, EXTENDED RELEASE ORAL DAILY
DISCHARGE
Start: 2025-01-29

## 2025-01-28 RX ADMIN — TOPIRAMATE 100 MG: 100 TABLET, FILM COATED ORAL at 10:12

## 2025-01-28 RX ADMIN — LAMOTRIGINE 50 MG: 25 TABLET ORAL at 10:13

## 2025-01-28 RX ADMIN — METFORMIN ER 500 MG 1000 MG: 500 TABLET ORAL at 10:13

## 2025-01-28 RX ADMIN — LISINOPRIL 5 MG: 5 TABLET ORAL at 10:19

## 2025-01-28 RX ADMIN — METOPROLOL SUCCINATE 100 MG: 50 TABLET, EXTENDED RELEASE ORAL at 10:19

## 2025-01-28 RX ADMIN — BUPROPION HYDROCHLORIDE 450 MG: 300 TABLET, EXTENDED RELEASE ORAL at 10:13

## 2025-01-28 RX ADMIN — APIXABAN 5 MG: 5 TABLET, FILM COATED ORAL at 10:13

## 2025-01-28 RX ADMIN — GABAPENTIN 300 MG: 300 CAPSULE ORAL at 10:35

## 2025-01-28 RX ADMIN — THIAMINE HCL TAB 100 MG 100 MG: 100 TAB at 10:13

## 2025-01-28 RX ADMIN — DULOXETINE HYDROCHLORIDE 120 MG: 60 CAPSULE, DELAYED RELEASE ORAL at 10:13

## 2025-01-28 ASSESSMENT — ACTIVITIES OF DAILY LIVING (ADL)
ADLS_ACUITY_SCORE: 42

## 2025-01-28 NOTE — DISCHARGE SUMMARY
"[facility]  Hospitalist Discharge Summary      Date of Admission:  1/22/2025  Date of Discharge:  1/28/2025  Discharging Provider: LILY Chen CNP  Discharge Service: Hospitalist Service    Discharge Diagnoses     Weakness   Hx of left hip fracture s/p fall   HTN  Hx of ischemic cardiomyopathy   HFrEF   Hx CAD s/p PCI with LACHO to LAD and LCX  Atrial fibrillation  Type 2 DM  CKD stage 3  Bipolar disorder  Depression  Fall with head strike    Clinically Significant Risk Factors     # Obesity: Estimated body mass index is 30.99 kg/m  as calculated from the following:    Height as of 1/20/25: 1.727 m (5' 8\").    Weight as of 1/20/25: 92.4 kg (203 lb 13 oz).       Follow-ups Needed After Discharge     Follow up with primary care provider, DAVID GANT, within 7 days for hospital follow- up and to follow up on results.  No follow up labs or test are needed. Please discuss a potential referral to a cardiologist within your network at discussed on day of discharge.        Appointments on [facility] and/or [facility] (with [facility] or [facility] provider or service). Call [phone] if you haven't heard regarding these appointments within 7 days of discharge.         Unresulted Labs Ordered in the Past 30 Days of this Admission       No orders found from 12/23/2024 to 1/23/2025.        These results will be followed up by N/A.     Discharge Disposition   Discharged to home  Condition at discharge: Stable    Hospital Course   Obi Messer V is a 69 year old male admitted on 1/19/2025.  History of HFpEF, coronary artery disease, hypertension, chronic kidney disease, depression, dermatomyositis, bipolar disorder, atrial fibrillation & type 2 diabetes admitted for generalized weakness.      Weakness   Hx of left hip fracture s/p fall   Describes an episode where he \"lost control of my body both upper and lower extremities\" and was unable to ambulate without any other " associated symptoms or known inciting illness.  No loss of consciousness.  At baseline utilizes a walker.  Vitally stable.  Labs without leukocytosis, normal procal and mildly elevated CRP.  EKG and troponin reassuring against cardiac event.  CT head without acute process.  Chest x-ray noted to have possible density in the left lower lung with? pneumonitis, clinically does not appear like overt pneumonia. COVID, flu A and B, and RSV negative. Initially appeared hypervolemic and was diuresed. He did have a hospitalization in April 2024 that began with weakness and failure to thrive. TSH, CK, troponin, and procal WNL. CRP elevated at 15.20 however unremarkable workup. MRI brain 1/20 with no acute intracranial pathology; moderate generalized volume loss and chronic small vessel ischemic disease. Echo 1/20 with mildly reduced LV function, EF 45-50%, inferior wall akinesis, RV function normal, no pericardial effusion. PT/OT consulted who recommended discharging to home with home PT.      HTN  Hx of ischemic cardiomyopathy   HFrEF   Hx CAD s/p PCI with LACHO to LAD and LCX  BNP elevated to 1300 (though higher in the past), bedside echo performed by emergency department physician demonstrated grossly diminished function. S/p 1L fluid followed by diuresis in the ED. Previous echo in April 2024 at OSH with EF 55-60%, normal LV function, normal RV function. Repeat echo on 1/20 with EF 45-50%, mildly reduced LV function, inferior wall akinesis, RV function normal, no pericardial effusion. Previously on lasix and spironolactone however does not take anymore. Discussed echo findings with Pedro Luis. Would prefer to follow up with his PCP at discharge to discuss options and potentially have a referral sent at that time within their health system. Continue PTA atorvastatin, lisinopril, metoprolol, and eliquis.      Atrial fibrillation  Continue PTA metoprolol 100 mg BID and eliquis 5 mg BID      Type 2 DM  HgbA1c 6.2% on 1/19/2025. PTA  metformin held while inpatient; okay to resume as OP.      CKD stage 3  Creatinine 0.99 on admission, appears stable per chart review.      Bipolar disorder  Depression  Continue PTA seroquel 400 mg at bedtime, duloxetine 120 mg daily, bupropion  mg daily, and lamotrigine 50 mg daily      Fall with head strike  Morning of 1/22, patient was using urinal on EOB when he dozed off. Patient said that he tucked and rolled, hitting his head on the wall and then the ground. Denied pain or dizziness, vitals WNL, A&O x4. Advised for CT scan as he is on blood thinners however patient refused. Neuro checks stable.        Consultations This Hospital Stay   CARE MANAGEMENT / SOCIAL WORK IP CONSULT  PHARMACY IP CONSULT    Code Status   No CPR- Do NOT Intubate    Time Spent on this Encounter   I, LILY Chen CNP, personally saw the patient today and spent less than or equal to 30 minutes discharging this patient.       LILY Chen CNP  [facility] [address] OBSERVATION  [address] [address]  Phone: [phone]  Fax: [phone]  ______________________________________________________________________    Physical Exam   Vital Signs: Temp: 97.5  F (36.4  C) Temp src: Oral BP: (!) 158/97 Pulse: 67   Resp: 16 SpO2: 100 % O2 Device: None (Room air)    Weight: 0 lbs 0 oz    GENERAL: Alert and awake. Answering questions appropriately. NAD. Pleasant and conversational,  CARDIOVASCULAR: Appears well perfused   RESPIRATORY: Effort normal on RA.   MUSCULOSKELETAL: Moves all extremities.   EXTREMITIES: No peripheral edema.  NEUROLOGICAL: Moving all extremities symmetrically. Sad affect  SKIN: Intact. Warm and dry. No jaundice.             Primary Care Physician   DAVID GANT    Discharge Orders       Significant Results and Procedures   Most Recent 3 CBC's:  Recent Labs   Lab Test 01/22/25  0622 01/21/25  1030 01/20/25  0543   WBC 7.0 10.4 8.1   HGB 13.9 14.5 13.3   MCV 90 88  89    192 174     Most Recent 3 BMP's:  Recent Labs   Lab Test 01/28/25  1014 01/24/25  2322 01/23/25  1823 01/22/25  1153 01/22/25  0622 01/21/25  0945 01/21/25  0605 01/20/25  0907 01/20/25  0543   NA  --   --   --   --  138  --  139  --  140   POTASSIUM  --   --   --   --  5.1  --  4.4  --  3.6   CHLORIDE  --   --   --   --  108*  --  109*  --  108*   CO2  --   --   --   --  19*  --  22  --  21*   BUN  --   --   --   --  23.7*  --  23.1*  --  20.0   CR  --   --   --   --  0.90  --  1.04  --  0.94   ANIONGAP  --   --   --   --  11  --  8  --  11   CHUCK  --   --   --   --  9.7  --  9.2  --  9.2   GLC 82 102* 78   < > 89   < > 100*   < > 83    < > = values in this interval not displayed.     Most Recent 2 LFT's:  Recent Labs   Lab Test 01/19/25  2110 09/09/20  1524   AST 21 22   ALT 18 33   ALKPHOS 111 111   BILITOTAL 0.3 0.3   ,   Results for orders placed or performed during the hospital encounter of 01/19/25   CT Head w/o Contrast    Narrative    EXAM: CT HEAD W/O CONTRAST  LOCATION: [facility]  DATE: 1/19/2025    INDICATION: AMS  COMPARISON: 12/18/2024  TECHNIQUE: Routine CT Head without IV contrast. Multiplanar reformats. Dose reduction techniques were used.    FINDINGS:  INTRACRANIAL CONTENTS: No intracranial hemorrhage, extraaxial collection, or mass effect.  No CT evidence of acute infarct. Mild to moderate presumed chronic small vessel ischemic changes. Mild to moderate generalized volume loss. No hydrocephalus.     VISUALIZED ORBITS/SINUSES/MASTOIDS: No intraorbital abnormality. No paranasal sinus mucosal disease. No middle ear or mastoid effusion.    BONES/SOFT TISSUES: No acute abnormality.      Impression    IMPRESSION:  1.  No acute intracranial process.     XR Chest 2 Views    Narrative    EXAM: XR CHEST 2 VIEWS  LOCATION: [facility]  DATE: 1/19/2025    INDICATION: Weakness.  COMPARISON: 6/4/2024.       Impression    IMPRESSION: Heart size and pulmonary vascularity within normal limits. Mild hazy increased density left lower lung with a mild or low-grade pneumonitis not excluded. Clinical correlation. Right lung is clear. Aortic calcification. Degenerative   postoperative changes in the spine. Otherwise unremarkable.   POC US ECHO LIMITED    Impression    Bedside, focused cardiac ultrasound performed and interpreted by me.    Indication: Eval global function    Parasternal long, parasternal short, apical four-chamber views were acquired for gross evaluation of global cardiac function, Gross RV strain and pericardial effusion.  Image quality was satisfactory. Global left ventricular function appears grossly diminished.  The LV chamber appears larger than the RV chamber.  There is biatrial enlargement.  There is no evidence of free fluid within the pericardium.     Impression: Bedside limited cardiac ultrasound showing grossly diminished left ventricular function. No gross evidence for right ventricular strain.   There is no pericardial effusion.  Atrial enlargement      Bedside focused lung ultrasound performed and interpreted by me.     Indication: Eval for evidence of pulmonary edema     Patient position: semirecumbent    Windows: apical fields, lateral fields.     Findings:    Predominance  Impression: pulmonary edema          MR Brain w/o Contrast    Narrative    MR BRAIN W/O CONTRAST 1/20/2025 10:18 AM    Provided History: reported weakness, R>L, eval CVA  ICD-10:    Comparison:  Head CT 1/19/2025     Technique: Sagittal T1-weighted, coronal T2-weighted, axial T2 FLAIR,  axial susceptibility weighted, and axial diffusion-weighted with ADC  map images of the brain were obtained without intravenous contrast.    Findings: No intracranial mass lesion, mass effect, midline shift, or  abnormal fluid collection. Moderate generalized volume loss with  enlarged right precentral sulcus and bilateral temporal sulci  and  enlarged lateral ventricles. Moderate chronic small vessel ischemic  changes. Subcortical T2 hyperintensity and suspected cortical loss in  the posterior right middle frontal gyrus No abnormality of reduced  diffusion.  Normal intravascular flow voids.       Impression    Impression: No acute intracranial pathology. Moderate generalized  volume loss and chronic small vessel ischemic disease.    I have personally reviewed the examination and initial interpretation  and I agree with the findings.    LEONARD LEONE MD         SYSTEM ID:  [id number]   Echo Complete     Value    LVEF  45-50% (mildly reduced)    Narrative    [id number]  [id number]  [id number]  [id number]^ERI^REJI^NICK     [facility]  Echocardiography Laboratory  [address] [place] [address]     Name: GLORIA MARTIN V  MRN: [id number]  : 1955  Study Date: 2025 11:27 AM  Age: 69 yrs  Gender: Male  Patient Location: [facility]  Reason For Study: Dyspnea  Ordering Physician: REJI HWANG  Performed By: Elfego Fernando RDCS     BSA: 2.3 m2  Height: 68 in  Weight: 272 lb  HR: 80  BP: 147/96 mmHg  ______________________________________________________________________________  Procedure  Echocardiogram with two-dimensional, color and spectral Doppler.  ______________________________________________________________________________  Interpretation Summary  Left ventricular size is normal.  Left ventricular function is decreased. The ejection fraction is 45-50%  (mildly reduced).  Inferior wall akinesis is present.  Right ventricular function, chamber size, wall motion, and thickness are  normal.  Pulmonary artery systolic pressure cannot be assessed.  The inferior vena cava is normal.  No pericardial effusion is present.  ______________________________________________________________________________  Left Ventricle  Left ventricular size is normal. Left ventricular wall thickness is normal.  Left  ventricular function is decreased. The ejection fraction is 45-50%  (mildly reduced). Diastolic function not assessed due to arrhythmia. Inferior  wall akinesis is present.     Right Ventricle  Right ventricular function, chamber size, wall motion, and thickness are  normal.     Atria  The right atria appears normal. Mild left atrial enlargement is present. The  atrial septum is intact as assessed by color Doppler .     Mitral Valve  The mitral valve is normal. Trace to mild mitral insufficiency is present.     Aortic Valve  Aortic valve sclerosis is present.     Tricuspid Valve  The tricuspid valve is normal. Trace tricuspid insufficiency is present.  Pulmonary artery systolic pressure cannot be assessed.     Pulmonic Valve  The pulmonic valve is normal.     Vessels  The thoracic aorta is normal. The pulmonary artery is normal. The inferior  vena cava is normal.     Pericardium  No pericardial effusion is present.     ______________________________________________________________________________  MMode/2D Measurements & Calculations  IVSd: 1.1 cm  LVIDd: 5.4 cm  LVIDs: 4.3 cm  LVPWd: 1.2 cm  FS: 20.2 %     LV mass(C)d: 257.0 grams  LV mass(C)dI: 110.4 grams/m2  Ao root diam: 3.3 cm  asc Aorta Diam: 3.5 cm  LVOT diam: 2.2 cm  LVOT area: 3.8 cm2     EF(MOD-bp): 48.5 %  Ao root diam index Ht(cm/m): 1.9  Ao root diam index BSA (cm/m2): 1.4  Asc Ao diam index BSA (cm/m2): 1.5  Asc Ao diam index Ht(cm/m): 2.0  LA Volume (BP): 85.1 ml     LA Volume Index (BP): 36.5 ml/m2  RV Base: 4.3 cm  RWT: 0.46  TAPSE: 2.0 cm     Doppler Measurements & Calculations  MV E max slick: 106.0 cm/sec  MV dec slope: 546.6 cm/sec2  MV dec time: 0.19 sec  PA acc time: 0.09 sec  E/E' av.6  Lateral E/e': 5.8  Medial E/e': 11.3  RV S Slick: 11.0 cm/sec     ______________________________________________________________________________  Report approved by: ITAGO Cuellar MD on 2025 12:38 PM             Discharge Medications   Current Discharge  Medication List        CONTINUE these medications which have NOT CHANGED    Details   acetaminophen (TYLENOL) 500 MG tablet Take 1,000 mg by mouth 3 times daily.      apixaban ANTICOAGULANT (ELIQUIS) 5 MG tablet Take 5 mg by mouth 2 times daily.      aspirin EC 81 MG EC tablet Take 1 tablet by mouth daily.  Qty: 100 tablet, Refills: 5    Associated Diagnoses: CAD (coronary artery disease)      atorvastatin (LIPITOR) 40 MG tablet Take 40 mg by mouth every evening.      !! buPROPion (WELLBUTRIN XL) 150 MG 24 hr tablet Take 150 mg by mouth every morning. Take with the 300 mg tablet for total morning dose of 450 mg      !! buPROPion (WELLBUTRIN XL) 300 MG 24 hr tablet Take 300 mg by mouth every morning. Take with the 150 mg tablet for total morning dose of 450 mg      carboxymethylcellulose PF (REFRESH PLUS) 0.5 % ophthalmic solution Place 1 drop into both eyes every hour as needed for dry eyes.      cetirizine (ZYRTEC) 10 MG tablet Take 10 mg by mouth daily.      clotrimazole (LOTRIMIN) 1 % external cream Apply topically daily as needed (groin rash).      Dextromethorphan-guaiFENesin  MG/20ML LIQD Take 10 mLs by mouth every 4 hours as needed (cough).      diclofenac (VOLTAREN) 1 % topical gel Apply 2 g topically every 6 hours as needed for moderate pain (knees).      DULoxetine (CYMBALTA) 60 MG capsule Take 120 mg by mouth daily.      folic acid (FOLVITE) 1 MG tablet Take 1 mg by mouth daily.      gabapentin (NEURONTIN) 100 MG capsule Take 300 mg by mouth 3 times daily.      lamoTRIgine (LAMICTAL) 25 MG tablet Take 50 mg by mouth daily.      lisinopril (PRINIVIL,ZESTRIL) 5 MG tablet Take 1 tablet by mouth 2 times daily.  Qty: 90 tablet, Refills: 5    Associated Diagnoses: Unspecified essential hypertension      loperamide (IMODIUM) 1 MG/7.5ML liquid Take 2 mg by mouth 4 times daily as needed for diarrhea.      metFORMIN (GLUCOPHAGE XR) 500 MG 24 hr tablet Take 1,000 mg by mouth daily (with breakfast).       methocarbamol (ROBAXIN) 500 MG tablet Take 500 mg by mouth 3 times daily as needed for muscle spasms.      metoprolol succinate ER (TOPROL XL) 100 MG 24 hr tablet Take 100 mg by mouth 2 times daily.      !! NONFORMULARY PROSTATE X PLUS -  take 1 capsule every 24 hr as needed for prostate supplement.      !! NONFORMULARY Herbal Virility Max - take 2 tablets every 24 hr as needed for dietary supplement.      polyethylene glycol (MIRALAX) 17 GM/Dose powder Take 1 Capful by mouth daily as needed for constipation. Dissolved in 4-8 oz of beverage/drink      QUEtiapine (SEROQUEL) 200 MG tablet Take 400 mg by mouth at bedtime.      thiamine (B-1) 100 MG tablet Take 100 mg by mouth daily.      topiramate (TOPAMAX) 50 MG tablet Take 100 mg by mouth 2 times daily.      vitamin B-12 (CYANOCOBALAMIN) 1000 MCG tablet Take 1,000 mcg by mouth daily.       !! - Potential duplicate medications found. Please discuss with provider.        Allergies   Allergies   Allergen Reactions    No Known Drug Allergy

## 2025-01-28 NOTE — PROGRESS NOTES
Shift: 0700 - 1350  Patient A&O x4, vss, on RA  BP (!) 158/97 (BP Location: Right arm, Cuff Size: Adult Regular)   Pulse 67   Temp 97.5  F (36.4  C) (Oral)   Resp 16   SpO2 100%   BG 82, patient assisted with ordering breakfast.   Voiding per urinal. No BM this shift.   No c/o of pain this shift.   Assisted with getting ready to discharge back to prior LTC, SBA to w/c.

## 2025-01-28 NOTE — PROGRESS NOTES
Pt A x O 4, denies pain this shift. Pt soiled bed, bed linen changed and pt changed into new gown. Resting in bed

## 2025-01-28 NOTE — PROGRESS NOTES
Discharge instruction with packet sent with patient for LTC Facility. No PIV, had been  removed prior. Patient assisted to w/c, staff to patient to the main lobby. [facility] awaiting at main lobby by 2pm. Patient discharged with discharge packet and all belongings.

## 2025-01-28 NOTE — PROGRESS NOTES
Care Management Discharge Note    Discharge Date: 01/23/2025       Discharge Disposition:  [facility]    Discharge Services:  None    Discharge DME:  None    Discharge Transportation:  Wheelchair ride     Private pay costs discussed: Not applicable    Does the patient's insurance plan have a 3 day qualifying hospital stay waiver?  No    PAS Confirmation Code:  Not applicable   Patient/family educated on Medicare website which has current facility and service quality ratings: No    Education Provided on the Discharge Plan:  Yes  Persons Notified of Discharge Plans: Pt, pt's bedside nurse, charge nurse, and provider   Patient/Family in Agreement with the Plan:  Yes    Handoff Referral Completed: Yes, non-[facility] PCP: External handoff communication completed    Additional Information:  SAGAR received a message from ED SW that Emeli at [facility] called and left VM and stated she can take pt back. SAGAR called Emeli to discuss pt and Emeli reported  that there is a change in plan and they can now take pt back. SAGAR asked for confirmation who agreed for pt to return so she can ensure this is the case and they won't change in facility taking pt back. Emeli shared the  Gerber said pt can return and she is the admission coordinator. Emeli shared discharge orders can be sent via Epic and they need orders two hours prior to discharge. SAGAR met with pt at bedside and informed that facility can take him back and pt is happy to hear he can return to facility. SAGAR updated pt's bedside nurse, charge nurse, and provider. SAGAR message supervisor regarding transportation payment for wheelchair ride since pt doesn't have the money and family to pay for ride. Supervisor asked SAGAR for the quote  of the cost for a wheelchair ride. SAGAR called [employer] and they don't have any availability until after midnight.   SAGAR called [employer] and they stated they cannot bring a wheelchair and pt will have to  have their own wheelchair or they can take hospital's wheelchair and bring it back. SAGAR met with charge nurse and he is agreeable to pt discharging with our wheelchair and transportation bringing it back. SAGAR called [employer]( [phone]) and they quoted $105 for a wheelchair ride with 2:00pm . SAGAR messaged supervisor( Danielle)  and she approved of amount and asked SW follow up with Norma to pay for a the wheelchair ride. SAGAR called Norma and she had questions about process for SAGAR. SAGAR messaged supervisor and was told to fill out the major funds form. SAGAR called Sharonda back and arranged  for 2:00pm and they stated that payment has to be made now if it is a credit card. SAGAR asked them to hold and messaged Norma who requested for them to call her. SAGAR provided Sharonda with Norma's number and Norma shared she paid for the ride. SAGAR sent message to [facility] via [employer] on discharge time and transportation agency. SAGAR met with pt at bedside and updated him of discharge time and updated the bedside nurse.     LOUISE Atkins, CHRISS  OBS/ED   Phone: [phone]  Fax: [phone]  Vocera: 1A Obs SAGAR

## 2025-01-30 ENCOUNTER — DOCUMENTATION ONLY (OUTPATIENT)
Dept: OTHER | Facility: CLINIC | Age: 70
End: 2025-01-30
Payer: COMMERCIAL

## 2025-07-08 ENCOUNTER — LAB REQUISITION (OUTPATIENT)
Dept: LAB | Facility: CLINIC | Age: 70
End: 2025-07-08
Payer: COMMERCIAL

## 2025-07-08 DIAGNOSIS — E11.9 TYPE 2 DIABETES MELLITUS WITHOUT COMPLICATIONS (H): ICD-10-CM

## 2025-07-13 ENCOUNTER — LAB REQUISITION (OUTPATIENT)
Dept: LAB | Facility: CLINIC | Age: 70
End: 2025-07-13
Payer: COMMERCIAL

## 2025-07-13 DIAGNOSIS — E78.49 OTHER HYPERLIPIDEMIA: ICD-10-CM

## 2025-07-15 LAB
ALBUMIN SERPL BCG-MCNC: 4 G/DL (ref 3.5–5.2)
ALP SERPL-CCNC: 135 U/L (ref 40–150)
ALT SERPL W P-5'-P-CCNC: 38 U/L (ref 0–70)
ANION GAP SERPL CALCULATED.3IONS-SCNC: 12 MMOL/L (ref 7–15)
AST SERPL W P-5'-P-CCNC: 32 U/L (ref 0–45)
BILIRUB SERPL-MCNC: 0.4 MG/DL
BUN SERPL-MCNC: 26.8 MG/DL (ref 8–23)
CALCIUM SERPL-MCNC: 9.9 MG/DL (ref 8.8–10.4)
CHLORIDE SERPL-SCNC: 108 MMOL/L (ref 98–107)
CHOLEST SERPL-MCNC: 111 MG/DL
CREAT SERPL-MCNC: 1.23 MG/DL (ref 0.67–1.17)
EGFRCR SERPLBLD CKD-EPI 2021: 63 ML/MIN/1.73M2
ERYTHROCYTE [DISTWIDTH] IN BLOOD BY AUTOMATED COUNT: 13.4 % (ref 10–15)
EST. AVERAGE GLUCOSE BLD GHB EST-MCNC: 131 MG/DL
FASTING STATUS PATIENT QL REPORTED: NORMAL
GLUCOSE SERPL-MCNC: 110 MG/DL (ref 70–99)
HBA1C MFR BLD: 6.2 %
HCO3 SERPL-SCNC: 21 MMOL/L (ref 22–29)
HCT VFR BLD AUTO: 43.8 % (ref 40–53)
HDLC SERPL-MCNC: 58 MG/DL
HGB BLD-MCNC: 14.1 G/DL (ref 13.3–17.7)
LDLC SERPL CALC-MCNC: 38 MG/DL
MCH RBC QN AUTO: 30.1 PG (ref 26.5–33)
MCHC RBC AUTO-ENTMCNC: 32.2 G/DL (ref 31.5–36.5)
MCV RBC AUTO: 93 FL (ref 78–100)
NONHDLC SERPL-MCNC: 53 MG/DL
PLATELET # BLD AUTO: 192 10E3/UL (ref 150–450)
POTASSIUM SERPL-SCNC: 4.4 MMOL/L (ref 3.4–5.3)
PROT SERPL-MCNC: 7 G/DL (ref 6.4–8.3)
RBC # BLD AUTO: 4.69 10E6/UL (ref 4.4–5.9)
SODIUM SERPL-SCNC: 141 MMOL/L (ref 135–145)
TRIGL SERPL-MCNC: 75 MG/DL
WBC # BLD AUTO: 10.5 10E3/UL (ref 4–11)